# Patient Record
Sex: FEMALE | Race: WHITE | Employment: FULL TIME | URBAN - NONMETROPOLITAN AREA
[De-identification: names, ages, dates, MRNs, and addresses within clinical notes are randomized per-mention and may not be internally consistent; named-entity substitution may affect disease eponyms.]

---

## 2017-01-03 ENCOUNTER — OFFICE VISIT (OUTPATIENT)
Dept: FAMILY MEDICINE CLINIC | Age: 57
End: 2017-01-03

## 2017-01-03 VITALS
TEMPERATURE: 99.1 F | OXYGEN SATURATION: 98 % | WEIGHT: 201.8 LBS | HEIGHT: 64 IN | DIASTOLIC BLOOD PRESSURE: 80 MMHG | HEART RATE: 88 BPM | SYSTOLIC BLOOD PRESSURE: 110 MMHG | BODY MASS INDEX: 34.45 KG/M2

## 2017-01-03 DIAGNOSIS — J01.41 ACUTE RECURRENT PANSINUSITIS: Primary | ICD-10-CM

## 2017-01-03 PROCEDURE — 99213 OFFICE O/P EST LOW 20 MIN: CPT | Performed by: PHYSICIAN ASSISTANT

## 2017-01-03 RX ORDER — LACTOBACILLUS RHAMNOSUS GG 10B CELL
1 CAPSULE ORAL DAILY
Qty: 30 CAPSULE | Refills: 6 | Status: SHIPPED | OUTPATIENT
Start: 2017-01-03 | End: 2017-05-24 | Stop reason: ALTCHOICE

## 2017-01-03 RX ORDER — SULFAMETHOXAZOLE AND TRIMETHOPRIM 800; 160 MG/1; MG/1
1 TABLET ORAL 2 TIMES DAILY
Qty: 28 TABLET | Refills: 0 | Status: SHIPPED | OUTPATIENT
Start: 2017-01-03 | End: 2017-01-17

## 2017-01-03 RX ORDER — PREDNISONE 20 MG/1
20 TABLET ORAL 2 TIMES DAILY
Qty: 10 TABLET | Refills: 0 | Status: SHIPPED | OUTPATIENT
Start: 2017-01-03 | End: 2017-01-08

## 2017-01-03 ASSESSMENT — ENCOUNTER SYMPTOMS
SHORTNESS OF BREATH: 0
SINUS PRESSURE: 1
SORE THROAT: 0
HOARSE VOICE: 1
SINUS COMPLAINT: 1
COUGH: 0

## 2017-01-04 ENCOUNTER — TELEPHONE (OUTPATIENT)
Dept: FAMILY MEDICINE CLINIC | Age: 57
End: 2017-01-04

## 2017-01-09 ENCOUNTER — TELEPHONE (OUTPATIENT)
Dept: FAMILY MEDICINE CLINIC | Age: 57
End: 2017-01-09

## 2017-01-09 DIAGNOSIS — R05.9 COUGH: Primary | ICD-10-CM

## 2017-01-09 RX ORDER — DEXTROMETHORPHAN HYDROBROMIDE AND PROMETHAZINE HYDROCHLORIDE 15; 6.25 MG/5ML; MG/5ML
5 SYRUP ORAL 4 TIMES DAILY PRN
Qty: 150 ML | Refills: 0 | Status: SHIPPED | OUTPATIENT
Start: 2017-01-09 | End: 2017-01-16

## 2017-01-16 ENCOUNTER — TELEPHONE (OUTPATIENT)
Dept: FAMILY MEDICINE CLINIC | Age: 57
End: 2017-01-16

## 2017-03-31 ENCOUNTER — OFFICE VISIT (OUTPATIENT)
Dept: FAMILY MEDICINE CLINIC | Age: 57
End: 2017-03-31
Payer: COMMERCIAL

## 2017-03-31 VITALS
WEIGHT: 187 LBS | DIASTOLIC BLOOD PRESSURE: 78 MMHG | SYSTOLIC BLOOD PRESSURE: 108 MMHG | TEMPERATURE: 98 F | BODY MASS INDEX: 31.92 KG/M2 | HEIGHT: 64 IN | OXYGEN SATURATION: 98 % | HEART RATE: 84 BPM

## 2017-03-31 DIAGNOSIS — R53.83 FATIGUE, UNSPECIFIED TYPE: ICD-10-CM

## 2017-03-31 DIAGNOSIS — B07.0 PLANTAR WART: ICD-10-CM

## 2017-03-31 DIAGNOSIS — J01.41 ACUTE RECURRENT PANSINUSITIS: Primary | ICD-10-CM

## 2017-03-31 DIAGNOSIS — R40.0 DAYTIME SOMNOLENCE: ICD-10-CM

## 2017-03-31 PROCEDURE — 99214 OFFICE O/P EST MOD 30 MIN: CPT | Performed by: PHYSICIAN ASSISTANT

## 2017-03-31 PROCEDURE — 17110 DESTRUCTION B9 LES UP TO 14: CPT | Performed by: PHYSICIAN ASSISTANT

## 2017-03-31 RX ORDER — CHLORAL HYDRATE 500 MG
1000 CAPSULE ORAL DAILY
COMMUNITY
End: 2017-05-24 | Stop reason: ALTCHOICE

## 2017-03-31 RX ORDER — PREDNISONE 20 MG/1
20 TABLET ORAL 2 TIMES DAILY
Qty: 10 TABLET | Refills: 0 | Status: SHIPPED | OUTPATIENT
Start: 2017-03-31 | End: 2017-04-05

## 2017-03-31 RX ORDER — ASCORBIC ACID 500 MG
500 TABLET ORAL 2 TIMES DAILY
COMMUNITY
End: 2019-01-31

## 2017-03-31 RX ORDER — LANOLIN ALCOHOL/MO/W.PET/CERES
50 CREAM (GRAM) TOPICAL DAILY
COMMUNITY
End: 2019-01-31

## 2017-03-31 RX ORDER — MULTIVITAMIN WITH IRON
250 TABLET ORAL DAILY
COMMUNITY
End: 2017-05-24 | Stop reason: ALTCHOICE

## 2017-03-31 RX ORDER — SULFAMETHOXAZOLE AND TRIMETHOPRIM 800; 160 MG/1; MG/1
1 TABLET ORAL 2 TIMES DAILY
Qty: 28 TABLET | Refills: 0 | Status: SHIPPED | OUTPATIENT
Start: 2017-03-31 | End: 2017-04-14

## 2017-03-31 RX ORDER — FLUCONAZOLE 150 MG/1
TABLET ORAL
Qty: 2 TABLET | Refills: 1 | Status: SHIPPED | OUTPATIENT
Start: 2017-03-31 | End: 2017-05-24 | Stop reason: ALTCHOICE

## 2017-03-31 ASSESSMENT — ENCOUNTER SYMPTOMS
SWOLLEN GLANDS: 1
SORE THROAT: 0
SINUS COMPLAINT: 1
SINUS PRESSURE: 1
COUGH: 1

## 2017-04-02 ASSESSMENT — ENCOUNTER SYMPTOMS: GASTROINTESTINAL NEGATIVE: 1

## 2017-04-03 DIAGNOSIS — R40.0 DAYTIME SOMNOLENCE: Primary | ICD-10-CM

## 2017-04-27 ENCOUNTER — OFFICE VISIT (OUTPATIENT)
Dept: PRIMARY CARE CLINIC | Age: 57
End: 2017-04-27
Payer: COMMERCIAL

## 2017-04-27 VITALS
HEIGHT: 64 IN | TEMPERATURE: 98.2 F | BODY MASS INDEX: 31.1 KG/M2 | WEIGHT: 182.2 LBS | DIASTOLIC BLOOD PRESSURE: 74 MMHG | OXYGEN SATURATION: 98 % | SYSTOLIC BLOOD PRESSURE: 126 MMHG

## 2017-04-27 DIAGNOSIS — J01.41 ACUTE RECURRENT PANSINUSITIS: Primary | ICD-10-CM

## 2017-04-27 DIAGNOSIS — R50.81 FEVER IN OTHER DISEASES: ICD-10-CM

## 2017-04-27 LAB
INFLUENZA A ANTIBODY: NORMAL
INFLUENZA B ANTIBODY: NORMAL

## 2017-04-27 PROCEDURE — 87804 INFLUENZA ASSAY W/OPTIC: CPT | Performed by: PHYSICIAN ASSISTANT

## 2017-04-27 PROCEDURE — 99213 OFFICE O/P EST LOW 20 MIN: CPT | Performed by: PHYSICIAN ASSISTANT

## 2017-04-27 RX ORDER — FLUTICASONE PROPIONATE 50 MCG
2 SPRAY, SUSPENSION (ML) NASAL DAILY
Qty: 1 BOTTLE | Refills: 3 | Status: SHIPPED | OUTPATIENT
Start: 2017-04-27 | End: 2017-05-24 | Stop reason: ALTCHOICE

## 2017-04-27 RX ORDER — AMOXICILLIN AND CLAVULANATE POTASSIUM 875; 125 MG/1; MG/1
1 TABLET, FILM COATED ORAL 2 TIMES DAILY
Qty: 20 TABLET | Refills: 0 | Status: SHIPPED | OUTPATIENT
Start: 2017-04-27 | End: 2017-05-07

## 2017-04-27 ASSESSMENT — ENCOUNTER SYMPTOMS
SORE THROAT: 1
WHEEZING: 0
COUGH: 1
SHORTNESS OF BREATH: 0

## 2017-05-01 ENCOUNTER — TELEPHONE (OUTPATIENT)
Dept: FAMILY MEDICINE CLINIC | Age: 57
End: 2017-05-01

## 2017-05-03 ENCOUNTER — TELEPHONE (OUTPATIENT)
Dept: FAMILY MEDICINE CLINIC | Age: 57
End: 2017-05-03

## 2017-05-03 RX ORDER — FLUCONAZOLE 150 MG/1
TABLET ORAL
Qty: 2 TABLET | Refills: 1 | Status: SHIPPED | OUTPATIENT
Start: 2017-05-03 | End: 2017-05-24 | Stop reason: ALTCHOICE

## 2017-05-24 ENCOUNTER — OFFICE VISIT (OUTPATIENT)
Dept: FAMILY MEDICINE CLINIC | Age: 57
End: 2017-05-24
Payer: COMMERCIAL

## 2017-05-24 VITALS
TEMPERATURE: 98.4 F | WEIGHT: 181 LBS | HEART RATE: 91 BPM | OXYGEN SATURATION: 97 % | DIASTOLIC BLOOD PRESSURE: 62 MMHG | RESPIRATION RATE: 12 BRPM | HEIGHT: 64 IN | SYSTOLIC BLOOD PRESSURE: 106 MMHG | BODY MASS INDEX: 30.9 KG/M2

## 2017-05-24 DIAGNOSIS — M21.41 FLAT FEET, BILATERAL: ICD-10-CM

## 2017-05-24 DIAGNOSIS — B07.0 PLANTAR WART: Primary | ICD-10-CM

## 2017-05-24 DIAGNOSIS — M21.42 FLAT FEET, BILATERAL: ICD-10-CM

## 2017-05-24 PROCEDURE — 99213 OFFICE O/P EST LOW 20 MIN: CPT | Performed by: NURSE PRACTITIONER

## 2017-05-24 RX ORDER — FORMALDEHYDE 100 MG/ML
1 SOLUTION TOPICAL DAILY PRN
Qty: 120 ML | Refills: 0 | Status: SHIPPED | OUTPATIENT
Start: 2017-05-24 | End: 2019-06-03 | Stop reason: ALTCHOICE

## 2017-05-24 ASSESSMENT — ENCOUNTER SYMPTOMS
CONSTIPATION: 0
VOMITING: 0
TROUBLE SWALLOWING: 0
ALLERGIC/IMMUNOLOGIC NEGATIVE: 1
RESPIRATORY NEGATIVE: 1
SHORTNESS OF BREATH: 0
NAUSEA: 0
DIARRHEA: 0
COUGH: 0
SINUS PRESSURE: 0
ABDOMINAL PAIN: 0
CHEST TIGHTNESS: 0
GASTROINTESTINAL NEGATIVE: 1
EYES NEGATIVE: 1

## 2017-05-26 ENCOUNTER — TELEPHONE (OUTPATIENT)
Dept: PODIATRY | Age: 57
End: 2017-05-26

## 2017-05-26 ENCOUNTER — OFFICE VISIT (OUTPATIENT)
Dept: PODIATRY | Age: 57
End: 2017-05-26
Payer: COMMERCIAL

## 2017-05-26 VITALS
DIASTOLIC BLOOD PRESSURE: 74 MMHG | BODY MASS INDEX: 30.83 KG/M2 | WEIGHT: 180.6 LBS | HEIGHT: 64 IN | SYSTOLIC BLOOD PRESSURE: 115 MMHG | HEART RATE: 78 BPM

## 2017-05-26 DIAGNOSIS — M79.672 LEFT FOOT PAIN: ICD-10-CM

## 2017-05-26 DIAGNOSIS — M21.6X1 PRONATION DEFORMITY OF BOTH FEET: Primary | ICD-10-CM

## 2017-05-26 DIAGNOSIS — M21.6X2 PRONATION DEFORMITY OF BOTH FEET: Primary | ICD-10-CM

## 2017-05-26 DIAGNOSIS — L84 CORNS AND CALLOSITIES: ICD-10-CM

## 2017-05-26 PROCEDURE — L3040 FT ARCH SUPRT PREMOLD LONGIT: HCPCS | Performed by: PODIATRIST

## 2017-05-26 PROCEDURE — 99213 OFFICE O/P EST LOW 20 MIN: CPT | Performed by: PODIATRIST

## 2017-05-26 RX ORDER — UREA 40 G/100G
1 LOTION TOPICAL DAILY
Qty: 1 BOTTLE | Refills: 1 | Status: SHIPPED | OUTPATIENT
Start: 2017-05-26 | End: 2019-01-31

## 2017-07-14 ENCOUNTER — TELEPHONE (OUTPATIENT)
Dept: FAMILY MEDICINE CLINIC | Age: 57
End: 2017-07-14

## 2017-07-30 ENCOUNTER — OFFICE VISIT (OUTPATIENT)
Dept: PRIMARY CARE CLINIC | Age: 57
End: 2017-07-30
Payer: COMMERCIAL

## 2017-07-30 VITALS
BODY MASS INDEX: 31.58 KG/M2 | SYSTOLIC BLOOD PRESSURE: 114 MMHG | HEART RATE: 110 BPM | OXYGEN SATURATION: 96 % | DIASTOLIC BLOOD PRESSURE: 70 MMHG | WEIGHT: 185 LBS | RESPIRATION RATE: 14 BRPM | HEIGHT: 64 IN | TEMPERATURE: 98.1 F

## 2017-07-30 DIAGNOSIS — R35.0 FREQUENCY OF URINATION: Primary | ICD-10-CM

## 2017-07-30 DIAGNOSIS — H60.392 OTHER INFECTIVE OTITIS EXTERNA OF LEFT EAR, UNSPECIFIED CHRONICITY: ICD-10-CM

## 2017-07-30 LAB
-: ABNORMAL
AMORPHOUS: ABNORMAL
BACTERIA: ABNORMAL
BILIRUBIN URINE: NEGATIVE
CASTS UA: ABNORMAL /LPF (ref 0–2)
COLOR: ABNORMAL
COMMENT UA: ABNORMAL
CRYSTALS, UA: ABNORMAL /HPF
EPITHELIAL CELLS UA: ABNORMAL /HPF (ref 0–5)
GLUCOSE URINE: NEGATIVE
KETONES, URINE: NEGATIVE
LEUKOCYTE ESTERASE, URINE: NEGATIVE
MUCUS: ABNORMAL
NITRITE, URINE: NEGATIVE
OTHER OBSERVATIONS UA: ABNORMAL
PH UA: 5.5 (ref 5–6)
PROTEIN UA: NEGATIVE
RBC UA: ABNORMAL /HPF (ref 0–4)
RENAL EPITHELIAL, UA: ABNORMAL /HPF
SPECIFIC GRAVITY UA: 1.02 (ref 1.01–1.02)
TRICHOMONAS: ABNORMAL
TURBIDITY: ABNORMAL
URINE HGB: ABNORMAL
UROBILINOGEN, URINE: NORMAL
WBC UA: ABNORMAL /HPF (ref 0–4)
YEAST: ABNORMAL

## 2017-07-30 PROCEDURE — 81003 URINALYSIS AUTO W/O SCOPE: CPT | Performed by: FAMILY MEDICINE

## 2017-07-30 PROCEDURE — 99213 OFFICE O/P EST LOW 20 MIN: CPT | Performed by: FAMILY MEDICINE

## 2017-07-30 PROCEDURE — 81001 URINALYSIS AUTO W/SCOPE: CPT | Performed by: FAMILY MEDICINE

## 2017-07-30 ASSESSMENT — ENCOUNTER SYMPTOMS
SINUS PRESSURE: 0
EYES NEGATIVE: 1
GASTROINTESTINAL NEGATIVE: 1
RHINORRHEA: 0
RESPIRATORY NEGATIVE: 1
ALLERGIC/IMMUNOLOGIC NEGATIVE: 1

## 2017-08-08 DIAGNOSIS — K21.9 GASTROESOPHAGEAL REFLUX DISEASE WITHOUT ESOPHAGITIS: ICD-10-CM

## 2017-08-09 RX ORDER — PANTOPRAZOLE SODIUM 40 MG/1
TABLET, DELAYED RELEASE ORAL
Qty: 90 TABLET | Refills: 0 | Status: SHIPPED | OUTPATIENT
Start: 2017-08-09 | End: 2017-09-25 | Stop reason: SDUPTHER

## 2017-09-23 ENCOUNTER — HOSPITAL ENCOUNTER (OUTPATIENT)
Dept: LAB | Age: 57
Setting detail: SPECIMEN
Discharge: HOME | End: 2017-09-23
Payer: COMMERCIAL

## 2017-09-23 DIAGNOSIS — I63.9 CEREBROVASCULAR ACCIDENT (CVA), UNSPECIFIED MECHANISM (HCC): ICD-10-CM

## 2017-09-23 DIAGNOSIS — Z11.59 NEED FOR HEPATITIS C SCREENING TEST: ICD-10-CM

## 2017-09-23 DIAGNOSIS — Z11.4 SCREENING FOR HIV (HUMAN IMMUNODEFICIENCY VIRUS): ICD-10-CM

## 2017-09-23 LAB
ALBUMIN SERPL-MCNC: 4.2 G/DL (ref 3.5–5.2)
ALBUMIN/GLOBULIN RATIO: 1.8 (ref 1–2.5)
ALP BLD-CCNC: 53 U/L (ref 35–104)
ALT SERPL-CCNC: 13 U/L (ref 5–33)
ANION GAP SERPL CALCULATED.3IONS-SCNC: 11 MMOL/L (ref 9–17)
AST SERPL-CCNC: 13 U/L
BILIRUB SERPL-MCNC: 0.39 MG/DL (ref 0.3–1.2)
BUN BLDV-MCNC: 18 MG/DL (ref 6–20)
BUN/CREAT BLD: 31 (ref 9–20)
CALCIUM SERPL-MCNC: 9.2 MG/DL (ref 8.6–10.4)
CHLORIDE BLD-SCNC: 106 MMOL/L (ref 98–107)
CHOLESTEROL/HDL RATIO: 3
CHOLESTEROL: 167 MG/DL
CO2: 25 MMOL/L (ref 20–31)
CREAT SERPL-MCNC: 0.59 MG/DL (ref 0.5–0.9)
GFR AFRICAN AMERICAN: >60 ML/MIN
GFR NON-AFRICAN AMERICAN: >60 ML/MIN
GFR SERPL CREATININE-BSD FRML MDRD: ABNORMAL ML/MIN/{1.73_M2}
GFR SERPL CREATININE-BSD FRML MDRD: ABNORMAL ML/MIN/{1.73_M2}
GLUCOSE BLD-MCNC: 90 MG/DL (ref 70–99)
HDLC SERPL-MCNC: 56 MG/DL
HEPATITIS C ANTIBODY: NONREACTIVE
HIV AG/AB: NONREACTIVE
LDL CHOLESTEROL: 95 MG/DL (ref 0–130)
POTASSIUM SERPL-SCNC: 4.1 MMOL/L (ref 3.7–5.3)
SODIUM BLD-SCNC: 142 MMOL/L (ref 135–144)
TOTAL PROTEIN: 6.5 G/DL (ref 6.4–8.3)
TRIGL SERPL-MCNC: 81 MG/DL
VLDLC SERPL CALC-MCNC: NORMAL MG/DL (ref 1–30)

## 2017-09-23 PROCEDURE — 87389 HIV-1 AG W/HIV-1&-2 AB AG IA: CPT

## 2017-09-23 PROCEDURE — 80061 LIPID PANEL: CPT

## 2017-09-23 PROCEDURE — 36415 COLL VENOUS BLD VENIPUNCTURE: CPT

## 2017-09-23 PROCEDURE — 86803 HEPATITIS C AB TEST: CPT

## 2017-09-23 PROCEDURE — 80053 COMPREHEN METABOLIC PANEL: CPT

## 2017-09-25 ENCOUNTER — OFFICE VISIT (OUTPATIENT)
Dept: FAMILY MEDICINE CLINIC | Age: 57
End: 2017-09-25
Payer: COMMERCIAL

## 2017-09-25 ENCOUNTER — TELEPHONE (OUTPATIENT)
Dept: FAMILY MEDICINE CLINIC | Age: 57
End: 2017-09-25

## 2017-09-25 VITALS
DIASTOLIC BLOOD PRESSURE: 68 MMHG | WEIGHT: 181 LBS | BODY MASS INDEX: 32.07 KG/M2 | SYSTOLIC BLOOD PRESSURE: 110 MMHG | HEART RATE: 88 BPM | HEIGHT: 63 IN

## 2017-09-25 DIAGNOSIS — Z72.0 TOBACCO ABUSE: ICD-10-CM

## 2017-09-25 DIAGNOSIS — F41.9 ANXIETY: ICD-10-CM

## 2017-09-25 DIAGNOSIS — I63.9 CEREBROVASCULAR ACCIDENT (CVA), UNSPECIFIED MECHANISM (HCC): ICD-10-CM

## 2017-09-25 DIAGNOSIS — K21.9 GASTROESOPHAGEAL REFLUX DISEASE WITHOUT ESOPHAGITIS: Primary | ICD-10-CM

## 2017-09-25 PROCEDURE — 99214 OFFICE O/P EST MOD 30 MIN: CPT | Performed by: PHYSICIAN ASSISTANT

## 2017-09-25 RX ORDER — CLOPIDOGREL BISULFATE 75 MG/1
75 TABLET ORAL DAILY
Qty: 90 TABLET | Refills: 1 | Status: SHIPPED | OUTPATIENT
Start: 2017-09-25 | End: 2018-06-08 | Stop reason: SDUPTHER

## 2017-09-25 RX ORDER — BUPROPION HYDROCHLORIDE 100 MG/1
100 TABLET, EXTENDED RELEASE ORAL 2 TIMES DAILY
Qty: 180 TABLET | Refills: 1 | Status: SHIPPED | OUTPATIENT
Start: 2017-09-25 | End: 2019-01-11

## 2017-09-25 RX ORDER — VARENICLINE TARTRATE 1 MG/1
1 TABLET, FILM COATED ORAL 2 TIMES DAILY
Qty: 180 TABLET | Refills: 0 | Status: SHIPPED | OUTPATIENT
Start: 2017-09-25 | End: 2018-07-31

## 2017-09-25 RX ORDER — PANTOPRAZOLE SODIUM 40 MG/1
TABLET, DELAYED RELEASE ORAL
Qty: 90 TABLET | Refills: 1 | Status: SHIPPED | OUTPATIENT
Start: 2017-09-25 | End: 2018-06-08 | Stop reason: SDUPTHER

## 2017-09-25 RX ORDER — VARENICLINE TARTRATE 25 MG
KIT ORAL
Qty: 1 EACH | Refills: 0 | Status: SHIPPED | OUTPATIENT
Start: 2017-09-25 | End: 2018-07-31

## 2017-09-30 ASSESSMENT — ENCOUNTER SYMPTOMS
ABDOMINAL PAIN: 0
COUGH: 0
HEARTBURN: 1
CHOKING: 0

## 2017-12-01 DIAGNOSIS — Z72.0 TOBACCO ABUSE: ICD-10-CM

## 2017-12-04 RX ORDER — VARENICLINE TARTRATE 1 MG/1
TABLET, FILM COATED ORAL
Qty: 168 TABLET | Refills: 0 | OUTPATIENT
Start: 2017-12-04

## 2018-01-31 ENCOUNTER — TELEPHONE (OUTPATIENT)
Dept: MAMMOGRAPHY | Age: 58
End: 2018-01-31

## 2018-01-31 DIAGNOSIS — Z12.31 SCREENING MAMMOGRAM, ENCOUNTER FOR: Primary | ICD-10-CM

## 2018-02-03 ENCOUNTER — HOSPITAL ENCOUNTER (OUTPATIENT)
Dept: MAMMOGRAPHY | Age: 58
Discharge: HOME | End: 2018-02-05
Payer: COMMERCIAL

## 2018-02-03 DIAGNOSIS — Z12.31 SCREENING MAMMOGRAM, ENCOUNTER FOR: ICD-10-CM

## 2018-02-03 PROCEDURE — 77067 SCR MAMMO BI INCL CAD: CPT

## 2018-04-24 ENCOUNTER — OFFICE VISIT (OUTPATIENT)
Dept: PRIMARY CARE CLINIC | Age: 58
End: 2018-04-24
Payer: COMMERCIAL

## 2018-04-24 VITALS
DIASTOLIC BLOOD PRESSURE: 64 MMHG | HEART RATE: 74 BPM | BODY MASS INDEX: 32.44 KG/M2 | OXYGEN SATURATION: 98 % | WEIGHT: 190 LBS | TEMPERATURE: 98.2 F | HEIGHT: 64 IN | SYSTOLIC BLOOD PRESSURE: 120 MMHG

## 2018-04-24 DIAGNOSIS — H66.002 ACUTE SUPPURATIVE OTITIS MEDIA OF LEFT EAR WITHOUT SPONTANEOUS RUPTURE OF TYMPANIC MEMBRANE, RECURRENCE NOT SPECIFIED: Primary | ICD-10-CM

## 2018-04-24 DIAGNOSIS — H60.331 ACUTE SWIMMER'S EAR OF RIGHT SIDE: ICD-10-CM

## 2018-04-24 PROCEDURE — 99214 OFFICE O/P EST MOD 30 MIN: CPT | Performed by: FAMILY MEDICINE

## 2018-04-24 RX ORDER — AMOXICILLIN AND CLAVULANATE POTASSIUM 500; 125 MG/1; MG/1
1 TABLET, FILM COATED ORAL 2 TIMES DAILY
Qty: 20 TABLET | Refills: 0 | Status: SHIPPED | OUTPATIENT
Start: 2018-04-24 | End: 2018-05-04

## 2018-04-24 ASSESSMENT — ENCOUNTER SYMPTOMS
RHINORRHEA: 1
SINUS PRESSURE: 0
SINUS PAIN: 0
GASTROINTESTINAL NEGATIVE: 1
EYES NEGATIVE: 1
SORE THROAT: 0
RESPIRATORY NEGATIVE: 1

## 2018-05-03 ENCOUNTER — TELEPHONE (OUTPATIENT)
Dept: FAMILY MEDICINE CLINIC | Age: 58
End: 2018-05-03

## 2018-05-03 RX ORDER — FLUCONAZOLE 150 MG/1
TABLET ORAL
Qty: 2 TABLET | Refills: 0 | Status: SHIPPED | OUTPATIENT
Start: 2018-05-03 | End: 2018-07-31

## 2018-06-06 DIAGNOSIS — K21.9 GASTROESOPHAGEAL REFLUX DISEASE WITHOUT ESOPHAGITIS: ICD-10-CM

## 2018-06-06 DIAGNOSIS — I63.9 CEREBROVASCULAR ACCIDENT (CVA), UNSPECIFIED MECHANISM (HCC): ICD-10-CM

## 2018-06-06 RX ORDER — PANTOPRAZOLE SODIUM 40 MG/1
TABLET, DELAYED RELEASE ORAL
Qty: 90 TABLET | Refills: 1 | OUTPATIENT
Start: 2018-06-06

## 2018-06-06 RX ORDER — CLOPIDOGREL BISULFATE 75 MG/1
TABLET ORAL
Qty: 90 TABLET | Refills: 1 | OUTPATIENT
Start: 2018-06-06

## 2018-06-08 DIAGNOSIS — K21.9 GASTROESOPHAGEAL REFLUX DISEASE WITHOUT ESOPHAGITIS: ICD-10-CM

## 2018-06-08 DIAGNOSIS — I63.9 CEREBROVASCULAR ACCIDENT (CVA), UNSPECIFIED MECHANISM (HCC): ICD-10-CM

## 2018-06-08 RX ORDER — CLOPIDOGREL BISULFATE 75 MG/1
75 TABLET ORAL DAILY
Qty: 90 TABLET | Refills: 0 | Status: SHIPPED | OUTPATIENT
Start: 2018-06-08 | End: 2018-08-28 | Stop reason: SDUPTHER

## 2018-06-08 RX ORDER — PANTOPRAZOLE SODIUM 40 MG/1
TABLET, DELAYED RELEASE ORAL
Qty: 90 TABLET | Refills: 0 | Status: SHIPPED | OUTPATIENT
Start: 2018-06-08 | End: 2018-08-28 | Stop reason: SDUPTHER

## 2018-07-12 ENCOUNTER — OFFICE VISIT (OUTPATIENT)
Dept: FAMILY MEDICINE CLINIC | Age: 58
End: 2018-07-12
Payer: COMMERCIAL

## 2018-07-12 ENCOUNTER — NURSE ONLY (OUTPATIENT)
Dept: LAB | Age: 58
End: 2018-07-12
Payer: COMMERCIAL

## 2018-07-12 VITALS
HEIGHT: 64 IN | BODY MASS INDEX: 29.64 KG/M2 | SYSTOLIC BLOOD PRESSURE: 118 MMHG | DIASTOLIC BLOOD PRESSURE: 70 MMHG | WEIGHT: 173.6 LBS | HEART RATE: 72 BPM

## 2018-07-12 DIAGNOSIS — Z23 NEED FOR 23-POLYVALENT PNEUMOCOCCAL POLYSACCHARIDE VACCINE: ICD-10-CM

## 2018-07-12 DIAGNOSIS — Z20.5 EXPOSURE TO HEPATITIS A: ICD-10-CM

## 2018-07-12 DIAGNOSIS — I63.9 CEREBROVASCULAR ACCIDENT (CVA), UNSPECIFIED MECHANISM (HCC): ICD-10-CM

## 2018-07-12 DIAGNOSIS — K21.9 GASTROESOPHAGEAL REFLUX DISEASE WITHOUT ESOPHAGITIS: Primary | ICD-10-CM

## 2018-07-12 DIAGNOSIS — J30.1 CHRONIC ALLERGIC RHINITIS DUE TO POLLEN, UNSPECIFIED SEASONALITY: ICD-10-CM

## 2018-07-12 DIAGNOSIS — Z23 NEED FOR SHINGLES VACCINE: ICD-10-CM

## 2018-07-12 PROCEDURE — 99213 OFFICE O/P EST LOW 20 MIN: CPT | Performed by: PHYSICIAN ASSISTANT

## 2018-07-12 PROCEDURE — 90471 IMMUNIZATION ADMIN: CPT | Performed by: PHYSICIAN ASSISTANT

## 2018-07-12 PROCEDURE — 90632 HEPA VACCINE ADULT IM: CPT | Performed by: PHYSICIAN ASSISTANT

## 2018-07-12 RX ORDER — CETIRIZINE HYDROCHLORIDE 10 MG/1
10 TABLET ORAL DAILY
Qty: 30 TABLET | Refills: 6 | Status: SHIPPED | OUTPATIENT
Start: 2018-07-12 | End: 2019-01-11 | Stop reason: SDUPTHER

## 2018-07-12 RX ORDER — PANTOPRAZOLE SODIUM 40 MG/1
TABLET, DELAYED RELEASE ORAL
Qty: 90 TABLET | Refills: 3 | Status: CANCELLED | OUTPATIENT
Start: 2018-07-12

## 2018-07-12 RX ORDER — CLOPIDOGREL BISULFATE 75 MG/1
75 TABLET ORAL DAILY
Qty: 90 TABLET | Refills: 3 | Status: CANCELLED | OUTPATIENT
Start: 2018-07-12

## 2018-07-12 ASSESSMENT — PATIENT HEALTH QUESTIONNAIRE - PHQ9
SUM OF ALL RESPONSES TO PHQ9 QUESTIONS 1 & 2: 0
SUM OF ALL RESPONSES TO PHQ QUESTIONS 1-9: 0
2. FEELING DOWN, DEPRESSED OR HOPELESS: 0
1. LITTLE INTEREST OR PLEASURE IN DOING THINGS: 0

## 2018-07-12 ASSESSMENT — ENCOUNTER SYMPTOMS
HEARTBURN: 0
ABDOMINAL PAIN: 0
RESPIRATORY NEGATIVE: 1
BELCHING: 0

## 2018-07-16 ENCOUNTER — TELEPHONE (OUTPATIENT)
Dept: FAMILY MEDICINE CLINIC | Age: 58
End: 2018-07-16

## 2018-07-31 ENCOUNTER — HOSPITAL ENCOUNTER (OUTPATIENT)
Age: 58
Setting detail: SPECIMEN
Discharge: HOME | End: 2018-07-31
Payer: COMMERCIAL

## 2018-07-31 ENCOUNTER — OFFICE VISIT (OUTPATIENT)
Dept: PRIMARY CARE CLINIC | Age: 58
End: 2018-07-31
Payer: COMMERCIAL

## 2018-07-31 VITALS
BODY MASS INDEX: 30.1 KG/M2 | HEART RATE: 74 BPM | SYSTOLIC BLOOD PRESSURE: 120 MMHG | WEIGHT: 172.6 LBS | TEMPERATURE: 98 F | DIASTOLIC BLOOD PRESSURE: 72 MMHG | OXYGEN SATURATION: 99 %

## 2018-07-31 DIAGNOSIS — R31.9 HEMATURIA, UNSPECIFIED TYPE: ICD-10-CM

## 2018-07-31 DIAGNOSIS — J40 BRONCHITIS: Primary | ICD-10-CM

## 2018-07-31 DIAGNOSIS — Z87.42 HISTORY OF VAGINITIS: ICD-10-CM

## 2018-07-31 LAB
-: NORMAL
AMORPHOUS: NORMAL
BACTERIA: NORMAL
BILIRUBIN URINE: NEGATIVE
CASTS UA: NORMAL /LPF (ref 0–2)
COLOR: ABNORMAL
COMMENT UA: ABNORMAL
CRYSTALS, UA: NORMAL /HPF
EPITHELIAL CELLS UA: NORMAL /HPF (ref 0–5)
GLUCOSE URINE: NEGATIVE
KETONES, URINE: NEGATIVE
LEUKOCYTE ESTERASE, URINE: NEGATIVE
MUCUS: NORMAL
NITRITE, URINE: NEGATIVE
OTHER OBSERVATIONS UA: NORMAL
PH UA: 6 (ref 5–6)
PROTEIN UA: NEGATIVE
RBC UA: NORMAL /HPF (ref 0–4)
RENAL EPITHELIAL, UA: NORMAL /HPF
SPECIFIC GRAVITY UA: 1 (ref 1.01–1.02)
TRICHOMONAS: NORMAL
TURBIDITY: ABNORMAL
URINE HGB: NEGATIVE
UROBILINOGEN, URINE: NORMAL
WBC UA: NORMAL /HPF (ref 0–4)
YEAST: NORMAL

## 2018-07-31 PROCEDURE — 81001 URINALYSIS AUTO W/SCOPE: CPT

## 2018-07-31 PROCEDURE — 99214 OFFICE O/P EST MOD 30 MIN: CPT | Performed by: FAMILY MEDICINE

## 2018-07-31 RX ORDER — FLUCONAZOLE 150 MG/1
TABLET ORAL
Qty: 2 TABLET | Refills: 0 | Status: SHIPPED | OUTPATIENT
Start: 2018-07-31 | End: 2018-08-03

## 2018-07-31 RX ORDER — AZITHROMYCIN 250 MG/1
TABLET, FILM COATED ORAL
Qty: 1 PACKET | Refills: 0 | Status: SHIPPED | OUTPATIENT
Start: 2018-07-31 | End: 2018-08-04

## 2018-07-31 RX ORDER — BENZONATATE 200 MG/1
200 CAPSULE ORAL 3 TIMES DAILY PRN
Qty: 30 CAPSULE | Refills: 0 | Status: SHIPPED | OUTPATIENT
Start: 2018-07-31 | End: 2019-01-31

## 2018-08-28 DIAGNOSIS — K21.9 GASTROESOPHAGEAL REFLUX DISEASE WITHOUT ESOPHAGITIS: ICD-10-CM

## 2018-08-28 DIAGNOSIS — I63.9 CEREBROVASCULAR ACCIDENT (CVA), UNSPECIFIED MECHANISM (HCC): ICD-10-CM

## 2018-08-28 RX ORDER — CLOPIDOGREL BISULFATE 75 MG/1
TABLET ORAL
Qty: 90 TABLET | Refills: 1 | Status: SHIPPED | OUTPATIENT
Start: 2018-08-28 | End: 2019-01-11 | Stop reason: SDUPTHER

## 2018-08-28 RX ORDER — PANTOPRAZOLE SODIUM 40 MG/1
TABLET, DELAYED RELEASE ORAL
Qty: 90 TABLET | Refills: 1 | Status: SHIPPED | OUTPATIENT
Start: 2018-08-28 | End: 2019-01-11 | Stop reason: SDUPTHER

## 2018-09-26 ENCOUNTER — TELEPHONE (OUTPATIENT)
Dept: PRIMARY CARE CLINIC | Age: 58
End: 2018-09-26

## 2018-11-07 ENCOUNTER — TELEPHONE (OUTPATIENT)
Dept: PRIMARY CARE CLINIC | Age: 58
End: 2018-11-07

## 2018-12-05 ENCOUNTER — TELEPHONE (OUTPATIENT)
Dept: FAMILY MEDICINE CLINIC | Age: 58
End: 2018-12-05

## 2018-12-05 ENCOUNTER — HOSPITAL ENCOUNTER (OUTPATIENT)
Age: 58
Setting detail: SPECIMEN
Discharge: HOME | End: 2018-12-05
Payer: COMMERCIAL

## 2018-12-05 ENCOUNTER — OFFICE VISIT (OUTPATIENT)
Dept: FAMILY MEDICINE CLINIC | Age: 58
End: 2018-12-05
Payer: COMMERCIAL

## 2018-12-05 VITALS
TEMPERATURE: 97.5 F | OXYGEN SATURATION: 99 % | WEIGHT: 156 LBS | SYSTOLIC BLOOD PRESSURE: 110 MMHG | BODY MASS INDEX: 26.63 KG/M2 | HEIGHT: 64 IN | HEART RATE: 69 BPM | DIASTOLIC BLOOD PRESSURE: 70 MMHG

## 2018-12-05 DIAGNOSIS — R10.9 ACUTE LEFT FLANK PAIN: Primary | ICD-10-CM

## 2018-12-05 DIAGNOSIS — R10.9 ACUTE LEFT FLANK PAIN: ICD-10-CM

## 2018-12-05 LAB
-: NORMAL
AMORPHOUS: NORMAL
BACTERIA: NORMAL
BILIRUBIN URINE: NEGATIVE
CASTS UA: NORMAL /LPF (ref 0–2)
COLOR: ABNORMAL
COMMENT UA: ABNORMAL
CRYSTALS, UA: NORMAL /HPF
EPITHELIAL CELLS UA: NORMAL /HPF (ref 0–5)
GLUCOSE URINE: NEGATIVE
KETONES, URINE: NEGATIVE
LEUKOCYTE ESTERASE, URINE: NEGATIVE
MUCUS: NORMAL
NITRITE, URINE: NEGATIVE
OTHER OBSERVATIONS UA: NORMAL
PH UA: 7 (ref 5–6)
PROTEIN UA: NEGATIVE
RBC UA: NORMAL /HPF (ref 0–4)
RENAL EPITHELIAL, UA: NORMAL /HPF
SPECIFIC GRAVITY UA: 1 (ref 1.01–1.02)
TRICHOMONAS: NORMAL
TURBIDITY: ABNORMAL
URINE HGB: NEGATIVE
UROBILINOGEN, URINE: NORMAL
WBC UA: NORMAL /HPF (ref 0–4)
YEAST: NORMAL

## 2018-12-05 PROCEDURE — 99213 OFFICE O/P EST LOW 20 MIN: CPT | Performed by: NURSE PRACTITIONER

## 2018-12-05 PROCEDURE — 81001 URINALYSIS AUTO W/SCOPE: CPT

## 2018-12-05 ASSESSMENT — ENCOUNTER SYMPTOMS
RESPIRATORY NEGATIVE: 1
CONSTIPATION: 0
SINUS PRESSURE: 0
ABDOMINAL PAIN: 1
COLOR CHANGE: 0
VOMITING: 0
NAUSEA: 0
COUGH: 0
SHORTNESS OF BREATH: 0
CHEST TIGHTNESS: 0
ALLERGIC/IMMUNOLOGIC NEGATIVE: 1
DIARRHEA: 0
EYES NEGATIVE: 1
TROUBLE SWALLOWING: 0

## 2018-12-05 NOTE — PROGRESS NOTES
[facility]    Subjective:     Patient ID: Karen Turner is a 66 y.o. y.o. female. HPI Patient in for office for Left lower back pain that radiates to her ABD times 2 weeks. She denies injury. She has tried Tylenol and IBU without effect. Symptoms are getting worse. She has been eating a low carb diet and wonder if this is causing it.      Past Medical History:   Diagnosis Date    Anxiety     Cerebrovascular disease     Mini stroke in 2006    CVA (cerebral infarction)     Facial weakness     left    Family history of colon cancer     Gastroesophageal reflux disease without esophagitis 5/31/2016    History of TIAs     Hypoglycemia     Left hemiparesis (HCC)     Memory problem     Migraine     Sleep difficulties     Speech abnormality     Tobacco abuse     Unspecified sleep apnea        Past Surgical History:   Procedure Laterality Date    APPENDECTOMY      CARDIAC CATHETERIZATION  7-2-15    nml    CHOLECYSTECTOMY      COLONOSCOPY  4/29/2015    2 polyps, sigmoid diverticulosis    FOOT SURGERY Right     HYSTERECTOMY      OVARIAN CYST SURGERY      TONSILLECTOMY         Family History   Problem Relation Age of Onset    Diabetes Mother     Heart Disease Father         afib    High Blood Pressure Father     Diabetes Maternal Grandmother     Cancer Maternal Grandmother         breast    Cancer Maternal Grandfather         brain    Heart Disease Paternal Grandfather     Diabetes Sister     Thyroid Disease Sister     Mental Retardation Sister     Cancer Brother         colon, prostate, lung    Diabetes Brother     Diabetes Brother     Other Brother         hyperglycemia    Heart Disease Brother         atrial fibrillation    Other Other         Jack Syndrome/Jack Syndrome    Other Grandchild         2nd Grandson with Hunters Syndrome       Allergies   Allergen Reactions    Tape [Adhesive Tape] Rash       Current Outpatient Prescriptions   Medication Sig (left). Negative for difficulty urinating, dysuria and urgency. She states that her  say her urine has a strong odor     Musculoskeletal: Negative for myalgias. Skin: Negative. Negative for color change. Allergic/Immunologic: Negative. Negative for environmental allergies and food allergies. Neurological: Negative for dizziness, light-headedness and headaches. Hematological: Negative. Psychiatric/Behavioral: Negative. Objective:      /70   Pulse 69   Temp 97.5 °F (36.4 °C)   Ht 5' 4\" (1.626 m)   Wt 156 lb (70.8 kg)   SpO2 99%   BMI 26.78 kg/m²     Physical Exam   Constitutional: She is oriented to person, place, and time. She appears well-developed and well-nourished. No distress. HENT:   Head: Normocephalic and atraumatic. Right Ear: Hearing, tympanic membrane and external ear normal.   Left Ear: Hearing, tympanic membrane and external ear normal.   Nose: Nose normal.   Mouth/Throat: Uvula is midline and mucous membranes are normal.   Eyes: Pupils are equal, round, and reactive to light. Conjunctivae and EOM are normal.   Neck: Normal range of motion. Cardiovascular: Normal rate, regular rhythm and normal heart sounds. Pulmonary/Chest: Effort normal and breath sounds normal.   Abdominal: Soft. Bowel sounds are normal.   Musculoskeletal: Normal range of motion. Arms:  Area of pain and discomfort. Tenderness with gentle palpation. Neurological: She is alert and oriented to person, place, and time. Skin: Skin is warm and dry. She is not diaphoretic. Psychiatric: She has a normal mood and affect. Her behavior is normal. Judgment and thought content normal.   Nursing note and vitals reviewed. Assessment & Plan:      1. Acute left flank pain  Will call with results and treat if needed. Patient does not want to wait for results. - Urinalysis Reflex to Culture; Future    Answered all of the patient's questions. Agrees with plan of care.  Follow up PRN.       Bhavana Patton, APRN - CNP  12/5/2018 9:53 AM

## 2018-12-06 ENCOUNTER — TELEPHONE (OUTPATIENT)
Dept: FAMILY MEDICINE CLINIC | Age: 58
End: 2018-12-06

## 2018-12-06 ENCOUNTER — HOSPITAL ENCOUNTER (OUTPATIENT)
Dept: CT IMAGING | Age: 58
Discharge: HOME | End: 2018-12-08
Payer: COMMERCIAL

## 2018-12-06 ENCOUNTER — HOSPITAL ENCOUNTER (OUTPATIENT)
Dept: LAB | Age: 58
Discharge: HOME | End: 2018-12-06
Payer: COMMERCIAL

## 2018-12-06 DIAGNOSIS — R10.9 ACUTE LEFT FLANK PAIN: ICD-10-CM

## 2018-12-06 LAB
ABSOLUTE EOS #: 0.1 K/UL (ref 0–0.4)
ABSOLUTE IMMATURE GRANULOCYTE: NORMAL K/UL (ref 0–0.3)
ABSOLUTE LYMPH #: 2.3 K/UL (ref 1–4.8)
ABSOLUTE MONO #: 0.4 K/UL (ref 0.1–1.2)
ALBUMIN SERPL-MCNC: 4.2 G/DL (ref 3.5–5.2)
ALBUMIN/GLOBULIN RATIO: 1.8 (ref 1–2.5)
ALP BLD-CCNC: 49 U/L (ref 35–104)
ALT SERPL-CCNC: 13 U/L (ref 5–33)
AMYLASE: 57 U/L (ref 28–100)
ANION GAP SERPL CALCULATED.3IONS-SCNC: 10 MMOL/L (ref 9–17)
AST SERPL-CCNC: 12 U/L
BASOPHILS # BLD: 1 % (ref 0–1)
BASOPHILS ABSOLUTE: 0.1 K/UL (ref 0–0.2)
BILIRUB SERPL-MCNC: 0.19 MG/DL (ref 0.3–1.2)
BUN BLDV-MCNC: 20 MG/DL (ref 6–20)
BUN/CREAT BLD: 29 (ref 9–20)
CALCIUM SERPL-MCNC: 9.3 MG/DL (ref 8.6–10.4)
CHLORIDE BLD-SCNC: 102 MMOL/L (ref 98–107)
CO2: 29 MMOL/L (ref 20–31)
CREAT SERPL-MCNC: 0.69 MG/DL (ref 0.5–0.9)
DIFFERENTIAL TYPE: NORMAL
EOSINOPHILS RELATIVE PERCENT: 2 % (ref 1–7)
GFR AFRICAN AMERICAN: >60 ML/MIN
GFR NON-AFRICAN AMERICAN: >60 ML/MIN
GFR SERPL CREATININE-BSD FRML MDRD: ABNORMAL ML/MIN/{1.73_M2}
GFR SERPL CREATININE-BSD FRML MDRD: ABNORMAL ML/MIN/{1.73_M2}
GLUCOSE BLD-MCNC: 111 MG/DL (ref 70–99)
HCT VFR BLD CALC: 37.7 % (ref 36–46)
HEMOGLOBIN: 12.5 G/DL (ref 12–16)
IMMATURE GRANULOCYTES: NORMAL %
LIPASE: 31 U/L (ref 13–60)
LYMPHOCYTES # BLD: 31 % (ref 16–46)
MCH RBC QN AUTO: 30.4 PG (ref 26–34)
MCHC RBC AUTO-ENTMCNC: 33.3 G/DL (ref 31–37)
MCV RBC AUTO: 91.4 FL (ref 80–100)
MONOCYTES # BLD: 5 % (ref 4–11)
NRBC AUTOMATED: NORMAL PER 100 WBC
PDW BLD-RTO: 14.4 % (ref 11–14.5)
PLATELET # BLD: 261 K/UL (ref 140–450)
PLATELET ESTIMATE: NORMAL
PMV BLD AUTO: 7.5 FL (ref 6–12)
POTASSIUM SERPL-SCNC: 4.1 MMOL/L (ref 3.7–5.3)
RBC # BLD: 4.12 M/UL (ref 4–5.2)
RBC # BLD: NORMAL 10*6/UL
SEG NEUTROPHILS: 61 % (ref 43–77)
SEGMENTED NEUTROPHILS ABSOLUTE COUNT: 4.7 K/UL (ref 1.8–7.7)
SODIUM BLD-SCNC: 141 MMOL/L (ref 135–144)
TOTAL PROTEIN: 6.6 G/DL (ref 6.4–8.3)
WBC # BLD: 7.6 K/UL (ref 3.5–11)
WBC # BLD: NORMAL 10*3/UL

## 2018-12-06 PROCEDURE — 82150 ASSAY OF AMYLASE: CPT

## 2018-12-06 PROCEDURE — 74176 CT ABD & PELVIS W/O CONTRAST: CPT

## 2018-12-06 PROCEDURE — 36415 COLL VENOUS BLD VENIPUNCTURE: CPT

## 2018-12-06 PROCEDURE — 85025 COMPLETE CBC W/AUTO DIFF WBC: CPT

## 2018-12-06 PROCEDURE — 80053 COMPREHEN METABOLIC PANEL: CPT

## 2018-12-06 PROCEDURE — 83690 ASSAY OF LIPASE: CPT

## 2018-12-28 ENCOUNTER — HOSPITAL ENCOUNTER (OUTPATIENT)
Dept: LAB | Age: 58
Discharge: HOME | End: 2018-12-28
Payer: COMMERCIAL

## 2018-12-28 DIAGNOSIS — I63.9 CEREBROVASCULAR ACCIDENT (CVA), UNSPECIFIED MECHANISM (HCC): ICD-10-CM

## 2018-12-28 LAB
ALBUMIN SERPL-MCNC: 4.3 G/DL (ref 3.5–5.2)
ALBUMIN/GLOBULIN RATIO: 1.7 (ref 1–2.5)
ALP BLD-CCNC: 53 U/L (ref 35–104)
ALT SERPL-CCNC: 12 U/L (ref 5–33)
ANION GAP SERPL CALCULATED.3IONS-SCNC: 10 MMOL/L (ref 9–17)
AST SERPL-CCNC: 11 U/L
BILIRUB SERPL-MCNC: 0.28 MG/DL (ref 0.3–1.2)
BUN BLDV-MCNC: 13 MG/DL (ref 6–20)
BUN/CREAT BLD: 18 (ref 9–20)
CALCIUM SERPL-MCNC: 9.2 MG/DL (ref 8.6–10.4)
CHLORIDE BLD-SCNC: 105 MMOL/L (ref 98–107)
CHOLESTEROL/HDL RATIO: 3.2
CHOLESTEROL: 171 MG/DL
CO2: 28 MMOL/L (ref 20–31)
CREAT SERPL-MCNC: 0.71 MG/DL (ref 0.5–0.9)
GFR AFRICAN AMERICAN: >60 ML/MIN
GFR NON-AFRICAN AMERICAN: >60 ML/MIN
GFR SERPL CREATININE-BSD FRML MDRD: ABNORMAL ML/MIN/{1.73_M2}
GFR SERPL CREATININE-BSD FRML MDRD: ABNORMAL ML/MIN/{1.73_M2}
GLUCOSE BLD-MCNC: 92 MG/DL (ref 70–99)
HDLC SERPL-MCNC: 54 MG/DL
LDL CHOLESTEROL: 101 MG/DL (ref 0–130)
POTASSIUM SERPL-SCNC: 4.3 MMOL/L (ref 3.7–5.3)
SODIUM BLD-SCNC: 143 MMOL/L (ref 135–144)
TOTAL PROTEIN: 6.9 G/DL (ref 6.4–8.3)
TRIGL SERPL-MCNC: 82 MG/DL
VLDLC SERPL CALC-MCNC: NORMAL MG/DL (ref 1–30)

## 2018-12-28 PROCEDURE — 80053 COMPREHEN METABOLIC PANEL: CPT

## 2018-12-28 PROCEDURE — 80061 LIPID PANEL: CPT

## 2018-12-28 PROCEDURE — 36415 COLL VENOUS BLD VENIPUNCTURE: CPT

## 2019-01-11 ENCOUNTER — OFFICE VISIT (OUTPATIENT)
Dept: FAMILY MEDICINE CLINIC | Age: 59
End: 2019-01-11
Payer: COMMERCIAL

## 2019-01-11 VITALS
BODY MASS INDEX: 27.11 KG/M2 | WEIGHT: 153 LBS | SYSTOLIC BLOOD PRESSURE: 112 MMHG | HEIGHT: 63 IN | DIASTOLIC BLOOD PRESSURE: 60 MMHG | HEART RATE: 76 BPM

## 2019-01-11 DIAGNOSIS — K21.9 GASTROESOPHAGEAL REFLUX DISEASE WITHOUT ESOPHAGITIS: Primary | ICD-10-CM

## 2019-01-11 DIAGNOSIS — Z12.31 SCREENING MAMMOGRAM, ENCOUNTER FOR: ICD-10-CM

## 2019-01-11 DIAGNOSIS — I63.9 CEREBROVASCULAR ACCIDENT (CVA), UNSPECIFIED MECHANISM (HCC): ICD-10-CM

## 2019-01-11 DIAGNOSIS — J30.2 SEASONAL ALLERGIES: ICD-10-CM

## 2019-01-11 DIAGNOSIS — Z23 NEED FOR SHINGLES VACCINE: ICD-10-CM

## 2019-01-11 PROCEDURE — 99214 OFFICE O/P EST MOD 30 MIN: CPT | Performed by: PHYSICIAN ASSISTANT

## 2019-01-11 RX ORDER — PANTOPRAZOLE SODIUM 40 MG/1
40 TABLET, DELAYED RELEASE ORAL DAILY
Qty: 90 TABLET | Refills: 1 | Status: SHIPPED | OUTPATIENT
Start: 2019-01-11 | End: 2019-10-08 | Stop reason: SDUPTHER

## 2019-01-11 RX ORDER — CETIRIZINE HYDROCHLORIDE 10 MG/1
10 TABLET ORAL DAILY
Qty: 14 TABLET | Refills: 0 | Status: SHIPPED | OUTPATIENT
Start: 2019-01-11 | End: 2019-06-03 | Stop reason: ALTCHOICE

## 2019-01-11 RX ORDER — CETIRIZINE HYDROCHLORIDE 10 MG/1
10 TABLET ORAL DAILY
Qty: 90 TABLET | Refills: 1 | Status: SHIPPED | OUTPATIENT
Start: 2019-01-11 | End: 2019-07-01 | Stop reason: SDUPTHER

## 2019-01-11 RX ORDER — CLOPIDOGREL BISULFATE 75 MG/1
75 TABLET ORAL DAILY
Qty: 90 TABLET | Refills: 1 | Status: SHIPPED | OUTPATIENT
Start: 2019-01-11 | End: 2019-10-08 | Stop reason: SDUPTHER

## 2019-01-11 ASSESSMENT — PATIENT HEALTH QUESTIONNAIRE - PHQ9
SUM OF ALL RESPONSES TO PHQ QUESTIONS 1-9: 0
2. FEELING DOWN, DEPRESSED OR HOPELESS: 0
SUM OF ALL RESPONSES TO PHQ9 QUESTIONS 1 & 2: 0
SUM OF ALL RESPONSES TO PHQ QUESTIONS 1-9: 0
1. LITTLE INTEREST OR PLEASURE IN DOING THINGS: 0

## 2019-01-13 ASSESSMENT — ENCOUNTER SYMPTOMS
RESPIRATORY NEGATIVE: 1
HEARTBURN: 1
ABDOMINAL PAIN: 0

## 2019-01-31 ENCOUNTER — OFFICE VISIT (OUTPATIENT)
Dept: PRIMARY CARE CLINIC | Age: 59
End: 2019-01-31

## 2019-01-31 ENCOUNTER — OFFICE VISIT (OUTPATIENT)
Dept: OPHTHALMOLOGY | Age: 59
End: 2019-01-31
Payer: COMMERCIAL

## 2019-01-31 VITALS
BODY MASS INDEX: 26.6 KG/M2 | SYSTOLIC BLOOD PRESSURE: 116 MMHG | HEART RATE: 80 BPM | OXYGEN SATURATION: 98 % | DIASTOLIC BLOOD PRESSURE: 70 MMHG | WEIGHT: 155.8 LBS | HEIGHT: 64 IN | TEMPERATURE: 97.8 F

## 2019-01-31 DIAGNOSIS — H00.011 HORDEOLUM EXTERNUM OF RIGHT UPPER EYELID: Primary | ICD-10-CM

## 2019-01-31 DIAGNOSIS — H02.843 SWELLING OF GLAND OF RIGHT EYELID: Primary | ICD-10-CM

## 2019-01-31 DIAGNOSIS — L03.213 PRESEPTAL CELLULITIS OF RIGHT UPPER EYELID: ICD-10-CM

## 2019-01-31 PROCEDURE — 99213 OFFICE O/P EST LOW 20 MIN: CPT | Performed by: OPHTHALMOLOGY

## 2019-01-31 PROCEDURE — 67700 BLEPHAROTOMY DRG ABSC EYELID: CPT | Performed by: OPHTHALMOLOGY

## 2019-01-31 PROCEDURE — 99999 PR OFFICE/OUTPT VISIT,PROCEDURE ONLY: CPT | Performed by: NURSE PRACTITIONER

## 2019-01-31 RX ORDER — SULFAMETHOXAZOLE AND TRIMETHOPRIM 800; 160 MG/1; MG/1
1 TABLET ORAL 2 TIMES DAILY
Qty: 20 TABLET | Refills: 1 | Status: SHIPPED | OUTPATIENT
Start: 2019-01-31 | End: 2019-02-10

## 2019-01-31 ASSESSMENT — SLIT LAMP EXAM - LIDS
COMMENTS: MILD BLEPHARITIS. MILD MGD
COMMENTS: MILD BLEPHARITIS. MILD MGD

## 2019-01-31 ASSESSMENT — ENCOUNTER SYMPTOMS
EYES NEGATIVE: 0
ALLERGIC/IMMUNOLOGIC NEGATIVE: 0
RESPIRATORY NEGATIVE: 0
GASTROINTESTINAL NEGATIVE: 0

## 2019-01-31 ASSESSMENT — VISUAL ACUITY
METHOD: SNELLEN - LINEAR
OD_PH_SC: 20/40
OS_SC: 20/30
OS_PH_SC: 20/25
OD_SC: 20/50

## 2019-01-31 ASSESSMENT — CONF VISUAL FIELD
OD_NORMAL: 1
OS_NORMAL: 1

## 2019-01-31 ASSESSMENT — TONOMETRY
OS_IOP_MMHG: 9
IOP_METHOD: NON-CONTACT AIR PUFF

## 2019-02-04 ENCOUNTER — OFFICE VISIT (OUTPATIENT)
Dept: OPHTHALMOLOGY | Age: 59
End: 2019-02-04

## 2019-02-04 DIAGNOSIS — L03.213 PRESEPTAL CELLULITIS OF RIGHT UPPER EYELID: Primary | ICD-10-CM

## 2019-02-04 PROCEDURE — 99024 POSTOP FOLLOW-UP VISIT: CPT | Performed by: OPHTHALMOLOGY

## 2019-02-04 ASSESSMENT — TONOMETRY
OS_IOP_MMHG: 7
OD_IOP_MMHG: 14
IOP_METHOD: NON-CONTACT AIR PUFF

## 2019-02-04 ASSESSMENT — SLIT LAMP EXAM - LIDS
COMMENTS: MILD BLEPHARITIS. MILD MGD
COMMENTS: MILD BLEPHARITIS. MILD MGD

## 2019-02-04 ASSESSMENT — VISUAL ACUITY
OD_SC: 20/30
METHOD: SNELLEN - LINEAR
OS_SC: 20/25
OD_SC+: 2

## 2019-02-14 ENCOUNTER — HOSPITAL ENCOUNTER (OUTPATIENT)
Dept: MAMMOGRAPHY | Age: 59
Discharge: HOME | End: 2019-02-16
Payer: COMMERCIAL

## 2019-02-14 DIAGNOSIS — Z12.31 SCREENING MAMMOGRAM, ENCOUNTER FOR: ICD-10-CM

## 2019-02-14 PROCEDURE — 77063 BREAST TOMOSYNTHESIS BI: CPT

## 2019-02-15 ENCOUNTER — TELEPHONE (OUTPATIENT)
Dept: FAMILY MEDICINE CLINIC | Age: 59
End: 2019-02-15

## 2019-02-15 DIAGNOSIS — I63.9 CEREBROVASCULAR ACCIDENT (CVA), UNSPECIFIED MECHANISM (HCC): Primary | ICD-10-CM

## 2019-05-31 ENCOUNTER — TELEPHONE (OUTPATIENT)
Dept: FAMILY MEDICINE CLINIC | Age: 59
End: 2019-05-31

## 2019-05-31 DIAGNOSIS — I63.9 CEREBROVASCULAR ACCIDENT (CVA), UNSPECIFIED MECHANISM (HCC): ICD-10-CM

## 2019-05-31 DIAGNOSIS — R73.01 IFG (IMPAIRED FASTING GLUCOSE): Primary | ICD-10-CM

## 2019-05-31 NOTE — TELEPHONE ENCOUNTER
Patient calling stating that she has an appointment in July, and she wants to know if she needs to complete labs before appointment. Patient states that if so she would like the orders to complete at work. Please call patient and advise.

## 2019-06-03 ENCOUNTER — OFFICE VISIT (OUTPATIENT)
Dept: PRIMARY CARE CLINIC | Age: 59
End: 2019-06-03
Payer: COMMERCIAL

## 2019-06-03 VITALS
TEMPERATURE: 98.2 F | OXYGEN SATURATION: 98 % | HEIGHT: 64 IN | BODY MASS INDEX: 26.46 KG/M2 | RESPIRATION RATE: 16 BRPM | HEART RATE: 74 BPM | WEIGHT: 155 LBS | DIASTOLIC BLOOD PRESSURE: 60 MMHG | SYSTOLIC BLOOD PRESSURE: 118 MMHG

## 2019-06-03 DIAGNOSIS — B37.9 ANTIBIOTIC-INDUCED YEAST INFECTION: ICD-10-CM

## 2019-06-03 DIAGNOSIS — H66.002 ACUTE SUPPURATIVE OTITIS MEDIA OF LEFT EAR WITHOUT SPONTANEOUS RUPTURE OF TYMPANIC MEMBRANE, RECURRENCE NOT SPECIFIED: Primary | ICD-10-CM

## 2019-06-03 DIAGNOSIS — T36.95XA ANTIBIOTIC-INDUCED YEAST INFECTION: ICD-10-CM

## 2019-06-03 PROCEDURE — 99213 OFFICE O/P EST LOW 20 MIN: CPT | Performed by: NURSE PRACTITIONER

## 2019-06-03 RX ORDER — FLUCONAZOLE 150 MG/1
TABLET ORAL
Qty: 2 TABLET | Refills: 0 | Status: SHIPPED | OUTPATIENT
Start: 2019-06-03 | End: 2020-01-21 | Stop reason: ALTCHOICE

## 2019-06-03 RX ORDER — AMOXICILLIN 500 MG/1
500 CAPSULE ORAL 2 TIMES DAILY
Qty: 20 CAPSULE | Refills: 0 | Status: SHIPPED | OUTPATIENT
Start: 2019-06-03 | End: 2019-06-13

## 2019-06-03 ASSESSMENT — ENCOUNTER SYMPTOMS
COUGH: 1
VOMITING: 0
SORE THROAT: 0
WHEEZING: 0
SHORTNESS OF BREATH: 0
ABDOMINAL PAIN: 0
DIARRHEA: 0
RHINORRHEA: 0

## 2019-06-03 NOTE — PATIENT INSTRUCTIONS
Patient Education        Ear Infection (Otitis Media): Care Instructions  Your Care Instructions    An ear infection may start with a cold and affect the middle ear (otitis media). It can hurt a lot. Most ear infections clear up on their own in a couple of days. Most often you will not need antibiotics. This is because many ear infections are caused by a virus. Antibiotics don't work against a virus. Regular doses of pain medicines are the best way to reduce your fever and help you feel better. Follow-up care is a key part of your treatment and safety. Be sure to make and go to all appointments, and call your doctor if you are having problems. It's also a good idea to know your test results and keep a list of the medicines you take. How can you care for yourself at home? · Take pain medicines exactly as directed. ? If the doctor gave you a prescription medicine for pain, take it as prescribed. ? If you are not taking a prescription pain medicine, take an over-the-counter medicine, such as acetaminophen (Tylenol), ibuprofen (Advil, Motrin), or naproxen (Aleve). Read and follow all instructions on the label. ? Do not take two or more pain medicines at the same time unless the doctor told you to. Many pain medicines have acetaminophen, which is Tylenol. Too much acetaminophen (Tylenol) can be harmful. · Plan to take a full dose of pain reliever before bedtime. Getting enough sleep will help you get better. · Try a warm, moist washcloth on the ear. It may help relieve pain. · If your doctor prescribed antibiotics, take them as directed. Do not stop taking them just because you feel better. You need to take the full course of antibiotics. When should you call for help?   Call your doctor now or seek immediate medical care if:    · You have new or increasing ear pain.     · You have new or increasing pus or blood draining from your ear.     · You have a fever with a stiff neck or a severe headache.    Watch closely for changes in your health, and be sure to contact your doctor if:    · You have new or worse symptoms.     · You are not getting better after taking an antibiotic for 2 days. Where can you learn more? Go to https://[email or web address] org and sign in to your [employer] account. Enter Y215 in the [employer] box to learn more about \"Ear Infection (Otitis Media): Care Instructions. \"     If you do not have an account, please click on the \"Sign Up Now\" link. Current as of: October 21, 2018  Content Version: 12.0  © 4679-6055 Healthwise, Incorporated. Care instructions adapted under license by [facility] ([facility]). If you have questions about a medical condition or this instruction, always ask your healthcare professional. [address] any warranty or liability for your use of this information.

## 2019-06-03 NOTE — PROGRESS NOTES
sinus exhibits no frontal sinus tenderness. Left sinus exhibits maxillary sinus tenderness. Left sinus exhibits no frontal sinus tenderness. Mouth/Throat: Uvula is midline, oropharynx is clear and moist and mucous membranes are normal.   Eyes: Pupils are equal, round, and reactive to light. Neck: Normal range of motion. Neck supple. Cardiovascular: Normal rate, regular rhythm and normal heart sounds. Pulmonary/Chest: Effort normal and breath sounds normal.   Lymphadenopathy:        Head (right side): Tonsillar adenopathy present. Head (left side): Tonsillar adenopathy present. She has cervical adenopathy. Neurological: She is alert and oriented to person, place, and time. Skin: Skin is warm and dry. Psychiatric: She has a normal mood and affect. Her behavior is normal. Thought content normal.   Nursing note and vitals reviewed. Assessment and Plan:     Diagnosis Orders   1. Acute suppurative otitis media of left ear without spontaneous rupture of tympanic membrane, recurrence not specified  amoxicillin (AMOXIL) 500 MG capsule   2. Antibiotic-induced yeast infection  fluconazole (DIFLUCAN) 150 MG tablet     Take full course of antibiotic. Continue Zyrtec daily. Take Tylenol for fever or pain. Keep ear dry and clean. Follow up with PCP if symptoms persist or worsen.       Electronically signed by STEF Taylor CNP on 6/3/19 at 4:18 PM

## 2019-06-10 ENCOUNTER — TELEPHONE (OUTPATIENT)
Dept: FAMILY MEDICINE CLINIC | Age: 59
End: 2019-06-10

## 2019-06-14 ENCOUNTER — TELEPHONE (OUTPATIENT)
Dept: FAMILY MEDICINE CLINIC | Age: 59
End: 2019-06-14

## 2019-06-14 DIAGNOSIS — H92.02 EAR PAIN, LEFT: ICD-10-CM

## 2019-06-14 DIAGNOSIS — H66.007 RECURRENT ACUTE SUPPURATIVE OTITIS MEDIA WITHOUT SPONTANEOUS RUPTURE OF TYMPANIC MEMBRANE, UNSPECIFIED LATERALITY: Primary | ICD-10-CM

## 2019-06-14 NOTE — TELEPHONE ENCOUNTER
Pt calling stating she was seen in UC for otitis media, pt states she took her last antibiotic yesterday but still has a lot of L ear pain, questions if she needs to see an ENT or what do you recommend, please advise at above number, might need to leave a message as pt is working.

## 2019-07-01 DIAGNOSIS — J30.2 SEASONAL ALLERGIES: ICD-10-CM

## 2019-07-01 RX ORDER — CETIRIZINE HYDROCHLORIDE 10 MG/1
TABLET ORAL
Qty: 90 TABLET | Refills: 0 | Status: SHIPPED | OUTPATIENT
Start: 2019-07-01 | End: 2020-05-26 | Stop reason: SDUPTHER

## 2019-07-11 ENCOUNTER — OFFICE VISIT (OUTPATIENT)
Dept: OTOLARYNGOLOGY | Age: 59
End: 2019-07-11
Payer: COMMERCIAL

## 2019-07-11 VITALS
SYSTOLIC BLOOD PRESSURE: 100 MMHG | BODY MASS INDEX: 26.46 KG/M2 | DIASTOLIC BLOOD PRESSURE: 60 MMHG | RESPIRATION RATE: 14 BRPM | HEIGHT: 64 IN | WEIGHT: 155 LBS | HEART RATE: 72 BPM

## 2019-07-11 DIAGNOSIS — H92.02 LEFT EAR PAIN: Primary | ICD-10-CM

## 2019-07-11 PROCEDURE — 99203 OFFICE O/P NEW LOW 30 MIN: CPT | Performed by: OTOLARYNGOLOGY

## 2019-07-16 ENCOUNTER — TELEPHONE (OUTPATIENT)
Dept: FAMILY MEDICINE CLINIC | Age: 59
End: 2019-07-16

## 2019-07-23 ENCOUNTER — OFFICE VISIT (OUTPATIENT)
Dept: OBGYN | Age: 59
End: 2019-07-23
Payer: COMMERCIAL

## 2019-07-23 ENCOUNTER — HOSPITAL ENCOUNTER (OUTPATIENT)
Age: 59
Setting detail: SPECIMEN
Discharge: HOME | End: 2019-07-23
Payer: COMMERCIAL

## 2019-07-23 VITALS
WEIGHT: 155 LBS | SYSTOLIC BLOOD PRESSURE: 124 MMHG | RESPIRATION RATE: 20 BRPM | DIASTOLIC BLOOD PRESSURE: 78 MMHG | BODY MASS INDEX: 26.46 KG/M2 | HEIGHT: 64 IN | HEART RATE: 72 BPM

## 2019-07-23 DIAGNOSIS — Z12.4 PAP SMEAR FOR CERVICAL CANCER SCREENING: ICD-10-CM

## 2019-07-23 DIAGNOSIS — Z90.710 HISTORY OF HYSTERECTOMY: ICD-10-CM

## 2019-07-23 DIAGNOSIS — N95.1 MENOPAUSAL AND FEMALE CLIMACTERIC STATES: ICD-10-CM

## 2019-07-23 DIAGNOSIS — Z12.4 PAP SMEAR FOR CERVICAL CANCER SCREENING: Primary | ICD-10-CM

## 2019-07-23 PROCEDURE — 99386 PREV VISIT NEW AGE 40-64: CPT | Performed by: OBSTETRICS & GYNECOLOGY

## 2019-07-23 PROCEDURE — G0145 SCR C/V CYTO,THINLAYER,RESCR: HCPCS

## 2019-07-23 NOTE — PROGRESS NOTES
Subjective:      Patient ID: Lorenza Lopez  is a 64 y.o. y.o. Female. H/o hysterectomy 1997     For pap smear      HPI  Chief Complaint   Patient presents with    Gynecologic Exam     pap      Family History   Problem Relation Age of Onset    Diabetes Mother     Heart Disease Father         afib    High Blood Pressure Father     Diabetes Maternal Grandmother     Cancer Maternal Grandmother         breast    Cancer Maternal Grandfather         brain    Heart Disease Paternal Grandfather     Diabetes Sister     Thyroid Disease Sister     Mental Retardation Sister     Cancer Brother         colon, prostate, lung    Diabetes Brother     Diabetes Brother     Other Brother         hyperglycemia    Heart Disease Brother         atrial fibrillation    Other Other         Jack Syndrome/Jack Syndrome    Other Grandchild         2nd Grandson with Hunters Syndrome     Past Medical History:   Diagnosis Date    Anxiety     Cerebrovascular disease     Mini stroke in 2006    CVA (cerebral infarction)     Facial weakness     left    Family history of colon cancer     Gastroesophageal reflux disease without esophagitis 5/31/2016    History of TIAs     Hypoglycemia     Left hemiparesis ([facility] [address]     Memory problem     Migraine     Sleep difficulties     Speech abnormality     Tobacco abuse     Unspecified sleep apnea      Past Surgical History:   Procedure Laterality Date    APPENDECTOMY      CARDIAC CATHETERIZATION  7-2-15    nml    CHOLECYSTECTOMY      COLONOSCOPY  4/29/2015    2 polyps, sigmoid diverticulosis    FOOT SURGERY Right     HYSTERECTOMY      OVARIAN CYST SURGERY      TONSILLECTOMY        reports that she has been smoking. She has a 10.00 pack-year smoking history. She has never used smokeless tobacco. She reports that she does not drink alcohol or use drugs.   Allergies   Allergen Reactions    Tape Claudette Muniz Tape] Rash       Current Outpatient Medications:   

## 2019-07-25 LAB — CYTOLOGY REPORT: NORMAL

## 2019-08-13 ENCOUNTER — HOSPITAL ENCOUNTER (OUTPATIENT)
Age: 59
Discharge: HOME | End: 2019-08-15
Payer: COMMERCIAL

## 2019-08-13 ENCOUNTER — OFFICE VISIT (OUTPATIENT)
Dept: PRIMARY CARE CLINIC | Age: 59
End: 2019-08-13
Payer: COMMERCIAL

## 2019-08-13 ENCOUNTER — HOSPITAL ENCOUNTER (OUTPATIENT)
Dept: GENERAL RADIOLOGY | Age: 59
Discharge: HOME | End: 2019-08-15
Payer: COMMERCIAL

## 2019-08-13 VITALS
HEART RATE: 74 BPM | BODY MASS INDEX: 27.14 KG/M2 | TEMPERATURE: 98.7 F | OXYGEN SATURATION: 96 % | DIASTOLIC BLOOD PRESSURE: 68 MMHG | HEIGHT: 64 IN | WEIGHT: 159 LBS | SYSTOLIC BLOOD PRESSURE: 118 MMHG

## 2019-08-13 DIAGNOSIS — S60.222A TRAUMATIC HEMATOMA OF LEFT HAND, INITIAL ENCOUNTER: ICD-10-CM

## 2019-08-13 DIAGNOSIS — M79.642 LEFT HAND PAIN: ICD-10-CM

## 2019-08-13 DIAGNOSIS — J01.40 ACUTE NON-RECURRENT PANSINUSITIS: Primary | ICD-10-CM

## 2019-08-13 PROCEDURE — 99214 OFFICE O/P EST MOD 30 MIN: CPT | Performed by: FAMILY MEDICINE

## 2019-08-13 PROCEDURE — 73130 X-RAY EXAM OF HAND: CPT

## 2019-08-13 RX ORDER — AMOXICILLIN 875 MG/1
875 TABLET, COATED ORAL 2 TIMES DAILY
Qty: 20 TABLET | Refills: 0 | Status: SHIPPED | OUTPATIENT
Start: 2019-08-13 | End: 2020-01-21 | Stop reason: ALTCHOICE

## 2019-08-13 RX ORDER — PREDNISONE 20 MG/1
TABLET ORAL
Qty: 10 TABLET | Refills: 0 | Status: SHIPPED | OUTPATIENT
Start: 2019-08-13 | End: 2020-01-21 | Stop reason: ALTCHOICE

## 2019-08-13 RX ORDER — BENZONATATE 100 MG/1
100 CAPSULE ORAL 3 TIMES DAILY PRN
Qty: 30 CAPSULE | Refills: 1 | Status: SHIPPED | OUTPATIENT
Start: 2019-08-13 | End: 2020-06-17 | Stop reason: ALTCHOICE

## 2019-08-13 ASSESSMENT — ENCOUNTER SYMPTOMS
SINUS PRESSURE: 1
SHORTNESS OF BREATH: 0
HOARSE VOICE: 1
SINUS COMPLAINT: 1
SORE THROAT: 1
COUGH: 1

## 2019-08-13 NOTE — PROGRESS NOTES
Gastrointestinal: Negative for abdominal pain, constipation, diarrhea, nausea and vomiting. Musculoskeletal: Positive for arthralgias and joint swelling. Negative for myalgias and neck pain. Skin: Positive for color change. Negative for rash and wound. Allergic/Immunologic: Negative for environmental allergies. Neurological: Positive for headaches. Negative for dizziness, weakness and light-headedness. Hematological: Negative for adenopathy. Psychiatric/Behavioral: Negative. Prior to Visit Medications    Medication Sig Taking? Authorizing Provider   cetirizine (ZYRTEC) 10 MG tablet TAKE 1 TABLET DAILY Yes Дмитрий Pruitt MD   fluconazole (DIFLUCAN) 150 MG tablet Take 1 tab now and then repeat in 72 hours.  Yes STEF Mccoy - CNP   clopidogrel (PLAVIX) 75 MG tablet Take 1 tablet by mouth daily Yes RUSSELL Myers   pantoprazole (PROTONIX) 40 MG tablet Take 1 tablet by mouth daily Yes RUSSELL Myers   Cyanocobalamin (VITAMIN B 12 PO) Take by mouth Yes Historical Provider, MD   Cholecalciferol (VITAMIN D3) 5000 UNITS TABS Take 1 each by mouth daily Yes Historical Provider, MD   zoster recombinant adjuvanted vaccine [facility]) 50 MCG/0.5ML SUSR injection Inject 0.5 mLs into the muscle See Admin Instructions 1 dose now and repeat in 2-6 months  RUSSELL Myers   Compression Stockings MISC by Does not apply route Bilateral knee high, 20-30 mmHg  Alize [place]        Social History     Tobacco Use    Smoking status: Current Every Day Smoker     Packs/day: 0.50     Years: 20.00     Pack years: 10.00    Smokeless tobacco: Never Used   Substance Use Topics    Alcohol use: No     Alcohol/week: 0.0 standard drinks        Vitals:    08/13/19 1824   BP: 118/68   Site: Left Upper Arm   Pulse: 74   Temp: 98.7 °F (37.1 °C)   TempSrc: Tympanic   SpO2: 96%   Weight: 159 lb (72.1 kg)   Height: 5' 4\" (1.626 m)     Estimated body mass index is 27.29 kg/m² as calculated from the following:

## 2019-08-19 ASSESSMENT — ENCOUNTER SYMPTOMS
EYE DISCHARGE: 0
DIARRHEA: 0
COLOR CHANGE: 1
ABDOMINAL PAIN: 0
SINUS PAIN: 1
CONSTIPATION: 0
WHEEZING: 0
TROUBLE SWALLOWING: 0
EYE REDNESS: 0
NAUSEA: 0
RHINORRHEA: 1
VOMITING: 0

## 2019-08-23 ENCOUNTER — OFFICE VISIT (OUTPATIENT)
Dept: OPHTHALMOLOGY | Age: 59
End: 2019-08-23
Payer: COMMERCIAL

## 2019-08-23 ENCOUNTER — TELEPHONE (OUTPATIENT)
Dept: FAMILY MEDICINE CLINIC | Age: 59
End: 2019-08-23

## 2019-08-23 DIAGNOSIS — H01.02A SQUAMOUS BLEPHARITIS OF UPPER AND LOWER EYELIDS OF BOTH EYES: Primary | ICD-10-CM

## 2019-08-23 DIAGNOSIS — H01.02B SQUAMOUS BLEPHARITIS OF UPPER AND LOWER EYELIDS OF BOTH EYES: Primary | ICD-10-CM

## 2019-08-23 PROCEDURE — 99213 OFFICE O/P EST LOW 20 MIN: CPT | Performed by: OPHTHALMOLOGY

## 2019-08-23 ASSESSMENT — SLIT LAMP EXAM - LIDS: COMMENTS: MILD BLEPHARITIS. MILD MGD

## 2019-08-23 ASSESSMENT — TONOMETRY
OS_IOP_MMHG: 10
OD_IOP_MMHG: 13
IOP_METHOD: NON-CONTACT AIR PUFF

## 2019-08-23 ASSESSMENT — VISUAL ACUITY
CORRECTION_TYPE: GLASSES
OS_CC: 20/20
METHOD: SNELLEN - LINEAR
OD_PH_CC: 20/20

## 2019-09-16 ENCOUNTER — TELEPHONE (OUTPATIENT)
Dept: FAMILY MEDICINE CLINIC | Age: 59
End: 2019-09-16

## 2019-09-18 LAB
ALBUMIN SERPL-MCNC: 4.4 G/DL
ALP BLD-CCNC: 60 U/L
ALT SERPL-CCNC: 13 U/L
ANION GAP SERPL CALCULATED.3IONS-SCNC: 15 MMOL/L
AST SERPL-CCNC: 13 U/L
AVERAGE GLUCOSE: 111
BILIRUB SERPL-MCNC: 0.34 MG/DL (ref 0.1–1.4)
BUN BLDV-MCNC: 19 MG/DL
CALCIUM SERPL-MCNC: 9.3 MG/DL
CHLORIDE BLD-SCNC: 106 MMOL/L
CO2: 24 MMOL/L
CREAT SERPL-MCNC: 0.62 MG/DL
GFR CALCULATED: >60
GLUCOSE BLD-MCNC: 90 MG/DL
HBA1C MFR BLD: 5.5 %
POTASSIUM SERPL-SCNC: 4.7 MMOL/L
SODIUM BLD-SCNC: 145 MMOL/L
TOTAL PROTEIN: 6.5

## 2019-09-20 ENCOUNTER — TELEPHONE (OUTPATIENT)
Dept: FAMILY MEDICINE CLINIC | Age: 59
End: 2019-09-20

## 2019-09-20 DIAGNOSIS — R73.01 IFG (IMPAIRED FASTING GLUCOSE): ICD-10-CM

## 2019-09-20 DIAGNOSIS — I63.9 CEREBROVASCULAR ACCIDENT (CVA), UNSPECIFIED MECHANISM (HCC): ICD-10-CM

## 2019-09-20 PROCEDURE — 83036 HEMOGLOBIN GLYCOSYLATED A1C: CPT | Performed by: PHYSICIAN ASSISTANT

## 2019-09-20 PROCEDURE — 80053 COMPREHEN METABOLIC PANEL: CPT | Performed by: PHYSICIAN ASSISTANT

## 2019-10-05 ENCOUNTER — OFFICE VISIT (OUTPATIENT)
Dept: PRIMARY CARE CLINIC | Age: 59
End: 2019-10-05
Payer: COMMERCIAL

## 2019-10-05 VITALS
WEIGHT: 162.6 LBS | DIASTOLIC BLOOD PRESSURE: 62 MMHG | BODY MASS INDEX: 27.76 KG/M2 | HEART RATE: 68 BPM | HEIGHT: 64 IN | TEMPERATURE: 97.5 F | SYSTOLIC BLOOD PRESSURE: 112 MMHG | RESPIRATION RATE: 18 BRPM

## 2019-10-05 DIAGNOSIS — H66.002 ACUTE SUPPURATIVE OTITIS MEDIA OF LEFT EAR WITHOUT SPONTANEOUS RUPTURE OF TYMPANIC MEMBRANE, RECURRENCE NOT SPECIFIED: Primary | ICD-10-CM

## 2019-10-05 DIAGNOSIS — J01.00 ACUTE MAXILLARY SINUSITIS, RECURRENCE NOT SPECIFIED: ICD-10-CM

## 2019-10-05 PROCEDURE — 99213 OFFICE O/P EST LOW 20 MIN: CPT | Performed by: PHYSICIAN ASSISTANT

## 2019-10-05 RX ORDER — PREDNISONE 20 MG/1
20 TABLET ORAL 2 TIMES DAILY
Qty: 10 TABLET | Refills: 0 | Status: SHIPPED | OUTPATIENT
Start: 2019-10-05 | End: 2019-10-10

## 2019-10-05 RX ORDER — AZITHROMYCIN 250 MG/1
250 TABLET, FILM COATED ORAL SEE ADMIN INSTRUCTIONS
Qty: 6 TABLET | Refills: 0 | Status: SHIPPED | OUTPATIENT
Start: 2019-10-05 | End: 2019-10-10

## 2019-10-05 RX ORDER — FLUCONAZOLE 150 MG/1
TABLET ORAL
Qty: 2 TABLET | Refills: 1 | Status: SHIPPED | OUTPATIENT
Start: 2019-10-05 | End: 2020-01-21 | Stop reason: ALTCHOICE

## 2019-10-05 ASSESSMENT — ENCOUNTER SYMPTOMS
WHEEZING: 0
SORE THROAT: 1
RHINORRHEA: 1
COUGH: 1
SINUS PAIN: 1

## 2019-10-08 DIAGNOSIS — I63.9 CEREBROVASCULAR ACCIDENT (CVA), UNSPECIFIED MECHANISM (HCC): ICD-10-CM

## 2019-10-08 DIAGNOSIS — K21.9 GASTROESOPHAGEAL REFLUX DISEASE WITHOUT ESOPHAGITIS: ICD-10-CM

## 2019-10-08 RX ORDER — PANTOPRAZOLE SODIUM 40 MG/1
40 TABLET, DELAYED RELEASE ORAL DAILY
Qty: 90 TABLET | Refills: 0 | Status: SHIPPED | OUTPATIENT
Start: 2019-10-08 | End: 2020-01-14 | Stop reason: SDUPTHER

## 2019-10-08 RX ORDER — CLOPIDOGREL BISULFATE 75 MG/1
75 TABLET ORAL DAILY
Qty: 90 TABLET | Refills: 0 | Status: SHIPPED | OUTPATIENT
Start: 2019-10-08 | End: 2020-01-14 | Stop reason: SDUPTHER

## 2019-10-27 ENCOUNTER — APPOINTMENT (OUTPATIENT)
Dept: CT IMAGING | Age: 59
End: 2019-10-27
Payer: COMMERCIAL

## 2019-10-27 ENCOUNTER — HOSPITAL ENCOUNTER (EMERGENCY)
Age: 59
Discharge: HOME | End: 2019-10-27
Attending: SPECIALIST
Payer: COMMERCIAL

## 2019-10-27 VITALS
RESPIRATION RATE: 16 BRPM | TEMPERATURE: 97.5 F | HEIGHT: 64 IN | HEART RATE: 72 BPM | WEIGHT: 160 LBS | DIASTOLIC BLOOD PRESSURE: 60 MMHG | OXYGEN SATURATION: 97 % | BODY MASS INDEX: 27.31 KG/M2 | SYSTOLIC BLOOD PRESSURE: 126 MMHG

## 2019-10-27 DIAGNOSIS — G43.909 MIGRAINE SYNDROME: Primary | ICD-10-CM

## 2019-10-27 DIAGNOSIS — R20.2 TINGLING SENSATION: ICD-10-CM

## 2019-10-27 PROCEDURE — 96375 TX/PRO/DX INJ NEW DRUG ADDON: CPT

## 2019-10-27 PROCEDURE — 6360000002 HC RX W HCPCS: Performed by: SPECIALIST

## 2019-10-27 PROCEDURE — 2580000003 HC RX 258: Performed by: SPECIALIST

## 2019-10-27 PROCEDURE — 99284 EMERGENCY DEPT VISIT MOD MDM: CPT

## 2019-10-27 PROCEDURE — 70450 CT HEAD/BRAIN W/O DYE: CPT

## 2019-10-27 PROCEDURE — 96374 THER/PROPH/DIAG INJ IV PUSH: CPT

## 2019-10-27 PROCEDURE — 6370000000 HC RX 637 (ALT 250 FOR IP): Performed by: SPECIALIST

## 2019-10-27 RX ORDER — KETOROLAC TROMETHAMINE 30 MG/ML
30 INJECTION, SOLUTION INTRAMUSCULAR; INTRAVENOUS ONCE
Status: COMPLETED | OUTPATIENT
Start: 2019-10-27 | End: 2019-10-27

## 2019-10-27 RX ORDER — ONDANSETRON 2 MG/ML
4 INJECTION INTRAMUSCULAR; INTRAVENOUS ONCE
Status: COMPLETED | OUTPATIENT
Start: 2019-10-27 | End: 2019-10-27

## 2019-10-27 RX ORDER — 0.9 % SODIUM CHLORIDE 0.9 %
1000 INTRAVENOUS SOLUTION INTRAVENOUS ONCE
Status: COMPLETED | OUTPATIENT
Start: 2019-10-27 | End: 2019-10-27

## 2019-10-27 RX ORDER — ACETAMINOPHEN 500 MG
1000 TABLET ORAL ONCE
Status: COMPLETED | OUTPATIENT
Start: 2019-10-27 | End: 2019-10-27

## 2019-10-27 RX ADMIN — SODIUM CHLORIDE 1000 ML: 9 INJECTION, SOLUTION INTRAVENOUS at 22:41

## 2019-10-27 RX ADMIN — ACETAMINOPHEN 1000 MG: 500 TABLET, FILM COATED ORAL at 23:43

## 2019-10-27 RX ADMIN — KETOROLAC TROMETHAMINE 30 MG: 30 INJECTION, SOLUTION INTRAMUSCULAR at 23:16

## 2019-10-27 RX ADMIN — ONDANSETRON 4 MG: 2 INJECTION INTRAMUSCULAR; INTRAVENOUS at 22:41

## 2019-10-27 ASSESSMENT — PAIN DESCRIPTION - FREQUENCY
FREQUENCY: CONTINUOUS
FREQUENCY: CONTINUOUS

## 2019-10-27 ASSESSMENT — ENCOUNTER SYMPTOMS
PHOTOPHOBIA: 1
NAUSEA: 1
VOMITING: 0
ABDOMINAL PAIN: 0

## 2019-10-27 ASSESSMENT — PAIN SCALES - GENERAL
PAINLEVEL_OUTOF10: 5
PAINLEVEL_OUTOF10: 10

## 2019-10-27 ASSESSMENT — PAIN DESCRIPTION - PAIN TYPE
TYPE: ACUTE PAIN
TYPE: ACUTE PAIN

## 2019-10-27 ASSESSMENT — PAIN - FUNCTIONAL ASSESSMENT: PAIN_FUNCTIONAL_ASSESSMENT: 0-10

## 2019-10-27 ASSESSMENT — PAIN DESCRIPTION - DESCRIPTORS
DESCRIPTORS: ACHING
DESCRIPTORS: ACHING

## 2019-10-27 ASSESSMENT — PAIN DESCRIPTION - PROGRESSION: CLINICAL_PROGRESSION: GRADUALLY IMPROVING

## 2019-10-27 ASSESSMENT — PAIN DESCRIPTION - LOCATION
LOCATION: HEAD
LOCATION: HEAD

## 2019-10-27 ASSESSMENT — PAIN DESCRIPTION - ONSET: ONSET: ON-GOING

## 2019-10-28 ENCOUNTER — TELEPHONE (OUTPATIENT)
Dept: FAMILY MEDICINE CLINIC | Age: 59
End: 2019-10-28

## 2020-01-14 RX ORDER — CLOPIDOGREL BISULFATE 75 MG/1
75 TABLET ORAL DAILY
Qty: 90 TABLET | Refills: 0 | Status: SHIPPED | OUTPATIENT
Start: 2020-01-14 | End: 2020-04-20 | Stop reason: SDUPTHER

## 2020-01-14 RX ORDER — PANTOPRAZOLE SODIUM 40 MG/1
40 TABLET, DELAYED RELEASE ORAL DAILY
Qty: 90 TABLET | Refills: 0 | Status: SHIPPED | OUTPATIENT
Start: 2020-01-14 | End: 2020-04-20 | Stop reason: SDUPTHER

## 2020-01-21 ENCOUNTER — OFFICE VISIT (OUTPATIENT)
Dept: FAMILY MEDICINE CLINIC | Age: 60
End: 2020-01-21
Payer: COMMERCIAL

## 2020-01-21 VITALS
HEART RATE: 64 BPM | DIASTOLIC BLOOD PRESSURE: 78 MMHG | BODY MASS INDEX: 29.02 KG/M2 | WEIGHT: 170 LBS | HEIGHT: 64 IN | SYSTOLIC BLOOD PRESSURE: 122 MMHG

## 2020-01-21 PROCEDURE — 99214 OFFICE O/P EST MOD 30 MIN: CPT | Performed by: PHYSICIAN ASSISTANT

## 2020-01-21 ASSESSMENT — ENCOUNTER SYMPTOMS
PHOTOPHOBIA: 1
VOMITING: 0
NAUSEA: 1
BLURRED VISION: 0

## 2020-01-21 ASSESSMENT — PATIENT HEALTH QUESTIONNAIRE - PHQ9
SUM OF ALL RESPONSES TO PHQ QUESTIONS 1-9: 0
1. LITTLE INTEREST OR PLEASURE IN DOING THINGS: 0
SUM OF ALL RESPONSES TO PHQ9 QUESTIONS 1 & 2: 0
SUM OF ALL RESPONSES TO PHQ QUESTIONS 1-9: 0
2. FEELING DOWN, DEPRESSED OR HOPELESS: 0

## 2020-01-21 NOTE — PROGRESS NOTES
Subjective:      Patient ID: Allen Rose is a 64 y.o. female. Headache    This is a chronic problem. The current episode started more than 1 month ago. The problem has been gradually worsening. The pain quality is similar to prior headaches. Associated symptoms include nausea, numbness, phonophobia, photophobia and tingling. Pertinent negatives include no blurred vision, vomiting or weakness. The symptoms are aggravated by emotional stress (Patient had to move her ill parents away from their home. Patient admits to work stressors. ). She has tried Excedrin and oral narcotics for the symptoms. Her past medical history is significant for migraines in the family. Hand Pain    The incident occurred more than 1 week ago. The injury mechanism was repetitive motion. The pain is present in the left hand and right hand. The quality of the pain is described as aching. Associated symptoms include muscle weakness, numbness and tingling. Review of Systems   Eyes: Positive for photophobia. Negative for blurred vision. Gastrointestinal: Positive for nausea. Negative for vomiting. Neurological: Positive for tingling, numbness and headaches. Negative for weakness.      Past Medical History:   Diagnosis Date    Anxiety     Cerebrovascular disease     Mini stroke in 2006    CVA (cerebral infarction)     Facial weakness     left    Family history of colon cancer     Gastroesophageal reflux disease without esophagitis 5/31/2016    History of TIAs     Hypoglycemia     [facility]is [facility] [address]     Memory problem     Migraine     Sleep difficulties     Speech abnormality     Tobacco abuse     Unspecified sleep apnea      Past Surgical History:   Procedure Laterality Date    APPENDECTOMY      CARDIAC CATHETERIZATION  7-2-15    nml    CHOLECYSTECTOMY      COLONOSCOPY  4/29/2015    2 polyps, sigmoid diverticulosis    FOOT SURGERY Right     HYSTERECTOMY      OVARIAN CYST SURGERY      TONSILLECTOMY Social History     Tobacco Use    Smoking status: Current Every Day Smoker     Packs/day: 0.50     Years: 20.00     Pack years: 10.00    Smokeless tobacco: Never Used   Substance Use Topics    Alcohol use: No     Alcohol/week: 0.0 standard drinks    Drug use: No       Objective:   Physical Exam  HENT:      Head: Normocephalic. Right Ear: Tympanic membrane normal.      Left Ear: Tympanic membrane normal.   Cardiovascular:      Rate and Rhythm: Normal rate and regular rhythm. Pulmonary:      Effort: Pulmonary effort is normal.      Breath sounds: Normal breath sounds. Musculoskeletal:      Comments: + Tinel bilaterally   Skin:     General: Skin is warm. Neurological:      General: No focal deficit present. Mental Status: She is alert. Psychiatric:         Mood and Affect: Mood normal.         Behavior: Behavior normal.         Assessment:      1. Chronic nonintractable headache, unspecified headache type    2. Bilateral hand pain    3. Bilateral carpal tunnel syndrome    4. Screening mammogram, encounter for          Plan:      Update laboratory studies. Stress reduction discussed. Healthy diet and routine exercise. Patient has completed EMG in the past and declined surgery. HEP recommended. Wrist cock-up splints at bedtime. Schedule mammogram.  Follow-up as planned for management of chronic medical conditions/sooner PRN.         RUSSELL Hooks

## 2020-01-22 LAB
ALBUMIN SERPL-MCNC: 4.4 G/DL
ALP BLD-CCNC: 56 U/L
ALT SERPL-CCNC: 16 U/L
ANION GAP SERPL CALCULATED.3IONS-SCNC: NORMAL MMOL/L
AST SERPL-CCNC: 14 U/L
BASOPHILS ABSOLUTE: 0.06 /ΜL
BASOPHILS RELATIVE PERCENT: 1 %
BILIRUB SERPL-MCNC: 0.3 MG/DL (ref 0.1–1.4)
BUN BLDV-MCNC: 14 MG/DL
CALCIUM SERPL-MCNC: 9.5 MG/DL
CHLORIDE BLD-SCNC: 104 MMOL/L
CO2: 26 MMOL/L
CREAT SERPL-MCNC: 0.65 MG/DL
EOSINOPHILS ABSOLUTE: 0.11 /ΜL
EOSINOPHILS RELATIVE PERCENT: 2 %
FOLATE: NORMAL
GFR CALCULATED: >60
GLUCOSE BLD-MCNC: 82 MG/DL
HCT VFR BLD CALC: 43 % (ref 36–46)
HEMOGLOBIN: 13.8 G/DL (ref 12–16)
LYMPHOCYTES ABSOLUTE: 2.06 /ΜL
LYMPHOCYTES RELATIVE PERCENT: 28 %
MCH RBC QN AUTO: 30.2 PG
MCHC RBC AUTO-ENTMCNC: 32.1 G/DL
MCV RBC AUTO: 94.1 FL
MONOCYTES ABSOLUTE: 0.42 /ΜL
MONOCYTES RELATIVE PERCENT: 6 %
NEUTROPHILS ABSOLUTE: 4.6 /ΜL
NEUTROPHILS RELATIVE PERCENT: 63 %
PDW BLD-RTO: NORMAL %
PLATELET # BLD: 314 K/ΜL
PMV BLD AUTO: 9.6 FL
POTASSIUM SERPL-SCNC: 4.3 MMOL/L
RBC # BLD: 4.57 10^6/ΜL
RHEUMATOID FACTOR: <10
SODIUM BLD-SCNC: 144 MMOL/L
TOTAL PROTEIN: 6.5
TSH SERPL DL<=0.05 MIU/L-ACNC: 0.99 UIU/ML
VITAMIN B-12: 724
VITAMIN D 25-HYDROXY: 77.1
VITAMIN D2, 25 HYDROXY: NORMAL
VITAMIN D3,25 HYDROXY: NORMAL
WBC # BLD: 7.3 10^3/ML

## 2020-01-31 ENCOUNTER — TELEPHONE (OUTPATIENT)
Dept: FAMILY MEDICINE CLINIC | Age: 60
End: 2020-01-31

## 2020-03-09 ENCOUNTER — OFFICE VISIT (OUTPATIENT)
Dept: OTOLARYNGOLOGY | Age: 60
End: 2020-03-09
Payer: COMMERCIAL

## 2020-03-09 VITALS
SYSTOLIC BLOOD PRESSURE: 128 MMHG | DIASTOLIC BLOOD PRESSURE: 72 MMHG | HEART RATE: 72 BPM | BODY MASS INDEX: 27.66 KG/M2 | RESPIRATION RATE: 14 BRPM | HEIGHT: 64 IN | WEIGHT: 162 LBS

## 2020-03-09 PROCEDURE — 99213 OFFICE O/P EST LOW 20 MIN: CPT | Performed by: OTOLARYNGOLOGY

## 2020-03-09 RX ORDER — METHYLPREDNISOLONE 4 MG/1
TABLET ORAL
Qty: 1 KIT | Refills: 0 | Status: SHIPPED | OUTPATIENT
Start: 2020-03-09 | End: 2020-06-17

## 2020-03-09 NOTE — PROGRESS NOTES
Rosa Maria Guevara  3/9/20  /72   Pulse 72   Resp 14   Ht 5' 4\" (1.626 m)   Wt 162 lb (73.5 kg)   BMI 27.81 kg/m²   Allergies as of 03/09/2020 - Review Complete 03/09/2020   Allergen Reaction Noted    Tape [adhesive tape] Rash 07/22/2014     Chief Complaint   Patient presents with    Pain     left  ear pain       HPI:  seen last July for L ear pain, IDL nl then, now CO AS pain x 3 days, hearing ok    Past Med Hx:     Past Medical History:   Diagnosis Date    Anxiety     Cerebrovascular disease     Mini stroke in 2006    CVA (cerebral infarction)     Facial weakness     left    Family history of colon cancer     Gastroesophageal reflux disease without esophagitis 5/31/2016    History of TIAs     Hypoglycemia     Left hemiparesis (HCC)     Memory problem     Migraine     Sleep difficulties     Speech abnormality     Tobacco abuse     Unspecified sleep apnea         Past Surgical History:   Procedure Laterality Date    APPENDECTOMY      CARDIAC CATHETERIZATION  7-2-15    nml    CHOLECYSTECTOMY      COLONOSCOPY  4/29/2015    2 polyps, sigmoid diverticulosis    FOOT SURGERY Right     HYSTERECTOMY      OVARIAN CYST SURGERY      TONSILLECTOMY         Family History:     Family History   Problem Relation Age of Onset    Diabetes Mother     Heart Disease Father         afib    High Blood Pressure Father     Diabetes Maternal Grandmother     Cancer Maternal Grandmother         breast    Cancer Maternal Grandfather         brain    Heart Disease Paternal Grandfather     Diabetes Sister     Thyroid Disease Sister     Mental Retardation Sister     Cancer Brother         colon, prostate, lung    Diabetes Brother     Diabetes Brother     Other Brother         hyperglycemia    Heart Disease Brother         atrial fibrillation    Other Other         Jack Syndrome/Jack Syndrome    Other Grandchild         2nd Grandson with Hunters Syndrome       Social Hx:     Social History

## 2020-03-10 ENCOUNTER — TELEPHONE (OUTPATIENT)
Dept: FAMILY MEDICINE CLINIC | Age: 60
End: 2020-03-10

## 2020-03-10 NOTE — LETTER
Michael Wheeler A department of [place] [address]  Phone: [phone]  Fax: [address] [place], [place]        March 10, 2020    [address] [place] [address]      Dear Antionette Kim: This is to inform you that you are due to schedule your Mammogram    If you have any questions or concerns, please don't hesitate to call.     Sincerely,        RUSSELL Reynoso/Leisa

## 2020-04-20 RX ORDER — CLOPIDOGREL BISULFATE 75 MG/1
75 TABLET ORAL DAILY
Qty: 15 TABLET | Refills: 0 | Status: SHIPPED | OUTPATIENT
Start: 2020-04-20 | End: 2020-06-17

## 2020-04-20 RX ORDER — PANTOPRAZOLE SODIUM 40 MG/1
40 TABLET, DELAYED RELEASE ORAL DAILY
Qty: 90 TABLET | Refills: 0 | Status: SHIPPED | OUTPATIENT
Start: 2020-04-20 | End: 2020-06-29

## 2020-04-20 RX ORDER — CLOPIDOGREL BISULFATE 75 MG/1
75 TABLET ORAL DAILY
Qty: 90 TABLET | Refills: 0 | Status: SHIPPED | OUTPATIENT
Start: 2020-04-20 | End: 2020-07-20

## 2020-05-26 RX ORDER — CETIRIZINE HYDROCHLORIDE 10 MG/1
10 TABLET ORAL DAILY
Qty: 90 TABLET | Refills: 1 | Status: SHIPPED | OUTPATIENT
Start: 2020-05-26 | End: 2020-07-25 | Stop reason: SDUPTHER

## 2020-05-26 RX ORDER — CETIRIZINE HYDROCHLORIDE 10 MG/1
10 TABLET ORAL DAILY
Qty: 10 TABLET | Refills: 0 | Status: SHIPPED | OUTPATIENT
Start: 2020-05-26 | End: 2020-06-05

## 2020-06-15 ENCOUNTER — HOSPITAL ENCOUNTER (OUTPATIENT)
Dept: MAMMOGRAPHY | Age: 60
Discharge: HOME | End: 2020-06-17
Payer: COMMERCIAL

## 2020-06-15 ENCOUNTER — TELEPHONE (OUTPATIENT)
Dept: FAMILY MEDICINE CLINIC | Age: 60
End: 2020-06-15

## 2020-06-15 PROCEDURE — 77063 BREAST TOMOSYNTHESIS BI: CPT

## 2020-06-16 ENCOUNTER — TELEPHONE (OUTPATIENT)
Dept: FAMILY MEDICINE CLINIC | Age: 60
End: 2020-06-16

## 2020-06-16 NOTE — TELEPHONE ENCOUNTER
----- Message from [place], [place] sent at 6/15/2020  5:04 PM EDT -----  Normal mammogram.  Continue annual mammogram and monthly SBE.

## 2020-06-17 ENCOUNTER — OFFICE VISIT (OUTPATIENT)
Dept: FAMILY MEDICINE CLINIC | Age: 60
End: 2020-06-17
Payer: COMMERCIAL

## 2020-06-17 VITALS
WEIGHT: 178 LBS | BODY MASS INDEX: 30.39 KG/M2 | SYSTOLIC BLOOD PRESSURE: 100 MMHG | DIASTOLIC BLOOD PRESSURE: 60 MMHG | HEIGHT: 64 IN | HEART RATE: 72 BPM

## 2020-06-17 PROCEDURE — 99213 OFFICE O/P EST LOW 20 MIN: CPT | Performed by: PHYSICIAN ASSISTANT

## 2020-06-17 RX ORDER — FLUTICASONE PROPIONATE 50 MCG
2 SPRAY, SUSPENSION (ML) NASAL DAILY
Qty: 1 BOTTLE | Refills: 5 | Status: SHIPPED | OUTPATIENT
Start: 2020-06-17 | End: 2020-07-25 | Stop reason: SDUPTHER

## 2020-06-17 RX ORDER — CELECOXIB 200 MG/1
200 CAPSULE ORAL DAILY
Qty: 30 CAPSULE | Refills: 5 | Status: SHIPPED | OUTPATIENT
Start: 2020-06-17 | End: 2020-10-28 | Stop reason: SDUPTHER

## 2020-06-17 ASSESSMENT — ENCOUNTER SYMPTOMS: RESPIRATORY NEGATIVE: 1

## 2020-06-17 ASSESSMENT — PATIENT HEALTH QUESTIONNAIRE - PHQ9
SUM OF ALL RESPONSES TO PHQ QUESTIONS 1-9: 0
2. FEELING DOWN, DEPRESSED OR HOPELESS: 0
1. LITTLE INTEREST OR PLEASURE IN DOING THINGS: 0
SUM OF ALL RESPONSES TO PHQ9 QUESTIONS 1 & 2: 0
SUM OF ALL RESPONSES TO PHQ QUESTIONS 1-9: 0

## 2020-06-29 RX ORDER — PANTOPRAZOLE SODIUM 40 MG/1
TABLET, DELAYED RELEASE ORAL
Qty: 90 TABLET | Refills: 1 | Status: SHIPPED | OUTPATIENT
Start: 2020-06-29 | End: 2021-01-06 | Stop reason: SDUPTHER

## 2020-07-20 RX ORDER — CLOPIDOGREL BISULFATE 75 MG/1
TABLET ORAL
Qty: 90 TABLET | Refills: 0 | Status: SHIPPED | OUTPATIENT
Start: 2020-07-20 | End: 2020-10-16

## 2020-07-20 NOTE — TELEPHONE ENCOUNTER
Last Appt:  6/17/2020  Next Appt:   Visit date not found  Med verified in Epic  Will need appointment

## 2020-07-25 ENCOUNTER — OFFICE VISIT (OUTPATIENT)
Dept: PRIMARY CARE CLINIC | Age: 60
End: 2020-07-25
Payer: COMMERCIAL

## 2020-07-25 VITALS
OXYGEN SATURATION: 98 % | DIASTOLIC BLOOD PRESSURE: 82 MMHG | BODY MASS INDEX: 30.9 KG/M2 | TEMPERATURE: 98 F | WEIGHT: 180 LBS | SYSTOLIC BLOOD PRESSURE: 120 MMHG | HEART RATE: 74 BPM

## 2020-07-25 PROCEDURE — 99213 OFFICE O/P EST LOW 20 MIN: CPT | Performed by: PHYSICIAN ASSISTANT

## 2020-07-25 RX ORDER — CETIRIZINE HYDROCHLORIDE 10 MG/1
10 TABLET ORAL DAILY
Qty: 90 TABLET | Refills: 1 | Status: SHIPPED | OUTPATIENT
Start: 2020-07-25 | End: 2021-05-26

## 2020-07-25 RX ORDER — FLUTICASONE PROPIONATE 50 MCG
2 SPRAY, SUSPENSION (ML) NASAL DAILY
Qty: 3 BOTTLE | Refills: 1 | Status: SHIPPED | OUTPATIENT
Start: 2020-07-25 | End: 2021-05-26

## 2020-07-25 RX ORDER — TRIAMCINOLONE ACETONIDE 1 MG/G
CREAM TOPICAL 3 TIMES DAILY
Qty: 80 G | Refills: 0 | Status: SHIPPED | OUTPATIENT
Start: 2020-07-25 | End: 2021-05-26

## 2020-07-25 ASSESSMENT — PATIENT HEALTH QUESTIONNAIRE - PHQ9
SUM OF ALL RESPONSES TO PHQ QUESTIONS 1-9: 0
1. LITTLE INTEREST OR PLEASURE IN DOING THINGS: 0
2. FEELING DOWN, DEPRESSED OR HOPELESS: 0
SUM OF ALL RESPONSES TO PHQ9 QUESTIONS 1 & 2: 0
SUM OF ALL RESPONSES TO PHQ QUESTIONS 1-9: 0

## 2020-07-25 ASSESSMENT — ENCOUNTER SYMPTOMS: RESPIRATORY NEGATIVE: 1

## 2020-07-25 NOTE — PROGRESS NOTES
Subjective:      Patient ID: Nathaniel Damon is a 64 y.o. female. Rash   This is a new problem. The current episode started in the past 7 days. The affected locations include the right hand and left hand. The rash is characterized by burning. She was exposed to nothing. Treatments tried: antifungal cream. The treatment provided no relief. Review of Systems   Constitutional: Negative. Respiratory: Negative. Cardiovascular: Negative. Skin: Positive for rash. Objective:   Physical Exam  Cardiovascular:      Rate and Rhythm: Normal rate. Pulmonary:      Effort: Pulmonary effort is normal.      Breath sounds: Normal breath sounds. Skin:     General: Skin is warm. Comments: Eczematous lesions bilateral hands, dyshidrotic eczema appearing lesions on palms bilaterally   Neurological:      General: No focal deficit present. Mental Status: She is alert and oriented to person, place, and time. Psychiatric:         Mood and Affect: Mood normal.         Assessment:      1. Dyshidrotic eczema    2. Seasonal allergies          Plan:      Topical Kenalog cream.  Good moisturization to skin. Gentle cleansers. Supportive care. Follow-up as planned/sooner PRN.         RUSSELL Jefferson

## 2020-08-10 LAB — URIC ACID: 2.7 MG/DL (ref 2.6–6)

## 2020-08-31 ENCOUNTER — TELEPHONE (OUTPATIENT)
Dept: FAMILY MEDICINE CLINIC | Age: 60
End: 2020-08-31

## 2020-10-16 RX ORDER — CLOPIDOGREL BISULFATE 75 MG/1
TABLET ORAL
Qty: 90 TABLET | Refills: 0 | Status: SHIPPED | OUTPATIENT
Start: 2020-10-16 | End: 2021-01-06 | Stop reason: SDUPTHER

## 2020-10-16 RX ORDER — CLOPIDOGREL BISULFATE 75 MG/1
75 TABLET ORAL DAILY
Qty: 10 TABLET | Refills: 0 | Status: SHIPPED | OUTPATIENT
Start: 2020-10-16 | End: 2020-10-28 | Stop reason: SDUPTHER

## 2020-10-16 RX ORDER — CLOPIDOGREL BISULFATE 75 MG/1
TABLET ORAL
Status: CANCELLED | OUTPATIENT
Start: 2020-10-16 | End: 2020-10-26

## 2020-10-16 NOTE — TELEPHONE ENCOUNTER
Last Appt:  6/17/2020  Next Appt:   Visit date not found  Med verified in [facility] [address]  Will need to call for appt

## 2020-10-16 NOTE — TELEPHONE ENCOUNTER
Last Appt:  6/17/2020  Next Appt:   10/28/2020  Med verified in [facility]  nedds 10 day supp;y

## 2020-10-16 NOTE — TELEPHONE ENCOUNTER
Pt calling stating she booked an appt but thinks she needs labs, if so please print and put in UC for pt to .

## 2020-10-26 ENCOUNTER — HOSPITAL ENCOUNTER (OUTPATIENT)
Dept: LAB | Age: 60
Discharge: HOME | End: 2020-10-26
Payer: COMMERCIAL

## 2020-10-26 LAB
ALBUMIN SERPL-MCNC: 4.4 G/DL (ref 3.5–5.2)
ALBUMIN/GLOBULIN RATIO: 1.8 (ref 1–2.5)
ALP BLD-CCNC: 61 U/L (ref 35–104)
ALT SERPL-CCNC: 13 U/L (ref 5–33)
ANION GAP SERPL CALCULATED.3IONS-SCNC: 9 MMOL/L (ref 9–17)
AST SERPL-CCNC: 13 U/L
BILIRUB SERPL-MCNC: 0.29 MG/DL (ref 0.3–1.2)
BUN BLDV-MCNC: 16 MG/DL (ref 6–20)
BUN/CREAT BLD: 24 (ref 9–20)
CALCIUM SERPL-MCNC: 9.4 MG/DL (ref 8.6–10.4)
CHLORIDE BLD-SCNC: 106 MMOL/L (ref 98–107)
CHOLESTEROL/HDL RATIO: 3.2
CHOLESTEROL: 177 MG/DL
CO2: 28 MMOL/L (ref 20–31)
CREAT SERPL-MCNC: 0.68 MG/DL (ref 0.5–0.9)
ESTIMATED AVERAGE GLUCOSE: 111 MG/DL
GFR AFRICAN AMERICAN: >60 ML/MIN
GFR NON-AFRICAN AMERICAN: >60 ML/MIN
GFR SERPL CREATININE-BSD FRML MDRD: ABNORMAL ML/MIN/{1.73_M2}
GFR SERPL CREATININE-BSD FRML MDRD: ABNORMAL ML/MIN/{1.73_M2}
GLUCOSE BLD-MCNC: 99 MG/DL (ref 70–99)
HBA1C MFR BLD: 5.5 % (ref 4.8–5.9)
HDLC SERPL-MCNC: 55 MG/DL
LDL CHOLESTEROL: 104 MG/DL (ref 0–130)
POTASSIUM SERPL-SCNC: 4.4 MMOL/L (ref 3.7–5.3)
SODIUM BLD-SCNC: 143 MMOL/L (ref 135–144)
TOTAL PROTEIN: 6.9 G/DL (ref 6.4–8.3)
TRIGL SERPL-MCNC: 89 MG/DL
VLDLC SERPL CALC-MCNC: NORMAL MG/DL (ref 1–30)

## 2020-10-26 PROCEDURE — 36415 COLL VENOUS BLD VENIPUNCTURE: CPT

## 2020-10-26 PROCEDURE — 80053 COMPREHEN METABOLIC PANEL: CPT

## 2020-10-26 PROCEDURE — 80061 LIPID PANEL: CPT

## 2020-10-26 PROCEDURE — 83036 HEMOGLOBIN GLYCOSYLATED A1C: CPT

## 2020-10-28 ENCOUNTER — OFFICE VISIT (OUTPATIENT)
Dept: FAMILY MEDICINE CLINIC | Age: 60
End: 2020-10-28
Payer: COMMERCIAL

## 2020-10-28 VITALS
HEIGHT: 64 IN | BODY MASS INDEX: 30.73 KG/M2 | DIASTOLIC BLOOD PRESSURE: 80 MMHG | SYSTOLIC BLOOD PRESSURE: 120 MMHG | WEIGHT: 180 LBS

## 2020-10-28 PROCEDURE — 99214 OFFICE O/P EST MOD 30 MIN: CPT | Performed by: PHYSICIAN ASSISTANT

## 2020-10-28 RX ORDER — SOLIFENACIN SUCCINATE 5 MG/1
5 TABLET, FILM COATED ORAL DAILY
Qty: 30 TABLET | Refills: 5 | Status: SHIPPED | OUTPATIENT
Start: 2020-10-28 | End: 2021-01-06 | Stop reason: SDUPTHER

## 2020-10-28 RX ORDER — ZOSTER VACCINE RECOMBINANT, ADJUVANTED 50 MCG/0.5
0.5 KIT INTRAMUSCULAR SEE ADMIN INSTRUCTIONS
Qty: 0.5 ML | Refills: 1 | Status: SHIPPED | OUTPATIENT
Start: 2020-10-28 | End: 2021-04-26

## 2020-10-28 RX ORDER — CELECOXIB 200 MG/1
200 CAPSULE ORAL DAILY
Qty: 90 CAPSULE | Refills: 3 | Status: SHIPPED | OUTPATIENT
Start: 2020-10-28 | End: 2021-12-02 | Stop reason: SDUPTHER

## 2020-10-28 ASSESSMENT — PATIENT HEALTH QUESTIONNAIRE - PHQ9
SUM OF ALL RESPONSES TO PHQ QUESTIONS 1-9: 0
SUM OF ALL RESPONSES TO PHQ9 QUESTIONS 1 & 2: 0
SUM OF ALL RESPONSES TO PHQ QUESTIONS 1-9: 0
1. LITTLE INTEREST OR PLEASURE IN DOING THINGS: 0
2. FEELING DOWN, DEPRESSED OR HOPELESS: 0
SUM OF ALL RESPONSES TO PHQ QUESTIONS 1-9: 0

## 2020-11-01 NOTE — PROGRESS NOTES
CHOLECYSTECTOMY      COLONOSCOPY  4/29/2015    2 polyps, sigmoid diverticulosis    FOOT SURGERY Right     HYSTERECTOMY      OVARIAN CYST SURGERY      TONSILLECTOMY       Social History     Tobacco Use    Smoking status: Current Every Day Smoker     Packs/day: 0.50     Years: 20.00     Pack years: 10.00    Smokeless tobacco: Never Used   Substance Use Topics    Alcohol use: No     Alcohol/week: 0.0 standard drinks    Drug use: No       Objective:   Physical Exam  HENT:      Head: Normocephalic. Right Ear: Tympanic membrane normal.      Left Ear: Tympanic membrane normal.      Mouth/Throat:      Mouth: Mucous membranes are moist.   Eyes:      Pupils: Pupils are equal, round, and reactive to light. Neck:      Musculoskeletal: Neck supple. Cardiovascular:      Rate and Rhythm: Normal rate and regular rhythm. Pulmonary:      Effort: Pulmonary effort is normal.      Breath sounds: Normal breath sounds. Abdominal:      General: Abdomen is flat. Neurological:      General: No focal deficit present. Mental Status: She is alert and oriented to person, place, and time. Cranial Nerves: No cranial nerve deficit.    Psychiatric:         Mood and Affect: Mood normal.         Behavior: Behavior normal.         Hospital Outpatient Visit on 10/26/2020   Component Date Value Ref Range Status    Hemoglobin A1C 10/26/2020 5.5  4.8 - 5.9 % Final    Estimated Avg Glucose 10/26/2020 111  mg/dL Final    Cholesterol 10/26/2020 177  <200 mg/dL Final    Comment:    Cholesterol Guidelines:      <200  Desirable   200-240  Borderline      >240  Undesirable         HDL 10/26/2020 55  >40 mg/dL Final    Comment:    HDL Guidelines:    <40     Undesirable   40-59    Borderline    >59     Desirable         LDL Cholesterol 10/26/2020 104  0 - 130 mg/dL Final    Comment:    LDL Guidelines:     <100    Desirable   100-129   Near to/above Desirable   130-159   Borderline      >159   Undesirable     Direct (measured) LDL and calculated LDL are not interchangeable tests.  Chol/HDL Ratio 10/26/2020 3.2  <5 Final            Triglycerides 10/26/2020 89  <150 mg/dL Final    Comment:    Triglyceride Guidelines:     <150   Desirable   150-199  Borderline   200-499  High     >499   Very high   Based on AHA Guidelines for fasting triglyceride, October 2012.  VLDL 10/26/2020 NOT REPORTED  1 - 30 mg/dL Final    Glucose 10/26/2020 99  70 - 99 mg/dL Final    BUN 10/26/2020 16  6 - 20 mg/dL Final    CREATININE 10/26/2020 0.68  0.50 - 0.90 mg/dL Final    Bun/Cre Ratio 10/26/2020 24* 9 - 20 Final    Calcium 10/26/2020 9.4  8.6 - 10.4 mg/dL Final    Sodium 10/26/2020 143  135 - 144 mmol/L Final    Potassium 10/26/2020 4.4  3.7 - 5.3 mmol/L Final    Chloride 10/26/2020 106  98 - 107 mmol/L Final    CO2 10/26/2020 28  20 - 31 mmol/L Final    Anion Gap 10/26/2020 9  9 - 17 mmol/L Final    Alkaline Phosphatase 10/26/2020 61  35 - 104 U/L Final    ALT 10/26/2020 13  5 - 33 U/L Final    AST 10/26/2020 13  <32 U/L Final    Total Bilirubin 10/26/2020 0.29* 0.3 - 1.2 mg/dL Final    Total Protein 10/26/2020 6.9  6.4 - 8.3 g/dL Final    Alb 10/26/2020 4.4  3.5 - 5.2 g/dL Final    Albumin/Globulin Ratio 10/26/2020 1.8  1.0 - 2.5 Final    GFR Non- 10/26/2020 >60  >60 mL/min Final    GFR  10/26/2020 >60  >60 mL/min Final    GFR Comment 10/26/2020        Final    Comment: Average GFR for 52-63 years old:   80 mL/min/1.73sq m  Chronic Kidney Disease:   <60 mL/min/1.73sq m  Kidney failure:   <15 mL/min/1.73sq m              GFR is a calculated value that has proven clinically to  be a more effective measure of   kidney function when reported with serum creatinine.  GFR Staging 10/26/2020 NOT REPORTED   Final       Assessment:      1. Cerebrovascular accident (CVA), unspecified mechanism ([facility] [address]    2. IFG (impaired fasting glucose)    3. Overactive bladder    4.  Arthritis of both feet Plan:      Laboratory studies reviewed with patient. Chronic medical conditions stable on present therapy. Continue current medications. Healthy diet and routine exercise. Increase water consumption. Trial of Vesicare 5 mg daily. Shingrix prescription. Follow-up in six months/sooner PRN.         RUSSELL Womack

## 2021-01-06 DIAGNOSIS — K21.9 GASTROESOPHAGEAL REFLUX DISEASE WITHOUT ESOPHAGITIS: ICD-10-CM

## 2021-01-06 DIAGNOSIS — N32.81 OVERACTIVE BLADDER: ICD-10-CM

## 2021-01-06 DIAGNOSIS — I63.9 CEREBROVASCULAR ACCIDENT (CVA), UNSPECIFIED MECHANISM (HCC): ICD-10-CM

## 2021-01-06 RX ORDER — PANTOPRAZOLE SODIUM 40 MG/1
TABLET, DELAYED RELEASE ORAL
Qty: 90 TABLET | Refills: 1 | Status: SHIPPED | OUTPATIENT
Start: 2021-01-06 | End: 2021-05-10

## 2021-01-06 RX ORDER — SOLIFENACIN SUCCINATE 5 MG/1
5 TABLET, FILM COATED ORAL DAILY
Qty: 90 TABLET | Refills: 1 | Status: SHIPPED | OUTPATIENT
Start: 2021-01-06 | End: 2021-05-10

## 2021-01-06 RX ORDER — CLOPIDOGREL BISULFATE 75 MG/1
TABLET ORAL
Qty: 90 TABLET | Refills: 0 | Status: SHIPPED | OUTPATIENT
Start: 2021-01-06 | End: 2021-03-24

## 2021-01-22 ENCOUNTER — OFFICE VISIT (OUTPATIENT)
Dept: FAMILY MEDICINE CLINIC | Age: 61
End: 2021-01-22
Payer: COMMERCIAL

## 2021-01-22 VITALS
HEART RATE: 66 BPM | DIASTOLIC BLOOD PRESSURE: 70 MMHG | TEMPERATURE: 98.1 F | BODY MASS INDEX: 30.05 KG/M2 | HEIGHT: 64 IN | WEIGHT: 176 LBS | SYSTOLIC BLOOD PRESSURE: 132 MMHG | OXYGEN SATURATION: 99 %

## 2021-01-22 DIAGNOSIS — R00.2 HEART PALPITATIONS: ICD-10-CM

## 2021-01-22 DIAGNOSIS — M77.8 TENDONITIS OF WRIST, LEFT: Primary | ICD-10-CM

## 2021-01-22 PROCEDURE — 99214 OFFICE O/P EST MOD 30 MIN: CPT | Performed by: FAMILY MEDICINE

## 2021-01-22 PROCEDURE — 93000 ELECTROCARDIOGRAM COMPLETE: CPT | Performed by: FAMILY MEDICINE

## 2021-01-22 RX ORDER — PREDNISONE 20 MG/1
20 TABLET ORAL 2 TIMES DAILY
Qty: 10 TABLET | Refills: 0 | Status: SHIPPED | OUTPATIENT
Start: 2021-01-22 | End: 2021-01-27

## 2021-01-22 ASSESSMENT — PATIENT HEALTH QUESTIONNAIRE - PHQ9
SUM OF ALL RESPONSES TO PHQ9 QUESTIONS 1 & 2: 0
SUM OF ALL RESPONSES TO PHQ QUESTIONS 1-9: 0
1. LITTLE INTEREST OR PLEASURE IN DOING THINGS: 0
SUM OF ALL RESPONSES TO PHQ QUESTIONS 1-9: 0

## 2021-01-22 ASSESSMENT — ENCOUNTER SYMPTOMS
CHEST TIGHTNESS: 0
COUGH: 0
SHORTNESS OF BREATH: 0
WHEEZING: 0

## 2021-01-22 NOTE — PROGRESS NOTES
[address] [address]  [address] [address]  Dept: [phone]  Dept Fax: [phone]  Loc: [phone]    Herberth Gonsales a 64 y.o. female who presents today for her medical conditions/complaints as notedbelow. [facility]   Chief Complaint   Patient presents with    Hand Pain     left hand pain; no known injury, pain on closing hand, started before Xmas       HPI:     Here today hand pain and palpitations. Hand Pain   The incident occurred more than 1 week ago. The injury mechanism was repetitive motion. The pain is present in the left hand and left wrist (dorsal surface of wrist, extending up wrist, pain when grasping). The quality of the pain is described as burning and shooting. The pain radiates to the left arm. The pain is at a severity of 6/10. The pain is moderate. The pain has been intermittent (every time she tries to make fist) since the incident. Associated symptoms include numbness (in the index and middle finger) and tingling. Pertinent negatives include no chest pain. Associated symptoms comments: Ends of fingers. Exacerbated by: grasping. She has tried acetaminophen and ice (biofreeze) for the symptoms. The treatment provided mild relief. She has not been picking things up more often than normal recently. She is actually using her hands less than she used to at work. Pain shot up left arm when grabbing door knob. Currently has pain in lateral aspect of dorsal wrist and finger numbness. Remembers light swelling right before kj that went away. Occasional tingling in both legs, since halloween. Starts at her tailbone and goes all the way down. Feels like legs are \"sleeping\". Very brief episodes.  fluticasone (FLONASE) 50 MCG/ACT nasal spray 2 sprays by Each Nostril route daily 3 Bottle 1    cetirizine (ZYRTEC) 10 MG tablet Take 1 tablet by mouth daily 90 tablet 1    zoster recombinant adjuvanted vaccine (SHINGRIX) 50 MCG/0.5ML SUSR injection Inject 0.5 mLs into the muscle See Admin Instructions 1 dose now and repeat in 2-6 months 0.5 mL 1    Cyanocobalamin (VITAMIN B 12 PO) Take by mouth      Cholecalciferol (VITAMIN D3) 5000 UNITS TABS Take 1 each by mouth daily       No current facility-administered medications for this visit. Allergies   Allergen Reactions    Tape Kit Walkern Tape] Rash       Subjective:     Review of Systems   Constitutional: Negative for activity change, appetite change, chills, fatigue and fever. Eyes: Negative for visual disturbance. Respiratory: Negative for cough, chest tightness, shortness of breath and wheezing. Cardiovascular: Positive for palpitations. Negative for chest pain and leg swelling. Musculoskeletal: Positive for arthralgias (left wrist). Neurological: Positive for tingling and numbness (in the index and middle finger). Negative for dizziness, syncope, weakness and light-headedness. Objective:      Physical Exam  Vitals signs and nursing note reviewed. Constitutional:       General: She is not in acute distress. Appearance: She is well-developed. Eyes:      Conjunctiva/sclera: Conjunctivae normal.   Neck:      Musculoskeletal: Normal range of motion and neck supple. Thyroid: No thyromegaly. Cardiovascular:      Rate and Rhythm: Normal rate and regular rhythm. Heart sounds: Normal heart sounds. No murmur. Pulmonary:      Effort: Pulmonary effort is normal. No respiratory distress. Breath sounds: Normal breath sounds. No wheezing. Musculoskeletal:      Left wrist: She exhibits tenderness. She exhibits normal range of motion, no swelling and no effusion. Left hand: She exhibits tenderness (over the flexor tendons of the thumb and index finger). She exhibits normal range of motion, no deformity and no swelling. Normal sensation noted. Normal strength noted. Comments: Pain with supination   Lymphadenopathy:      Cervical: No cervical adenopathy. Skin:     General: Skin is warm and dry. Findings: No erythema or rash. Neurological:      Mental Status: She is alert and oriented to person, place, and time. /70   Pulse 66   Temp 98.1 °F (36.7 °C)   Ht 5' 4\" (1.626 m)   Wt 176 lb (79.8 kg)   SpO2 99%   BMI 30.21 kg/m²     Assessment:       Diagnosis Orders   1. Tendonitis of wrist, left     2. Heart palpitations  EKG 12 Lead    EKG 12 Lead             Plan:        Tendonitis: new; I will treat with prednisone. She was advised to call if there is no improvement. Palpitations: new; her EKG today was WNL so I reassured her that she does not have obvious afib at this point so if things dont improve she can follow up with her pcp. Return if symptoms worsen or fail to improve. Orders Placed This Encounter   Procedures    EKG 12 Lead     Standing Status:   Future     Number of Occurrences:   1     Standing Expiration Date:   1/22/2022     Order Specific Question:   Reason for Exam?     Answer:   Irregular heart rate     Orders Placed This Encounter   Medications    predniSONE (DELTASONE) 20 MG tablet     Sig: Take 1 tablet by mouth 2 times daily for 5 days     Dispense:  10 tablet     Refill:  0       Patientgiven educational materials - see patient instructions. Discussed use, benefit,and side effects of prescribed medications. All patient questions answered. Ptvoiced understanding. Reviewed health maintenance. Instructed to continue currentmedications, diet and exercise. Patient agreed with treatment plan. Follow up asdirected.      Electronically signed by Andrew Rivas MD on 1/22/2021 at 5:49 PM

## 2021-02-06 ENCOUNTER — OFFICE VISIT (OUTPATIENT)
Dept: PRIMARY CARE CLINIC | Age: 61
End: 2021-02-06
Payer: COMMERCIAL

## 2021-02-06 ENCOUNTER — HOSPITAL ENCOUNTER (OUTPATIENT)
Age: 61
Setting detail: SPECIMEN
Discharge: HOME | End: 2021-02-06
Payer: COMMERCIAL

## 2021-02-06 VITALS
BODY MASS INDEX: 30.56 KG/M2 | RESPIRATION RATE: 16 BRPM | SYSTOLIC BLOOD PRESSURE: 122 MMHG | WEIGHT: 179 LBS | TEMPERATURE: 97.1 F | HEART RATE: 73 BPM | DIASTOLIC BLOOD PRESSURE: 70 MMHG | HEIGHT: 64 IN | OXYGEN SATURATION: 98 %

## 2021-02-06 DIAGNOSIS — B34.9 VIRAL SYNDROME: ICD-10-CM

## 2021-02-06 DIAGNOSIS — B34.9 VIRAL SYNDROME: Primary | ICD-10-CM

## 2021-02-06 DIAGNOSIS — J02.9 PHARYNGITIS, UNSPECIFIED ETIOLOGY: ICD-10-CM

## 2021-02-06 PROCEDURE — 99213 OFFICE O/P EST LOW 20 MIN: CPT | Performed by: FAMILY MEDICINE

## 2021-02-06 PROCEDURE — U0003 INFECTIOUS AGENT DETECTION BY NUCLEIC ACID (DNA OR RNA); SEVERE ACUTE RESPIRATORY SYNDROME CORONAVIRUS 2 (SARS-COV-2) (CORONAVIRUS DISEASE [COVID-19]), AMPLIFIED PROBE TECHNIQUE, MAKING USE OF HIGH THROUGHPUT TECHNOLOGIES AS DESCRIBED BY CMS-2020-01-R: HCPCS

## 2021-02-06 PROCEDURE — U0005 INFEC AGEN DETEC AMPLI PROBE: HCPCS

## 2021-02-06 RX ORDER — AMOXICILLIN AND CLAVULANATE POTASSIUM 500; 125 MG/1; MG/1
1 TABLET, FILM COATED ORAL 2 TIMES DAILY
Qty: 20 TABLET | Refills: 0 | Status: SHIPPED | OUTPATIENT
Start: 2021-02-06 | End: 2021-02-16

## 2021-02-06 ASSESSMENT — ENCOUNTER SYMPTOMS
ABDOMINAL PAIN: 0
SHORTNESS OF BREATH: 0
SINUS PAIN: 1
EYE REDNESS: 0
NAUSEA: 1
COUGH: 1
EYE DISCHARGE: 0
DIARRHEA: 0
TROUBLE SWALLOWING: 0
WHEEZING: 0
CONSTIPATION: 0
VOMITING: 1
SORE THROAT: 1
SWOLLEN GLANDS: 1
RHINORRHEA: 1
SINUS PRESSURE: 1

## 2021-02-06 NOTE — PATIENT INSTRUCTIONS
Mild cases of COVID-19 can be treated at home. Serious cases need treatment in the hospital. Treatment may include medicines to reduce symptoms, plus breathing support such as oxygen therapy or a ventilator. Some people may be placed on their belly to help their oxygen levels. Treatments that may help people who have COVID-19 include:  Antiviral medicines. These medicines treat viral infections. Remdesivir is an example. Immune-based therapy. These medicines help the immune system fight COVID-19. One example is bamlanivimab. It's a monoclonal antibody. Blood thinners. These medicines help prevent blood clots. People with severe illness are at risk for blood clots. How can you protect yourself and others? The best way to protect yourself from getting sick is to:  · Avoid areas where there is an outbreak. · Avoid contact with people who may be infected. · Avoid crowds and try to stay at least 6 feet away from other people. · Wash your hands often, especially after you cough or sneeze. Use soap and water, and scrub for at least 20 seconds. If soap and water aren't available, use an alcohol-based hand . · Avoid touching your mouth, nose, and eyes. To help avoid spreading the virus to others:  · Stay home if you are sick or have been exposed to the virus. Don't go to school, work, or public areas. And don't use public transportation, ride-shares, or taxis unless you have no choice. · Wear a cloth face cover if you have to go to public areas. · Cover your mouth with a tissue when you cough or sneeze. Then throw the tissue in the trash and wash your hands right away. · If you're sick:  ? Leave your home only if you need to get medical care. But call the doctor's office first so they know you're coming. And wear a face cover. ? Wear the face cover whenever you're around other people. It can help stop the spread of the virus when you cough or sneeze. ? Limit contact with pets and people in your home. If possible, stay in a separate bedroom and use a separate bathroom. ? Clean and disinfect your home every day. Use household  and disinfectant wipes or sprays. Take special care to clean things that you grab with your hands. These include doorknobs, remote controls, phones, and handles on your refrigerator and microwave. And don't forget countertops, tabletops, bathrooms, and computer keyboards. When should you call for help? Call 911 anytime you think you may need emergency care. For example, call if you have life-threatening symptoms, such as:    · You have severe trouble breathing. (You can't talk at all.)     · You have constant chest pain or pressure.     · You are severely dizzy or lightheaded.     · You are confused or can't think clearly.     · Your face and lips have a blue color.     · You pass out (lose consciousness) or are very hard to wake up. Call your doctor now or seek immediate medical care if:    · You have moderate trouble breathing. (You can't speak a full sentence.)     · You are coughing up blood (more than about 1 teaspoon).     · You have signs of low blood pressure. These include feeling lightheaded; being too weak to stand; and having cold, pale, clammy skin. Watch closely for changes in your health, and be sure to contact your doctor if:    · Your symptoms get worse.     · You are not getting better as expected. Call before you go to the doctor's office. Follow their instructions. And wear a cloth face cover. Current as of: December 18, 2020               Content Version: 12.7  © 2006-2021 [employer]. Care instructions adapted under license by [facility] ([facility]). If you have questions about a medical condition or this instruction, always ask your healthcare professional. [address] any warranty or liability for your use of this information.

## 2021-02-06 NOTE — PROGRESS NOTES
2021     Kasia Agarwal (:  1960) is a 64 y.o. female, here for evaluation of the following medical concerns:    Pharyngitis  This is a new problem. The current episode started in the past 7 days (3 days duration). The problem occurs constantly. The problem has been gradually worsening. Associated symptoms include congestion, coughing (did cough yesterday with some blood in sputum, believes from irritation in throat), fatigue, headaches, nausea, a sore throat, swollen glands and vomiting (1 episode on Thursday). Pertinent negatives include no abdominal pain, arthralgias, chest pain, chills, fever, myalgias, neck pain, rash or weakness. Associated symptoms comments: 15 people at [facility] in her department who are out waiting on test results for COVID. No loss of taste or smell. . She has tried acetaminophen (Excederine, pain pill (husbands)) for the symptoms. The treatment provided mild relief. Did review patient's med list, allergies, social history,pmhx and pshx today as noted in the record. Review of Systems   Constitutional: Positive for fatigue. Negative for chills and fever. HENT: Positive for congestion, ear pain, postnasal drip, rhinorrhea, sinus pressure, sinus pain and sore throat. Negative for trouble swallowing. Eyes: Negative for discharge and redness. Respiratory: Positive for cough (did cough yesterday with some blood in sputum, believes from irritation in throat). Negative for shortness of breath and wheezing. Cardiovascular: Negative for chest pain. Gastrointestinal: Positive for nausea and vomiting (1 episode on Thursday). Negative for abdominal pain, constipation and diarrhea. Genitourinary: Negative for dysuria, flank pain, frequency and urgency. Musculoskeletal: Negative for arthralgias, myalgias and neck pain. Skin: Negative for rash and wound. Allergic/Immunologic: Negative for environmental allergies. Estimated body mass index is 30.73 kg/m² as calculated from the following:    Height as of this encounter: 5' 4\" (1.626 m). Weight as of this encounter: 179 lb (81.2 kg). Physical Exam  Vitals signs and nursing note reviewed. Constitutional:       General: She is not in acute distress. Appearance: Normal appearance. She is well-developed. She is not diaphoretic. HENT:      Head: Normocephalic and atraumatic. Right Ear: External ear normal.      Left Ear: External ear normal.      Ears:      Comments: TMs dull with fluid behind the TM     Nose: Congestion and rhinorrhea present. Mouth/Throat:      Pharynx: Posterior oropharyngeal erythema present. Comments: Post nasal drainage noted  Eyes:      General: No scleral icterus. Right eye: No discharge. Left eye: No discharge. Conjunctiva/sclera: Conjunctivae normal.      Pupils: Pupils are equal, round, and reactive to light. Neck:      Musculoskeletal: Normal range of motion and neck supple. Thyroid: No thyromegaly. Cardiovascular:      Rate and Rhythm: Normal rate and regular rhythm. Heart sounds: Normal heart sounds. Pulmonary:      Effort: Pulmonary effort is normal. No respiratory distress. Breath sounds: Normal breath sounds. No wheezing. Lymphadenopathy:      Cervical: Cervical adenopathy present. Skin:     General: Skin is warm and dry. Findings: No rash. Neurological:      Mental Status: She is alert and oriented to person, place, and time. Psychiatric:         Behavior: Behavior normal.         Thought Content: Thought content normal.         Judgment: Judgment normal.         ASSESSMENT/PLAN:  Encounter Diagnoses   Name Primary?     Viral syndrome Yes    Pharyngitis, unspecified etiology      Orders Placed This Encounter   Medications    amoxicillin-clavulanate (AUGMENTIN) 500-125 MG per tablet     Sig: Take 1 tablet by mouth 2 times daily for 10 days     Dispense:  20 tablet

## 2021-02-08 LAB
SARS-COV-2, RAPID: NORMAL
SARS-COV-2: NORMAL
SARS-COV-2: NOT DETECTED
SOURCE: NORMAL

## 2021-03-12 ENCOUNTER — TELEPHONE (OUTPATIENT)
Dept: FAMILY MEDICINE CLINIC | Age: 61
End: 2021-03-12

## 2021-03-12 NOTE — TELEPHONE ENCOUNTER
Pt calling stating her allergies are really bad with this up and down weather, was given Zyrtec but states it's not doing anything, pt states it's in her eyes and nose, she is very congested, questions if there is anything else you can recommend, pt uses loaded pharmacy, please advise at above number.

## 2021-03-24 DIAGNOSIS — I63.9 CEREBROVASCULAR ACCIDENT (CVA), UNSPECIFIED MECHANISM (HCC): ICD-10-CM

## 2021-03-24 RX ORDER — CLOPIDOGREL BISULFATE 75 MG/1
TABLET ORAL
Qty: 90 TABLET | Refills: 0 | Status: SHIPPED | OUTPATIENT
Start: 2021-03-24 | End: 2021-05-10

## 2021-03-29 ENCOUNTER — OFFICE VISIT (OUTPATIENT)
Dept: PRIMARY CARE CLINIC | Age: 61
End: 2021-03-29
Payer: COMMERCIAL

## 2021-03-29 VITALS
HEIGHT: 64 IN | WEIGHT: 178.2 LBS | BODY MASS INDEX: 30.42 KG/M2 | SYSTOLIC BLOOD PRESSURE: 126 MMHG | DIASTOLIC BLOOD PRESSURE: 70 MMHG | TEMPERATURE: 97.2 F | RESPIRATION RATE: 16 BRPM | OXYGEN SATURATION: 97 % | HEART RATE: 80 BPM

## 2021-03-29 DIAGNOSIS — R21 RASH AND NONSPECIFIC SKIN ERUPTION: Primary | ICD-10-CM

## 2021-03-29 PROCEDURE — 99213 OFFICE O/P EST LOW 20 MIN: CPT | Performed by: NURSE PRACTITIONER

## 2021-03-29 RX ORDER — PREDNISONE 20 MG/1
TABLET ORAL
Qty: 15 TABLET | Refills: 0 | Status: SHIPPED | OUTPATIENT
Start: 2021-03-29 | End: 2021-05-26

## 2021-03-29 ASSESSMENT — ENCOUNTER SYMPTOMS
SHORTNESS OF BREATH: 0
GASTROINTESTINAL NEGATIVE: 1
DIARRHEA: 0
VOMITING: 0
RESPIRATORY NEGATIVE: 1
SORE THROAT: 0
COUGH: 0
RHINORRHEA: 0

## 2021-03-29 NOTE — PROGRESS NOTES
[facility]             [address], [address]                        Telephone [phone]             Fax [phone]     Carole Corey  1960  DFY:[id number]   Date of visit:  3/29/2021    Subjective:    Carole Corey is a 64 y.o.  female who presents to [facility] today (3/29/2021) for evaluation of:    Chief Complaint   Patient presents with    Rash     on arms, neck, hands itches benadryl not working and nasal congestion       Rash  This is a new problem. The current episode started 1 to 4 weeks ago (X 4 weeks). The problem has been gradually worsening since onset. The affected locations include the left hand, left arm, right hand, right arm and neck. The rash is characterized by itchiness and redness. She was exposed to nothing. Associated symptoms include congestion (began 2-3 weeks ago). Pertinent negatives include no cough, diarrhea, fever, rhinorrhea, shortness of breath, sore throat or vomiting. (She had a chemical burn from work 4 weeks ago on right forearm which is now resolved; sneezing; bilateral eyes itching) Treatments tried: benadryl, benadryl cream, cortisone cream; zyrtec that she stopped 2 days ago and started Allegra; nasacort; flonase. The treatment provided no relief.        She has the following problem list:  Patient Active Problem List   Diagnosis    CVA [facility]cere[facility] vascular accident) ([facility] [address]    Hx-TIA (transient ischemic attack)    Migraine    Tobacco abuse    Family history of colon cancer    Facial weakness    Memory problem    Anxiety    Sleep difficulties    VA (obstructive sleep apnea)    Left hemiparesis (HCC)    Gastroesophageal reflux disease without esophagitis    Hordeolum externum of right upper eyelid    Preseptal cellulitis of right upper eyelid    Squamous blepharitis of upper and lower eyelids of both eyes        Current medications are: Current Outpatient Medications   Medication Sig Dispense Refill    predniSONE (DELTASONE) 20 MG tablet Take 40 mg for 5 days then take 20 mg for 5 days. 15 tablet 0    clopidogrel (PLAVIX) 75 MG tablet TAKE 1 TABLET DAILY 90 tablet 0    pantoprazole (PROTONIX) 40 MG tablet TAKE 1 TABLET DAILY 90 tablet 1    solifenacin (VESICARE) 5 MG tablet Take 1 tablet by mouth daily 90 tablet 1    celecoxib (CELEBREX) 200 MG capsule Take 1 capsule by mouth daily 90 capsule 3    zoster recombinant adjuvanted vaccine (SHINGRIX) 50 MCG/0.5ML SUSR injection Inject 0.5 mLs into the muscle See Admin Instructions 1 dose now and repeat in 2-6 months 0.5 mL 1    triamcinolone (KENALOG) 0.1 % cream Apply topically 3 times daily 80 g 0    fluticasone (FLONASE) 50 MCG/ACT nasal spray 2 sprays by Each Nostril route daily 3 Bottle 1    cetirizine (ZYRTEC) 10 MG tablet Take 1 tablet by mouth daily 90 tablet 1    zoster recombinant adjuvanted vaccine (SHINGRIX) 50 MCG/0.5ML SUSR injection Inject 0.5 mLs into the muscle See Admin Instructions 1 dose now and repeat in 2-6 months 0.5 mL 1    Cyanocobalamin (VITAMIN B 12 PO) Take by mouth      Cholecalciferol (VITAMIN D3) 5000 UNITS TABS Take 1 each by mouth daily       No current facility-administered medications for this visit. She is allergic to tape [adhesive tape]. .    She  reports that she has been smoking. She has a 10.00 pack-year smoking history. She has never used smokeless tobacco.      Objective:    Vitals:    03/29/21 0914   BP: 126/70   Site: Left Upper Arm   Position: Sitting   Cuff Size: Medium Adult   Pulse: 80   Resp: 16   Temp: 97.2 °F (36.2 °C)   TempSrc: Temporal   SpO2: 97%   Weight: 178 lb 3.2 oz (80.8 kg)   Height: 5' 4\" (1.626 m)     Body mass index is 30.59 kg/m². Review of Systems   Constitutional: Negative. Negative for fever. HENT: Positive for congestion (began 2-3 weeks ago). Negative for rhinorrhea and sore throat. Respiratory: Negative. Negative for cough and shortness of breath. Cardiovascular: Negative. Gastrointestinal: Negative. Negative for diarrhea and vomiting. Skin: Positive for rash. Physical Exam  Vitals signs and nursing note reviewed. Constitutional:       Appearance: She is well-developed. HENT:      Head: Normocephalic. Jaw: There is normal jaw occlusion. Right Ear: Tympanic membrane, ear canal and external ear normal.      Left Ear: Tympanic membrane, ear canal and external ear normal.      Nose: Congestion present. Right Turbinates: Pale. Left Turbinates: Pale. Right Sinus: Maxillary sinus tenderness present. No frontal sinus tenderness. Left Sinus: Maxillary sinus tenderness present. No frontal sinus tenderness. Mouth/Throat:      Lips: Pink. Mouth: Mucous membranes are moist.      Pharynx: Oropharynx is clear. Uvula midline. Eyes:      Pupils: Pupils are equal, round, and reactive to light. Neck:      Musculoskeletal: Normal range of motion and neck supple. Cardiovascular:      Rate and Rhythm: Normal rate and regular rhythm. Heart sounds: Normal heart sounds. Pulmonary:      Effort: Pulmonary effort is normal.      Breath sounds: Normal breath sounds and air entry. Lymphadenopathy:      Cervical: No cervical adenopathy. Skin:     General: Skin is warm and dry. Findings: Erythema (and dry) and rash present. Rash is macular (with excoriation from patient scratching). Neurological:      General: No focal deficit present. Mental Status: She is alert and oriented to person, place, and time. Psychiatric:         Behavior: Behavior normal.         Thought Content: Thought content normal.       Assessment and Plan:    No results found for this visit on 03/29/21. Diagnosis Orders   1. Rash and nonspecific skin eruption  predniSONE (DELTASONE) 20 MG tablet       Take prednisone as directed. Continue Allegra and Flonase for sinus symptoms. Apply Benadryl cream as directed per package instruction. Avoid scratching. Follow up with PCP if symptoms persist or worsen. The use, risks, benefits, and side effects of prescribed or recommended medications were discussed. All questions were answered and the patient/caregiver voiced understanding. No orders of the defined types were placed in this encounter.         Electronically signed by STEF Graham CNP on 3/29/21 at 9:18 AM EDT

## 2021-03-29 NOTE — PATIENT INSTRUCTIONS
Patient Education        Rash: Care Instructions  Your Care Instructions  A rash is any irritation or inflammation of the skin. Rashes have many possible causes, including allergy, infection, illness, heat, and emotional stress. Follow-up care is a key part of your treatment and safety. Be sure to make and go to all appointments, and call your doctor if you are having problems. It's also a good idea to know your test results and keep a list of the medicines you take. How can you care for yourself at home? · Wash the area with water only. Soap can make dryness and itching worse. Pat dry. · Put cold, wet cloths on the rash to reduce itching. · Keep cool, and stay out of the sun. · Leave the rash open to the air as much of the time as possible. · Sometimes petroleum jelly (Vaseline) can help relieve the discomfort caused by a rash. A moisturizing lotion, such as Cetaphil, also may help. Calamine lotion may help for rashes caused by contact with something (such as a plant or soap) that irritated the skin. Use it 3 or 4 times a day. · If your doctor prescribed a cream, use it as directed. If your doctor prescribed medicine, take it exactly as directed. · If your rash itches so badly that it interferes with your normal activities, take an over-the-counter antihistamine, such as diphenhydramine (Benadryl) or loratadine (Claritin). Read and follow all instructions on the label. When should you call for help? Call your doctor now or seek immediate medical care if:    · You have signs of infection, such as:  ? Increased pain, swelling, warmth, or redness. ? Red streaks leading from the area. ? Pus draining from the area. ? A fever.     · You have joint pain along with the rash. Watch closely for changes in your health, and be sure to contact your doctor if:    · Your rash is changing or getting worse. For example, call if you have pain along with the rash, the rash is spreading, or you have new blisters.

## 2021-04-20 ENCOUNTER — TELEPHONE (OUTPATIENT)
Dept: FAMILY MEDICINE CLINIC | Age: 61
End: 2021-04-20

## 2021-04-20 DIAGNOSIS — J30.2 SEASONAL ALLERGIES: Primary | ICD-10-CM

## 2021-04-20 NOTE — TELEPHONE ENCOUNTER
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

## 2021-05-10 DIAGNOSIS — N32.81 OVERACTIVE BLADDER: ICD-10-CM

## 2021-05-10 DIAGNOSIS — K21.9 GASTROESOPHAGEAL REFLUX DISEASE WITHOUT ESOPHAGITIS: ICD-10-CM

## 2021-05-10 DIAGNOSIS — I63.9 CEREBROVASCULAR ACCIDENT (CVA), UNSPECIFIED MECHANISM (HCC): ICD-10-CM

## 2021-05-10 RX ORDER — SOLIFENACIN SUCCINATE 5 MG/1
TABLET, FILM COATED ORAL
Qty: 90 TABLET | Refills: 0 | Status: SHIPPED | OUTPATIENT
Start: 2021-05-10 | End: 2021-09-20

## 2021-05-10 RX ORDER — PANTOPRAZOLE SODIUM 40 MG/1
TABLET, DELAYED RELEASE ORAL
Qty: 90 TABLET | Refills: 0 | Status: SHIPPED | OUTPATIENT
Start: 2021-05-10 | End: 2021-05-26

## 2021-05-10 RX ORDER — CLOPIDOGREL BISULFATE 75 MG/1
TABLET ORAL
Qty: 90 TABLET | Refills: 0 | Status: SHIPPED | OUTPATIENT
Start: 2021-05-10 | End: 2021-09-20

## 2021-05-26 ENCOUNTER — OFFICE VISIT (OUTPATIENT)
Dept: FAMILY MEDICINE CLINIC | Age: 61
End: 2021-05-26
Payer: COMMERCIAL

## 2021-05-26 ENCOUNTER — HOSPITAL ENCOUNTER (OUTPATIENT)
Dept: LAB | Age: 61
Discharge: HOME | End: 2021-05-26
Payer: COMMERCIAL

## 2021-05-26 VITALS
HEART RATE: 76 BPM | TEMPERATURE: 97.3 F | DIASTOLIC BLOOD PRESSURE: 74 MMHG | BODY MASS INDEX: 29.88 KG/M2 | HEIGHT: 64 IN | WEIGHT: 175 LBS | SYSTOLIC BLOOD PRESSURE: 128 MMHG

## 2021-05-26 DIAGNOSIS — J30.0 VMR (VASOMOTOR RHINITIS): ICD-10-CM

## 2021-05-26 DIAGNOSIS — K21.9 GASTROESOPHAGEAL REFLUX DISEASE, UNSPECIFIED WHETHER ESOPHAGITIS PRESENT: ICD-10-CM

## 2021-05-26 DIAGNOSIS — R09.81 NASAL CONGESTION: ICD-10-CM

## 2021-05-26 DIAGNOSIS — K21.9 LPRD (LARYNGOPHARYNGEAL REFLUX DISEASE): Primary | ICD-10-CM

## 2021-05-26 DIAGNOSIS — J31.0 RHINITIS MEDICAMENTOSA: ICD-10-CM

## 2021-05-26 DIAGNOSIS — T48.5X5A RHINITIS MEDICAMENTOSA: ICD-10-CM

## 2021-05-26 DIAGNOSIS — R21 RASH AND NONSPECIFIC SKIN ERUPTION: ICD-10-CM

## 2021-05-26 PROCEDURE — 99243 OFF/OP CNSLTJ NEW/EST LOW 30: CPT | Performed by: NURSE PRACTITIONER

## 2021-05-26 PROCEDURE — 86003 ALLG SPEC IGE CRUDE XTRC EA: CPT

## 2021-05-26 PROCEDURE — 82785 ASSAY OF IGE: CPT

## 2021-05-26 PROCEDURE — 36415 COLL VENOUS BLD VENIPUNCTURE: CPT

## 2021-05-26 RX ORDER — PREDNISONE 20 MG/1
20 TABLET ORAL 2 TIMES DAILY
Qty: 10 TABLET | Refills: 0 | Status: SHIPPED | OUTPATIENT
Start: 2021-05-26 | End: 2021-05-31

## 2021-05-26 RX ORDER — MOMETASONE FUROATE 50 UG/1
SPRAY, METERED NASAL
Qty: 1 EACH | Refills: 11 | Status: SHIPPED | OUTPATIENT
Start: 2021-05-26 | End: 2021-06-15 | Stop reason: SDUPTHER

## 2021-05-26 RX ORDER — FAMOTIDINE 40 MG/1
40 TABLET, FILM COATED ORAL EVERY EVENING
Qty: 30 TABLET | Refills: 3 | Status: SHIPPED | OUTPATIENT
Start: 2021-05-26 | End: 2021-06-15 | Stop reason: SDUPTHER

## 2021-05-26 RX ORDER — RABEPRAZOLE SODIUM 20 MG/1
20 TABLET, DELAYED RELEASE ORAL DAILY
Qty: 30 TABLET | Refills: 3 | Status: SHIPPED | OUTPATIENT
Start: 2021-05-26 | End: 2021-06-15 | Stop reason: SDUPTHER

## 2021-05-26 RX ORDER — FEXOFENADINE HCL 180 MG/1
180 TABLET ORAL DAILY
COMMUNITY
End: 2021-05-26

## 2021-05-26 RX ORDER — RABEPRAZOLE SODIUM 10 MG/1
10 CAPSULE, DELAYED RELEASE ORAL
COMMUNITY
End: 2021-05-26 | Stop reason: DRUGHIGH

## 2021-05-26 SDOH — ECONOMIC STABILITY: FOOD INSECURITY: WITHIN THE PAST 12 MONTHS, THE FOOD YOU BOUGHT JUST DIDN'T LAST AND YOU DIDN'T HAVE MONEY TO GET MORE.: NEVER TRUE

## 2021-05-26 SDOH — ECONOMIC STABILITY: FOOD INSECURITY: WITHIN THE PAST 12 MONTHS, YOU WORRIED THAT YOUR FOOD WOULD RUN OUT BEFORE YOU GOT MONEY TO BUY MORE.: NEVER TRUE

## 2021-05-26 ASSESSMENT — ENCOUNTER SYMPTOMS
WATER BRASH: 1
RHINORRHEA: 1
GLOBUS SENSATION: 1
HOARSE VOICE: 1
VOICE CHANGE: 1
HEARTBURN: 1
CHOKING: 1

## 2021-05-26 NOTE — PROGRESS NOTES
2021    Danish Gonzalez (:  1960) is a 64 y.o. female, here for evaluation of the following medical concerns: referred by Savita Campos for allergy evaluation. Rash  This is a new problem. The current episode started 1 to 4 weeks ago. The affected locations include the left arm and right arm. The rash is characterized by itchiness and redness. She was exposed to nothing. Associated symptoms include congestion and rhinorrhea. Treatments tried: benadryl, allegra and zyrtec. The treatment provided no relief. There is no history of allergies or asthma. Gastroesophageal Reflux  She complains of choking, dysphagia, globus sensation, heartburn, a hoarse voice and water brash. This is a chronic problem. The current episode started more than 1 year ago. The problem occurs frequently. The problem has been unchanged. The heartburn duration is several minutes. The heartburn is located in the substernum. The heartburn is of moderate intensity. The heartburn does not wake her from sleep. The heartburn does not limit her activity. The heartburn doesn't change with position. The symptoms are aggravated by certain foods, lying down and bending. Pertinent negatives include no weight loss. Risk factors include smoking/tobacco exposure. She has tried a PPI for the symptoms. The treatment provided mild relief. Past procedures do not include an abdominal ultrasound, an EGD, esophageal manometry, esophageal pH monitoring, H. pylori antibody titer or a UGI. Past invasive treatments do not include gastroplasty, gastroplication or reflux surgery. Review of Systems   Constitutional: Negative for weight loss. HENT: Positive for congestion, hoarse voice, postnasal drip, rhinorrhea and voice change. Respiratory: Positive for choking. Gastrointestinal: Positive for dysphagia and heartburn. Skin: Positive for rash. All other systems reviewed and are negative.       Prior to Visit Medications    Medication Sig Taking? Authorizing Provider   Probiotic Product (PROBIOTIC-10 PO) Take by mouth Yes Historical Provider, MD   RABEprazole (ACIPHEX) 20 MG tablet Take 1 tablet by mouth daily Yes STEF Montelongo CNP   famotidine (PEPCID) 40 MG tablet Take 1 tablet by mouth every evening Yes STEF Montelongo CNP   mometasone (NASONEX) 50 MCG/ACT nasal spray Two sprays to each nostril once daily.  Yes STEF Montelongo CNP   predniSONE (DELTASONE) 20 MG tablet Take 1 tablet by mouth 2 times daily for 5 days Take with food Yes STEF Montelongo CNP   clopidogrel (PLAVIX) 75 MG tablet TAKE 1 TABLET DAILY Yes RUSSELL Geronimo   solifenacin (VESICARE) 5 MG tablet TAKE 1 TABLET DAILY Yes Marlene Geronimo   celecoxib (CELEBREX) 200 MG capsule Take 1 capsule by mouth daily Yes RUSSELL Geronimo   Cyanocobalamin (VITAMIN B 12 PO) Take by mouth Yes Historical Provider, MD   Cholecalciferol (VITAMIN D3 PO) Take by mouth daily  Yes Historical Provider, MD   zoster recombinant adjuvanted vaccine (SHINGRIX) 50 MCG/0.5ML SUSR injection Inject 0.5 mLs into the muscle See Admin Instructions 1 dose now and repeat in 2-6 months  Marlene Geronimo        Allergies   Allergen Reactions    Tape Yaneth Hendricks Tape] Rash       Past Medical History:   Diagnosis Date    Anxiety     Cerebrovascular disease     Mini stroke in 2006    CVA (cerebral infarction)     Facial weakness     left    Family history of colon cancer     Gastroesophageal reflux disease without esophagitis 5/31/2016    History of TIAs     Hypoglycemia     Left hemiparesis ([facility] [address]     Memory problem     Migraine     Sleep difficulties     Speech abnormality     Tobacco abuse     Unspecified sleep apnea        Past Surgical History:   Procedure Laterality Date    APPENDECTOMY      CARDIAC CATHETERIZATION  7-2-15    nml    CHOLECYSTECTOMY      COLONOSCOPY  4/29/2015    2 polyps, sigmoid diverticulosis    FOOT SURGERY Right     HYSTERECTOMY Disease Paternal Grandfather     Diabetes Sister     Thyroid Disease Sister     Mental Retardation Sister     Cancer Brother         colon, prostate, lung    Diabetes Brother     Diabetes Brother     Other Brother         hyperglycemia    Heart Disease Brother         atrial fibrillation    Other Other         Jack Syndrome/Jack Syndrome    Other Grandchild         2nd Grandson with Hunters Syndrome       Vitals:    05/26/21 0847   BP: 128/74   Site: Right Upper Arm   Position: Sitting   Cuff Size: Large Adult   Pulse: 76   Temp: 97.3 °F (36.3 °C)   TempSrc: Tympanic   Weight: 175 lb (79.4 kg)   Height: 5' 4\" (1.626 m)     Estimated body mass index is 30.04 kg/m² as calculated from the following:    Height as of this encounter: 5' 4\" (1.626 m). Weight as of this encounter: 175 lb (79.4 kg). Physical Exam   Constitutional  Appears stated age. Head normocephalic and atraumatic. Psych  Alert and oriented. Pleasant and cooperative. Chest  Rises and falls symmetrically with no evidence of respiratory distress. Lungs  0/40 - no wheeze or other adventitious breath sounds    Nose  Pallor: 2-Healthy/Pink    Bogginess: 3-Turbinate occupies greater than 50% of the diameter of naris    Wetness: 2-scant secretions    Redness: 2-healthy pink    Mouth  Pharynx clear, uvula midline, dentition WDL. Ears  Ear canals WDL, TM's katarina grey with visible light reflex. Eyes  Pupils equal and reactive. EOM's WDL. Wears glasses. Heart  Heart rate regular. Rhythm without murmur, click, rub or gallop. Skin  Warm, dry and intact. Rash on forearms. Neck  Supple. ROM WDL for age. No lymphadenopathy or thyromegaly. Musculoskeletal  ROM WDL for stated age. Extremities without clubbing, cyanosis or edema. ASSESSMENT/PLAN:  1. LPRD (laryngopharyngeal reflux disease)  - crease dose of Aciphex and take 30-60 minutes prior to a meal  - RABEprazole (ACIPHEX) 20 MG tablet;  Take 1 tablet by mouth daily  Dispense: 30 tablet; Refill: 3  - famotidine (PEPCID) 40 MG tablet; Take 1 tablet by mouth every evening  Dispense: 30 tablet; Refill: 3    2. Gastroesophageal reflux disease, unspecified whether esophagitis present  - RABEprazole (ACIPHEX) 20 MG tablet; Take 1 tablet by mouth daily  Dispense: 30 tablet; Refill: 3  - famotidine (PEPCID) 40 MG tablet; Take 1 tablet by mouth every evening  Dispense: 30 tablet; Refill: 3    3. VMR (vasomotor rhinitis)  - mometasone (NASONEX) 50 MCG/ACT nasal spray; Two sprays to each nostril once daily. Dispense: 1 each; Refill: 11    4. Rhinitis medicamentosa  - stop OTC oxymetazoline  - mometasone (NASONEX) 50 MCG/ACT nasal spray; Two sprays to each nostril once daily. Dispense: 1 each; Refill: 11    5. Nasal congestion  - Allergen, Respiratory, Region 5 Panel; Future    6. Rash and nonspecific skin eruption  - predniSONE (DELTASONE) 20 MG tablet; Take 1 tablet by mouth 2 times daily for 5 days Take with food  Dispense: 10 tablet; Refill: 0      Return in about 6 weeks (around 7/7/2021). An  electronic signature was used to authenticate this note.     --STEF Fishman - CNP on 5/26/2021 at 10:10 AM

## 2021-05-26 NOTE — PROGRESS NOTES
Symptoms experienced by patient  Runny nose  No Circles under eyes Yes Post nasal drip Yes Difficulty breathing No   Stuffy nose Yes Grumpiness No Hoarseness Yes Short of breath No   Sniffling  No Fatigue Yes Face pain/pressure Yes Tight/heavy chest No   Sneezing Yes Nosebleeds Yes Sore throat No cough Yes   Blowing nose Yes Nasal/sinus surgery No Headache  Yes Cough up mucus No   Itchy/teary eyes Yes Nasal polyps No Upper teeth hurt No Cough up blood No   Itchy ears No Hearing loss No Night cough No Chest pain No   Itchy nose No Ear problems Yes Throat clearing No Asthma No   Itchy throat No Tubes in ears No Bad breath No Wheezing No   Itchy roof of mouth No Snoring No Bad taste in mouth No Pneumonia No   Nose rubbing No Mouth breathing Yes  Ankle swelling No    Overbite No  Difficulty breathing on exertion No    Drooling (toddler) No           Does patient experience? Heartburn and indigestion No  Vomiting easily No  Use of antacids (Rolaids, Tums, Maalox) Yes  Regurgitation of stomach contents No  Chronic cough worse after meals No  Chronic cough cough worse after laying down Yes  Chronic hoarseness or voice change Yes  Chest pain No  Stomach pain Yes  Neck pain No  Sore throat-frequent No  Feeling of throat closing or something stuck in throat Yes  Frequent burps or hiccups No  Adult onset asthma No  Asthma not relieved with usual treatment No  Anemia No  Asthma worse after meals, alcohol, lying down, bending to tie shoes, or after heartburn No  Chronic sinus disease No    Within the last month, how did the following problems affect the patient?   0=No Problem; 5=Severe Problem    Hoarseness or a problem with your voice 3  Clearing your throat 2  Excess throat mucus or postnasal drip 3  Difficulty swallowing food, liquids, pills 4  Coughing after you ate or after lying down 4  Breathing difficulties or choking episodes 0  Troublesome or annoying cough 3  Sensations of something sticking in your throat or a lump in your throat 0  Heartburn, chest pain, indigestion, or stomach acid coming up 0    Total: 19

## 2021-05-26 NOTE — PATIENT INSTRUCTIONS
GERD Management  Your reflux medication Rabeprazole is a Proton-Pump inhibitor. It works best if taken 30-60 minutes before a meal that includes fat and protein. Pepcid should be taken at bedtime. Antacids may be taken with the last bite of food at each meal and right before going to bed. Laryngopharyngeal Reflux (LPR) is the backflow of food, stomach acid or the gas produced by digestion all the way up the esophagus and into the larynx (the voice box) or the pharynx (the throat). LPR can occur during the day or night, even if a person who has LPR hasn't eaten during the last couple of hours. Not everyone who has reflux has LPR. Some people have reflux just into the esophagus (the swallowing tube that joins the throat to the stomach). If this happens a lot, a person may develop heartburn (a painful, burning sensation in the chest). Many people with LPR don't have heartburn. . Why is that? Some people with LPR have a lot of heartburn. Many people with LPR have very little heartburn. In fact, about half the people who have LPR never have heartburn at all. This is because the material that refluxes does not stay in the esophagus for very long so the acid does not have enough time to irritate the esophagus. It can also be due to refluxing of acidic gas rather than solid or liquid matter. However, if even small amounts of refluxed material come all of the way up into the throat, other problems can occur. This is because compared to the esophagus, the voice box and throat are much more sensitive to injury and irritation from stomach acid. How do I know if I have LPR? Chronic hoarseness, throat clearing, and cough, as well as the feeling of a lump in the throat or difficulty swallowing, may be signs that you have LPR. Some people do have heartburn too. Some people have hoarseness that comes and goes, and others have a problem with too much nose and throat mucus or phlegm.     If your provider thinks you could have LPR, you will probably have a throat exam.  You may require a more extensive exam to look at the voice box and lower throat. If these areas are swollen and/or red, you may have LPR. Your provider may order  further testing or recommend specific treatments. How is LPR treated? Treatment for LPR should be individualized, and your provider will suggest the best treatment for you. Generally, there are several treatments for LPR:    1) Changing lifestyle habits and diet to reduce reflux  2) Medications to reduce stomach acid   3) Surgery to prevent reflux    Most people with LPR need to modify how and when they eat, as well as take some medication to get well. Tips for reducing reflux and LPR  *If you use tobacco, quit. *Don't wear clothing that is tight around the waist.  *Don't eat within 3 hours of bedtime or lay down right after eating. *Avoid coffee, tea and soda containing caffeine. *Avoid mints if they increase your symptoms. *Avoid alcohol. *Avoid spicy foods and beverages. *Avoid citrus and tomato as they contain acid. What kind of problems can LPR cause and they serious? LPR can cause serious problems such as:  noisy breathing  choking episodes  breathing problems  Asthma  Bronchitis    If LPR is severe and goes untreated for a long time, it can cause cancer of the esophagus, throat, or voice box. Patient Education        Gastroesophageal Reflux Disease (GERD): Care Instructions  Overview     Gastroesophageal reflux disease (GERD) is the backward flow of stomach acid into the esophagus. The esophagus is the tube that leads from your throat to your stomach. A one-way valve prevents the stomach acid from backing up into this tube. But when you have GERD, this valve does not close tightly enough. This can also cause pain and swelling in your esophagus.  (This is called esophagitis.)  If you have mild GERD symptoms including heartburn, you may be able to control the problem with antacids or over-the-counter medicine. You can also make lifestyle changes to help reduce your symptoms. These include changing your diet and eating habits, such as not eating late at night and losing weight. Follow-up care is a key part of your treatment and safety. Be sure to make and go to all appointments, and call your doctor if you are having problems. It's also a good idea to know your test results and keep a list of the medicines you take. How can you care for yourself at home? · Take your medicines exactly as prescribed. Call your doctor if you think you are having a problem with your medicine. · Your doctor may recommend over-the-counter medicine. For mild or occasional indigestion, antacids, such as Tums, Gaviscon, Mylanta, or Maalox, may help. Your doctor also may recommend over-the-counter acid reducers, such as famotidine (Pepcid AC), cimetidine (Tagamet HB), or omeprazole (Prilosec). Read and follow all instructions on the label. If you use these medicines often, talk with your doctor. · Change your eating habits. ? It's best to eat several small meals instead of two or three large meals. ? After you eat, wait 2 to 3 hours before you lie down. ? Chocolate, mint, and alcohol can make GERD worse. ? Spicy foods, foods that have a lot of acid (like tomatoes and oranges), and coffee can make GERD symptoms worse in some people. If your symptoms are worse after you eat a certain food, you may want to stop eating that food to see if your symptoms get better. · Do not smoke or chew tobacco. Smoking can make GERD worse. If you need help quitting, talk to your doctor about stop-smoking programs and medicines. These can increase your chances of quitting for good. · If you have GERD symptoms at night, raise the head of your bed 6 to 8 inches by putting the frame on blocks or placing a foam wedge under the head of your mattress.  (Adding extra pillows does not work.)  · Do not wear tight clothing around your middle. · Lose weight if you need to. Losing just 5 to 10 pounds can help. When should you call for help? Call your doctor now or seek immediate medical care if:    · You have new or different belly pain.     · Your stools are black and tarlike or have streaks of blood. Watch closely for changes in your health, and be sure to contact your doctor if:    · Your symptoms have not improved after 2 days.     · Food seems to catch in your throat or chest.   Where can you learn more? Go to https://[employer]. org and sign in to your [employer] account. Enter H666 in the Dianxin box to learn more about \"Gastroesophageal Reflux Disease (GERD): Care Instructions. \"     If you do not have an account, please click on the \"Sign Up Now\" link. Current as of: April 15, 2020               Content Version: 12.8  © 7296-9222 [employer]. Care instructions adapted under license by [facility] ([facility]). If you have questions about a medical condition or this instruction, always ask your healthcare professional. [place] [address] any warranty or liability for your use of this information. Instructions for use of [employer] Sinus Irrigation kit:    1) Fill bottle with 8 oz distilled water. 2) Empty one salt packet into bottle of water    3) Add two-three drops of any no tears/tear free baby shampoo/wash    4) Gently mix ingredients together in bottle    5) If needed may place bottle in bowl of warm water to help warm the mixture for tolerability. 6) Standing in front of sink, bend forward to comfort level and tilt your head down and to the side that is going to be irrigated first.    7) Keep your mouth open to breathe, squeeze bottle gently until the solution starts draining from either nasal passage. 8)  Repeat the same on the opposite side.     *Maybe repeat, alternating sides as needed to empty the bottle    Recipe - 8 ounces of distilled water, 1/4 teaspoon non-iodized salt, pinch of baking soda, 2-3 drops of tear-free baby shampoo or baby wash    Wait 10-15 minutes post irrigation to use nasal spray

## 2021-05-28 LAB
2000687N OAK TREE IGE: <0.1 KU/L (ref 0–0.34)
ALLERGEN BERMUDA GRASS IGE: <0.1 KU/L (ref 0–0.34)
ALLERGEN BIRCH IGE: <0.1 KU/L (ref 0–0.34)
ALLERGEN DOG DANDER IGE: <0.1 KU/L (ref 0–0.34)
ALLERGEN GERMAN COCKROACH IGE: <0.1 KU/L (ref 0–0.34)
ALLERGEN HORMODENDRUM IGE: <0.1 KUL/L (ref 0–0.34)
ALLERGEN MOUSE EPITHELIA IGE: <0.1 KU/L (ref 0–0.34)
ALLERGEN PECAN TREE IGE: <0.1 KU/L (ref 0–0.34)
ALLERGEN PIGWEED ROUGH IGE: <0.1 KU/L (ref 0–0.34)
ALLERGEN SHEEP SORREL (W18) IGE: <0.1 KU/L (ref 0–0.34)
ALLERGEN TREE SYCAMORE: <0.1 KU/L (ref 0–0.34)
ALLERGEN WALNUT TREE IGE: <0.1 KU/L (ref 0–0.34)
ALLERGEN WHITE MULBERRY TREE, IGE: <0.1 KU/L (ref 0–0.34)
ALLERGEN, TREE, WHITE ASH IGE: <0.1 KU/L (ref 0–0.34)
ALTERNARIA ALTERNATA: <0.1 KU/L (ref 0–0.34)
ASPERGILLUS FUMIGATUS: <0.1 KU/L (ref 0–0.34)
CAT DANDER ANTIBODY: <0.1 KU/L (ref 0–0.34)
COTTONWOOD TREE: <0.1 KU/L (ref 0–0.34)
D. FARINAE: <0.1 KU/L (ref 0–0.34)
D. PTERONYSSINUS: <0.1 KU/L (ref 0–0.34)
ELM TREE: <0.1 KU/L (ref 0–0.34)
IGE: 17 IU/ML
MAPLE/BOXELDER TREE: <0.1 KU/L (ref 0–0.34)
MOUNTAIN CEDAR TREE: <0.1 KU/L (ref 0–0.34)
MUCOR RACEMOSUS: <0.1 KU/L (ref 0–0.34)
P. NOTATUM: <0.1 KU/L (ref 0–0.34)
RUSSIAN THISTLE: <0.1 KU/L (ref 0–0.34)
SHORT RAGWD(A ARTEMIS.) IGE: <0.1 KU/L (ref 0–0.34)
TIMOTHY GRASS: <0.1 KU/L (ref 0–0.34)

## 2021-06-15 DIAGNOSIS — K21.9 GASTROESOPHAGEAL REFLUX DISEASE, UNSPECIFIED WHETHER ESOPHAGITIS PRESENT: ICD-10-CM

## 2021-06-15 DIAGNOSIS — J31.0 RHINITIS MEDICAMENTOSA: ICD-10-CM

## 2021-06-15 DIAGNOSIS — K21.9 LPRD (LARYNGOPHARYNGEAL REFLUX DISEASE): ICD-10-CM

## 2021-06-15 DIAGNOSIS — J30.0 VMR (VASOMOTOR RHINITIS): ICD-10-CM

## 2021-06-15 DIAGNOSIS — T48.5X5A RHINITIS MEDICAMENTOSA: ICD-10-CM

## 2021-06-15 RX ORDER — MOMETASONE FUROATE 50 UG/1
SPRAY, METERED NASAL
Qty: 3 EACH | Refills: 0 | Status: SHIPPED | OUTPATIENT
Start: 2021-06-15 | End: 2022-04-20

## 2021-06-15 RX ORDER — RABEPRAZOLE SODIUM 20 MG/1
20 TABLET, DELAYED RELEASE ORAL DAILY
Qty: 90 TABLET | Refills: 0 | Status: SHIPPED | OUTPATIENT
Start: 2021-06-15 | End: 2021-09-16

## 2021-06-15 RX ORDER — FAMOTIDINE 40 MG/1
40 TABLET, FILM COATED ORAL EVERY EVENING
Qty: 90 TABLET | Refills: 0 | Status: SHIPPED | OUTPATIENT
Start: 2021-06-15 | End: 2021-09-16

## 2021-06-25 ENCOUNTER — OFFICE VISIT (OUTPATIENT)
Dept: FAMILY MEDICINE CLINIC | Age: 61
End: 2021-06-25
Payer: COMMERCIAL

## 2021-06-25 ENCOUNTER — HOSPITAL ENCOUNTER (OUTPATIENT)
Dept: GENERAL RADIOLOGY | Age: 61
Discharge: HOME | End: 2021-06-27
Payer: COMMERCIAL

## 2021-06-25 VITALS
BODY MASS INDEX: 28.68 KG/M2 | HEART RATE: 72 BPM | DIASTOLIC BLOOD PRESSURE: 72 MMHG | WEIGHT: 168 LBS | SYSTOLIC BLOOD PRESSURE: 126 MMHG | HEIGHT: 64 IN

## 2021-06-25 DIAGNOSIS — M54.50 ACUTE BILATERAL LOW BACK PAIN WITHOUT SCIATICA: ICD-10-CM

## 2021-06-25 DIAGNOSIS — M54.6 ACUTE LEFT-SIDED THORACIC BACK PAIN: ICD-10-CM

## 2021-06-25 DIAGNOSIS — M54.50 ACUTE BILATERAL LOW BACK PAIN WITHOUT SCIATICA: Primary | ICD-10-CM

## 2021-06-25 PROCEDURE — 72100 X-RAY EXAM L-S SPINE 2/3 VWS: CPT

## 2021-06-25 PROCEDURE — 72072 X-RAY EXAM THORAC SPINE 3VWS: CPT

## 2021-06-25 PROCEDURE — 99214 OFFICE O/P EST MOD 30 MIN: CPT | Performed by: FAMILY MEDICINE

## 2021-06-25 RX ORDER — IBUPROFEN 600 MG/1
600 TABLET ORAL EVERY 8 HOURS PRN
Qty: 90 TABLET | Refills: 3 | Status: SHIPPED | OUTPATIENT
Start: 2021-06-25 | End: 2022-04-20

## 2021-06-25 RX ORDER — CYCLOBENZAPRINE HCL 5 MG
5 TABLET ORAL 3 TIMES DAILY PRN
Qty: 60 TABLET | Refills: 0 | Status: SHIPPED | OUTPATIENT
Start: 2021-06-25 | End: 2021-07-15

## 2021-06-25 ASSESSMENT — ENCOUNTER SYMPTOMS
GASTROINTESTINAL NEGATIVE: 1
ALLERGIC/IMMUNOLOGIC NEGATIVE: 1
RESPIRATORY NEGATIVE: 1
EYES NEGATIVE: 1
SHORTNESS OF BREATH: 0
CHEST TIGHTNESS: 0

## 2021-06-25 ASSESSMENT — PATIENT HEALTH QUESTIONNAIRE - PHQ9
1. LITTLE INTEREST OR PLEASURE IN DOING THINGS: 0
2. FEELING DOWN, DEPRESSED OR HOPELESS: 0
SUM OF ALL RESPONSES TO PHQ9 QUESTIONS 1 & 2: 0
SUM OF ALL RESPONSES TO PHQ QUESTIONS 1-9: 0

## 2021-06-25 NOTE — PROGRESS NOTES
Review of Systems   Constitutional: Negative. HENT: Negative. Eyes: Negative. Respiratory: Negative. Negative for chest tightness and shortness of breath. Cardiovascular: Negative. Negative for chest pain. Gastrointestinal: Negative. Endocrine: Negative. Genitourinary: Negative. Musculoskeletal: Positive for arthralgias (back, left 12th rib on exam. ). Skin: Negative. Allergic/Immunologic: Negative. Neurological: Negative. Hematological: Negative. Psychiatric/Behavioral: Negative. Objective:   Physical Exam  Constitutional:       Appearance: Normal appearance. Cardiovascular:      Rate and Rhythm: Normal rate. Pulses: Normal pulses. Pulmonary:      Effort: Pulmonary effort is normal. No respiratory distress. Breath sounds: No wheezing or rales. Abdominal:      General: There is no distension. Musculoskeletal:      Cervical back: Normal range of motion. Thoracic back: Spasms (around left 12th rib, pain with palpation, sharp) present. Lumbar back: Tenderness (L5 area bilateral) present. Back:    Skin:     General: Skin is warm. Neurological:      General: No focal deficit present. Mental Status: She is alert. Psychiatric:         Mood and Affect: Mood normal.     /72 (Site: Right Upper Arm, Position: Sitting, Cuff Size: Large Adult)   Pulse 72   Ht 5' 4\" (1.626 m)   Wt 168 lb (76.2 kg)   BMI 28.84 kg/m²       Assessment:      Recurrent, sharp spasms after fall onto buttocks. Pain at L5 area and spasms and sharp pain with palpation of left 12th rib. Currently getting sharp spasms over the lower left chest wall, very acute/sharp/short lived pain . Plan:      Start ibuprofen every 8 hrs with meals. Start flexeril 5mg tid prn, cautioned drowsiness.     xrays of lumbar and thoracic spine to rule out compression fractures             Gagan Dang MD

## 2021-07-01 ENCOUNTER — OFFICE VISIT (OUTPATIENT)
Dept: FAMILY MEDICINE CLINIC | Age: 61
End: 2021-07-01
Payer: COMMERCIAL

## 2021-07-01 VITALS
HEIGHT: 64 IN | DIASTOLIC BLOOD PRESSURE: 70 MMHG | SYSTOLIC BLOOD PRESSURE: 112 MMHG | HEART RATE: 76 BPM | BODY MASS INDEX: 28.75 KG/M2 | WEIGHT: 168.4 LBS
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
## 2021-07-01 DIAGNOSIS — K21.9 GASTROESOPHAGEAL REFLUX DISEASE, UNSPECIFIED WHETHER ESOPHAGITIS PRESENT: ICD-10-CM

## 2021-07-01 DIAGNOSIS — N32.81 OVERACTIVE BLADDER: ICD-10-CM

## 2021-07-01 DIAGNOSIS — J30.0 VMR (VASOMOTOR RHINITIS): ICD-10-CM

## 2021-07-01 DIAGNOSIS — I63.9 CEREBROVASCULAR ACCIDENT (CVA), UNSPECIFIED MECHANISM (HCC): ICD-10-CM

## 2021-07-01 DIAGNOSIS — T48.5X5A RHINITIS MEDICAMENTOSA: ICD-10-CM

## 2021-07-01 DIAGNOSIS — J31.0 RHINITIS MEDICAMENTOSA: ICD-10-CM

## 2021-07-01 PROCEDURE — 99213 OFFICE O/P EST LOW 20 MIN: CPT | Performed by: PHYSICIAN ASSISTANT

## 2021-07-01 RX ORDER — PREDNISONE 20 MG/1
20 TABLET ORAL 2 TIMES DAILY
Qty: 10 TABLET | Refills: 0 | Status: SHIPPED | OUTPATIENT
Start: 2021-07-01 | End: 2021-07-06

## 2021-07-01 ASSESSMENT — ENCOUNTER SYMPTOMS
RESPIRATORY NEGATIVE: 1
ABDOMINAL PAIN: 0
HEARTBURN: 0
SINUS COMPLAINT: 1

## 2021-07-01 NOTE — LETTER
[facility] department of [facility]  Phone: [phone]  Fax: [phone]        Marlene Dye      July 1, 2021    Patient:   Devin Duncan  Date of Birth   1960  Date of visit   7/1/2021        To Whom it May Concern:      Annamaria Bauman was seen in my clinic on 7/1/2021. Please excuse from work 7/1/2021 thru 07/12/2021. If you have any questions or concerns, please don't hesitate to call.       Sincerely,      RUSSELL Dye/Leisa

## 2021-07-01 NOTE — PROGRESS NOTES
Subjective:      Patient ID: Teressa Gaytan is a 63 y.o. female. Fall  The accident occurred more than 1 week ago. The pain is present in the back. The symptoms are aggravated by movement. Pertinent negatives include no abdominal pain. She has tried NSAID for the symptoms. Gastroesophageal Reflux  She reports no abdominal pain or no heartburn. This is a chronic problem. The current episode started more than 1 month ago. She has tried a PPI for the symptoms. Urinary Frequency   This is a chronic problem. The current episode started more than 1 year ago. Associated symptoms include frequency. Pertinent negatives include no discharge. Sinus Problem  This is a chronic problem. The current episode started more than 1 year ago. There has been no fever. Pertinent negatives include no sneezing. Treatments tried: Nasonex. Review of Systems   Constitutional: Negative. HENT: Negative for sneezing. Respiratory: Negative. Cardiovascular: Negative. Gastrointestinal: Negative for abdominal pain and heartburn. Genitourinary: Positive for frequency. Objective:   Physical Exam  HENT:      Head: Normocephalic. Right Ear: Tympanic membrane normal.      Left Ear: Tympanic membrane normal.   Cardiovascular:      Rate and Rhythm: Normal rate. Pulmonary:      Effort: Pulmonary effort is normal.   Abdominal:      General: Abdomen is flat. Musculoskeletal:      Thoracic back: Spasms present. Back:    Skin:     General: Skin is warm and dry. Neurological:      General: No focal deficit present. Mental Status: She is alert. Psychiatric:         Mood and Affect: Mood normal.         Assessment:      1. Fall, initial encounter    2. Gastroesophageal reflux disease, unspecified whether esophagitis present    3. Cerebrovascular accident (CVA), unspecified mechanism ([address]    4. Overactive bladder    5. VMR (vasomotor rhinitis)    6.  Rhinitis medicamentosa          Plan:      Urgent Care visit reviewed with patient. Prednisone 20 mg bid x 5 days. Extend work note for next week. Continue chronic medications. Update laboratory studies per previous orders. Healthy diet and routine exercise. Patient refused COVID vaccination. Follow-up in six months/sooner PRN.         RUSSELL Spring

## 2021-07-12 ENCOUNTER — HOSPITAL ENCOUNTER (OUTPATIENT)
Dept: LAB | Age: 61
Discharge: HOME | End: 2021-07-12
Payer: COMMERCIAL

## 2021-07-12 DIAGNOSIS — R73.01 IFG (IMPAIRED FASTING GLUCOSE): ICD-10-CM

## 2021-07-12 DIAGNOSIS — I63.9 CEREBROVASCULAR ACCIDENT (CVA), UNSPECIFIED MECHANISM (HCC): ICD-10-CM

## 2021-07-12 LAB
ALBUMIN SERPL-MCNC: 4.2 G/DL (ref 3.5–5.2)
ALBUMIN/GLOBULIN RATIO: 1.8 (ref 1–2.5)
ALP BLD-CCNC: 67 U/L (ref 35–104)
ALT SERPL-CCNC: 14 U/L (ref 5–33)
ANION GAP SERPL CALCULATED.3IONS-SCNC: 7 MMOL/L (ref 9–17)
AST SERPL-CCNC: 11 U/L
BILIRUB SERPL-MCNC: 0.24 MG/DL (ref 0.3–1.2)
BUN BLDV-MCNC: 18 MG/DL (ref 6–20)
BUN/CREAT BLD: 31 (ref 9–20)
CALCIUM SERPL-MCNC: 9.1 MG/DL (ref 8.6–10.4)
CHLORIDE BLD-SCNC: 106 MMOL/L (ref 98–107)
CHOLESTEROL/HDL RATIO: 3.1
CHOLESTEROL: 171 MG/DL
CO2: 28 MMOL/L (ref 20–31)
CREAT SERPL-MCNC: 0.58 MG/DL (ref 0.5–0.9)
ESTIMATED AVERAGE GLUCOSE: 123 MG/DL
GFR AFRICAN AMERICAN: >60 ML/MIN
GFR NON-AFRICAN AMERICAN: >60 ML/MIN
GFR SERPL CREATININE-BSD FRML MDRD: ABNORMAL ML/MIN/{1.73_M2}
GFR SERPL CREATININE-BSD FRML MDRD: ABNORMAL ML/MIN/{1.73_M2}
GLUCOSE BLD-MCNC: 91 MG/DL (ref 70–99)
HBA1C MFR BLD: 5.9 % (ref 4–6)
HDLC SERPL-MCNC: 55 MG/DL
LDL CHOLESTEROL: 98 MG/DL (ref 0–130)
POTASSIUM SERPL-SCNC: 4.3 MMOL/L (ref 3.7–5.3)
SODIUM BLD-SCNC: 141 MMOL/L (ref 135–144)
TOTAL PROTEIN: 6.6 G/DL (ref 6.4–8.3)
TRIGL SERPL-MCNC: 92 MG/DL
VLDLC SERPL CALC-MCNC: NORMAL MG/DL (ref 1–30)

## 2021-07-12 PROCEDURE — 83036 HEMOGLOBIN GLYCOSYLATED A1C: CPT

## 2021-07-12 PROCEDURE — 36415 COLL VENOUS BLD VENIPUNCTURE: CPT

## 2021-07-12 PROCEDURE — 80061 LIPID PANEL: CPT

## 2021-07-12 PROCEDURE — 80053 COMPREHEN METABOLIC PANEL: CPT

## 2021-09-15 DIAGNOSIS — K21.9 LPRD (LARYNGOPHARYNGEAL REFLUX DISEASE): ICD-10-CM

## 2021-09-15 DIAGNOSIS — K21.9 GASTROESOPHAGEAL REFLUX DISEASE, UNSPECIFIED WHETHER ESOPHAGITIS PRESENT: ICD-10-CM

## 2021-09-16 RX ORDER — RABEPRAZOLE SODIUM 20 MG/1
TABLET, DELAYED RELEASE ORAL
Qty: 90 TABLET | Refills: 1 | Status: SHIPPED | OUTPATIENT
Start: 2021-09-16 | End: 2022-02-28

## 2021-09-16 RX ORDER — FAMOTIDINE 40 MG/1
TABLET, FILM COATED ORAL
Qty: 90 TABLET | Refills: 1 | Status: SHIPPED | OUTPATIENT
Start: 2021-09-16 | End: 2022-02-28

## 2021-09-20 DIAGNOSIS — I63.9 CEREBROVASCULAR ACCIDENT (CVA), UNSPECIFIED MECHANISM (HCC): ICD-10-CM

## 2021-09-20 DIAGNOSIS — N32.81 OVERACTIVE BLADDER: ICD-10-CM

## 2021-09-20 RX ORDER — CLOPIDOGREL BISULFATE 75 MG/1
TABLET ORAL
Qty: 90 TABLET | Refills: 1 | Status: SHIPPED | OUTPATIENT
Start: 2021-09-20 | End: 2022-03-17 | Stop reason: SDUPTHER

## 2021-09-20 RX ORDER — SOLIFENACIN SUCCINATE 5 MG/1
TABLET, FILM COATED ORAL
Qty: 90 TABLET | Refills: 1 | Status: SHIPPED | OUTPATIENT
Start: 2021-09-20 | End: 2022-02-28

## 2021-10-14 ENCOUNTER — OFFICE VISIT (OUTPATIENT)
Dept: PRIMARY CARE CLINIC | Age: 61
End: 2021-10-14
Payer: COMMERCIAL

## 2021-10-14 VITALS
WEIGHT: 169.4 LBS | SYSTOLIC BLOOD PRESSURE: 138 MMHG | TEMPERATURE: 98 F | HEART RATE: 74 BPM | DIASTOLIC BLOOD PRESSURE: 86 MMHG | OXYGEN SATURATION: 98 % | BODY MASS INDEX: 29.08 KG/M2

## 2021-10-14 DIAGNOSIS — M70.62 TROCHANTERIC BURSITIS OF LEFT HIP: Primary | ICD-10-CM

## 2021-10-14 PROCEDURE — 20610 DRAIN/INJ JOINT/BURSA W/O US: CPT | Performed by: FAMILY MEDICINE

## 2021-10-14 ASSESSMENT — PATIENT HEALTH QUESTIONNAIRE - PHQ9
2. FEELING DOWN, DEPRESSED OR HOPELESS: 0
1. LITTLE INTEREST OR PLEASURE IN DOING THINGS: 0
SUM OF ALL RESPONSES TO PHQ QUESTIONS 1-9: 0
SUM OF ALL RESPONSES TO PHQ9 QUESTIONS 1 & 2: 0
SUM OF ALL RESPONSES TO PHQ QUESTIONS 1-9: 0
SUM OF ALL RESPONSES TO PHQ QUESTIONS 1-9: 0

## 2021-10-14 ASSESSMENT — ENCOUNTER SYMPTOMS
ALLERGIC/IMMUNOLOGIC NEGATIVE: 1
RESPIRATORY NEGATIVE: 1
EYES NEGATIVE: 1
GASTROINTESTINAL NEGATIVE: 1

## 2021-10-14 NOTE — PATIENT INSTRUCTIONS
Patient Education        Trochanteric Bursitis: Exercises  Introduction  Here are some examples of exercises for you to try. The exercises may be suggested for a condition or for rehabilitation. Start each exercise slowly. Ease off the exercises if you start to have pain. You will be told when to start these exercises and which ones will work best for you. How to do the exercises  Hamstring wall stretch    1. Lie on your back in a doorway, with your good leg through the open door. 2. Slide your affected leg up the wall to straighten your knee. You should feel a gentle stretch down the back of your leg. 3. Hold the stretch for at least 1 minute to begin. Then try to lengthen the time you hold the stretch to as long as 6 minutes. 4. Repeat 2 to 4 times. 5. If you do not have a place to do this exercise in a doorway, there is another way to do it:  6. Lie on your back, and bend the knee of your affected leg. 7. Loop a towel under the ball and toes of that foot, and hold the ends of the towel in your hands. 8. Straighten your knee, and slowly pull back on the towel. You should feel a gentle stretch down the back of your leg. 9. Hold the stretch for 15 to 30 seconds. Or even better, hold the stretch for 1 minute if you can. 10. Repeat 2 to 4 times. 1. Do not arch your back. 2. Do not bend either knee. 3. Keep one heel touching the floor and the other heel touching the wall. Do not point your toes. Straight-leg raises to the outside    1. Lie on your side, with your affected leg on top. 2. Tighten the front thigh muscles of your top leg to keep your knee straight. 3. Keep your hip and your leg straight in line with the rest of your body, and keep your knee pointing forward. Do not drop your hip back. 4. Lift your top leg straight up toward the ceiling, about 12 inches off the floor. Hold for about 6 seconds, then slowly lower your leg. 5. Repeat 8 to 12 times. Clamshell    1.  Lie on your side, with your affected leg on top and your head propped on a pillow. Keep your feet and knees together and your knees bent. 2. Raise your top knee, but keep your feet together. Do not let your hips roll back. Your legs should open up like a clamshell. 3. Hold for 6 seconds. 4. Slowly lower your knee back down. Rest for 10 seconds. 5. Repeat 8 to 12 times. Standing quadriceps stretch    1. If you are not steady on your feet, hold on to a chair, counter, or wall. You can also lie on your stomach or your side to do this exercise. 2. Bend the knee of the leg you want to stretch, and reach behind you to grab the front of your foot or ankle with the hand on the same side. For example, if you are stretching your right leg, use your right hand. 3. Keeping your knees next to each other, pull your foot toward your buttock until you feel a gentle stretch across the front of your hip and down the front of your thigh. Your knee should be pointed directly to the ground, and not out to the side. 4. Hold the stretch for 15 to 30 seconds. 5. Repeat 2 to 4 times. Piriformis stretch    1. Lie on your back with your legs straight. 2. Lift your affected leg and bend your knee. With your opposite hand, reach across your body, and then gently pull your knee toward your opposite shoulder. 3. Hold the stretch for 15 to 30 seconds. 4. Repeat 2 to 4 times. Double knee-to-chest    1. Lie on your back with your knees bent and your feet flat on the floor. You can put a small pillow under your head and neck if it is more comfortable. 2. Bring both knees to your chest.  3. Keep your lower back pressed to the floor. Hold for 15 to 30 seconds. 4. Relax, and lower your knees to the starting position. 5. Repeat 2 to 4 times. Follow-up care is a key part of your treatment and safety. Be sure to make and go to all appointments, and call your doctor if you are having problems.  It's also a good idea to know your test results and keep a list of the medicines you take. Where can you learn more? Go to https://chpepiceweb.IDverge. org and sign in to your [employer] account. Enter U407 in the [facility] box to learn more about \"Trochanteric Bursitis: Exercises. \"     If you do not have an account, please click on the \"Sign Up Now\" link. Current as of: July 1, 2021               Content Version: 13.0  © 2006-2021 [employer]. Care instructions adapted under license by [facility] [place] ([facility]). If you have questions about a medical condition or this instruction, always ask your healthcare professional. [address] any warranty or liability for your use of this information. Learning About a Hip Bursa Injection  What is a hip bursa injection? A bursa is a small sac of fluid that cushions an area between tendons and bones. The bursa on the outer side of the hip bone is called the trochanteric (say \"ODPC-kix-DUXH-ik\") bursa. Sometimes it can become swollen and painful. This condition is called bursitis. An injection into the bursa is a shot of medicine to reduce pain and swelling. The medicines may include pain relievers and steroid medicines. A steroid shot can sometimes help with short-term pain relief when other treatments haven't worked. How is a hip bursa injection done? First the area over the bursa will be cleaned. Your doctor may use a tiny needle to numb the skin over the area where you will get the injection. If a tiny needle is used to numb the area, your doctor will use another needle to inject the medicine. Your doctor may use a pain reliever, a steroid, or both. You may feel some pressure or discomfort. Your doctor may put ice on the area before you go home. What can you expect after the injection? You will probably go home soon after your shot. You may have numbness over your hip for a few hours.   If your shot included both a pain reliever and a steroid, the pain will probably go away right away. But it might come back after a few hours. This might happen if the pain reliever wears off and the steroid has not started to work yet. The steroid medicine generally takes a few days to work. If the pain comes back, you can put ice or a cold pack on your hip for 10 to 20 minutes at a time. Put a thin cloth between the ice and your skin. Follow your doctor's instructions carefully. Avoid strenuous activities on the day you get the shot, especially those that put stress on your hip. Steroids don't always work. But when they do, the pain relief can last for several days to a few months or longer. Follow-up care is a key part of your treatment and safety. Be sure to make and go to all appointments, and call your doctor if you are having problems. It's also a good idea to know your test results and keep a list of the medicines you take. Where can you learn more? Go to https://[employer]. org and sign in to your [employer] account. Enter S405 in the [employer] box to learn more about \"Learning About a Hip Bursa Injection. \"     If you do not have an account, please click on the \"Sign Up Now\" link. Current as of: July 1, 2021               Content Version: 13.0  © 2006-2021 [employer]. Care instructions adapted under license by [facility] ([facility]). If you have questions about a medical condition or this instruction, always ask your healthcare professional. [place] [address] any warranty or liability for your use of this information.

## 2021-10-14 NOTE — PROGRESS NOTES
Subjective:      Patient ID: Jennifer Tran is a 63 y.o. female. HPI  Acute urgent care visit for left hip pain. No recalled injury or initiating event. Lateral hip pain by description, radiating down the side of the leg. Currently active about a week. Working a lot of hours, no prior issues. Cant lay on that side at night due to pain. Pain stepping up stairs. No prior issues. Past Medical History:   Diagnosis Date    Anxiety     Cerebrovascular disease     Mini stroke in 2006    CVA (cerebral infarction)     Facial weakness     left    Family history of colon cancer     Gastroesophageal reflux disease without esophagitis 5/31/2016    History of TIAs     Hypoglycemia     Left hemiparesis ([facility] [address]     Memory problem     Migraine     Sleep difficulties     Speech abnormality     Tobacco abuse     Unspecified sleep apnea      Past Surgical History:   Procedure Laterality Date    APPENDECTOMY      CARDIAC CATHETERIZATION  7-2-15    nml    CHOLECYSTECTOMY      COLONOSCOPY  4/29/2015    2 polyps, sigmoid diverticulosis    FOOT SURGERY Right     HYSTERECTOMY      OVARIAN CYST SURGERY      TONSILLECTOMY       Current Outpatient Medications   Medication Sig Dispense Refill    solifenacin (VESICARE) 5 MG tablet TAKE 1 TABLET DAILY 90 tablet 1    clopidogrel (PLAVIX) 75 MG tablet TAKE 1 TABLET DAILY 90 tablet 1    RABEprazole (ACIPHEX) 20 MG tablet TAKE 1 TABLET DAILY 90 tablet 1    famotidine (PEPCID) 40 MG tablet TAKE 1 TABLET EVERY EVENING 90 tablet 1    Multiple Vitamin (MULTI VITAMIN DAILY PO) Take by mouth      ibuprofen (ADVIL;MOTRIN) 600 MG tablet Take 1 tablet by mouth every 8 hours as needed for Pain 90 tablet 3    mometasone (NASONEX) 50 MCG/ACT nasal spray Two sprays to each nostril once daily.  3 each 0    celecoxib (CELEBREX) 200 MG capsule Take 1 capsule by mouth daily 90 capsule 3    Cyanocobalamin (VITAMIN B 12 PO) Take by mouth      Cholecalciferol (VITAMIN D3 PO) Take by mouth daily        No current facility-administered medications for this visit. Allergies   Allergen Reactions    Tape Electa Carlos Tape] Rash         Review of Systems   Constitutional: Negative. HENT: Negative. Eyes: Negative. Respiratory: Negative. Cardiovascular: Negative. Gastrointestinal: Negative. Endocrine: Negative. Genitourinary: Negative. Musculoskeletal: Positive for arthralgias (lateral left hip). Skin: Negative. Allergic/Immunologic: Negative. Neurological: Negative. Hematological: Negative. Psychiatric/Behavioral: Negative. Objective:   Physical Exam  Constitutional:       General: She is not in acute distress. Appearance: She is well-developed. HENT:      Head: Normocephalic and atraumatic. Right Ear: External ear normal.      Left Ear: External ear normal.      Mouth/Throat:      Pharynx: No oropharyngeal exudate. Eyes:      General: No scleral icterus. Conjunctiva/sclera: Conjunctivae normal.   Neck:      Thyroid: No thyromegaly. Cardiovascular:      Rate and Rhythm: Normal rate and regular rhythm. Heart sounds: Normal heart sounds. No murmur heard. Pulmonary:      Effort: Pulmonary effort is normal. No respiratory distress. Breath sounds: Normal breath sounds. No wheezing. Abdominal:      General: Bowel sounds are normal. There is no distension. Palpations: Abdomen is soft. Tenderness: There is no abdominal tenderness. There is no rebound. Musculoskeletal:         General: No tenderness. Normal range of motion. Cervical back: Neck supple. Legs:       Comments: Trochanteric bursitis      Skin:     General: Skin is warm and dry. Findings: No erythema or rash. Neurological:      Mental Status: She is alert and oriented to person, place, and time. Psychiatric:         Behavior: Behavior normal.         Thought Content:  Thought content normal.         Judgment: Judgment normal. /86 (Site: Right Upper Arm, Position: Sitting, Cuff Size: Large Adult)   Pulse 74   Temp 98 °F (36.7 °C) (Tympanic)   Wt 169 lb 6.4 oz (76.8 kg)   SpO2 98%   BMI 29.08 kg/m²     Assessment:      Encounter Diagnosis   Name Primary?  Trochanteric bursitis of left hip Yes           Plan:      Discussed typical course, supportive care, and complications   She desires injection today. Procedure : after consent and sterile prep: 5 cc lidocaine followed by 80mg kenalog injected into the right trochanteric bursae area in a fenestrated pattern. Patient tolerated well. Follow up with pcp or urgent care prn.           Swathi Farris MD

## 2021-12-02 DIAGNOSIS — M19.071 ARTHRITIS OF BOTH FEET: ICD-10-CM

## 2021-12-02 DIAGNOSIS — M19.072 ARTHRITIS OF BOTH FEET: ICD-10-CM

## 2021-12-02 RX ORDER — CELECOXIB 200 MG/1
200 CAPSULE ORAL DAILY
Qty: 90 CAPSULE | Refills: 0 | Status: SHIPPED | OUTPATIENT
Start: 2021-12-02 | End: 2022-04-01 | Stop reason: SDUPTHER

## 2021-12-02 NOTE — TELEPHONE ENCOUNTER
----- Message from Yannick Krishna sent at 12/1/2021  6:09 PM EST -----  Subject: Refill Request    QUESTIONS  Name of Medication? celecoxib (CELEBREX) 200 MG capsule  Patient-reported dosage and instructions? 1 a day  How many days do you have left? 0  Preferred Pharmacy? [facility] phone number (if available)? [phone]  ---------------------------------------------------------------------------  --------------  Daniel POST  What is the best way for the office to contact you? OK to leave message on   voicemail  Preferred Call Back Phone Number?  [phone]

## 2021-12-02 NOTE — TELEPHONE ENCOUNTER
Last Appt:  7/1/2021  Next Appt:   1/4/2022  Med verified in Epic Na 127 on arrival to ER  Likely secondary to poor oral intake in addition to excessive beer intake    Improving   Monitor

## 2021-12-13 ENCOUNTER — OFFICE VISIT (OUTPATIENT)
Dept: PRIMARY CARE CLINIC | Age: 61
End: 2021-12-13
Payer: COMMERCIAL

## 2021-12-13 VITALS
DIASTOLIC BLOOD PRESSURE: 78 MMHG | HEIGHT: 64 IN | OXYGEN SATURATION: 98 % | BODY MASS INDEX: 28.68 KG/M2 | TEMPERATURE: 97.7 F | SYSTOLIC BLOOD PRESSURE: 110 MMHG | HEART RATE: 79 BPM | WEIGHT: 168 LBS

## 2021-12-13 DIAGNOSIS — H66.002 NON-RECURRENT ACUTE SUPPURATIVE OTITIS MEDIA OF LEFT EAR WITHOUT SPONTANEOUS RUPTURE OF TYMPANIC MEMBRANE: Primary | ICD-10-CM

## 2021-12-13 PROCEDURE — 99213 OFFICE O/P EST LOW 20 MIN: CPT | Performed by: FAMILY MEDICINE

## 2021-12-13 RX ORDER — AMOXICILLIN 500 MG/1
500 CAPSULE ORAL 2 TIMES DAILY
Qty: 20 CAPSULE | Refills: 0 | Status: SHIPPED | OUTPATIENT
Start: 2021-12-13 | End: 2021-12-23

## 2021-12-13 ASSESSMENT — ENCOUNTER SYMPTOMS
SINUS PRESSURE: 1
VOMITING: 0
SHORTNESS OF BREATH: 0
COUGH: 0
NAUSEA: 1
SORE THROAT: 0

## 2021-12-13 NOTE — PROGRESS NOTES
[address] [place] [address]  Dept: [phone]  Dept Fax: [phone]  Loc: [phone]    Arabella Simpson is a 63 y.o. female who presents today for her medical conditions/complaints as noted below. Arabella Simpson is c/o of   Chief Complaint   Patient presents with   Candance Lion     left ear       HPI:     Here today for left ear pain. Otalgia   There is pain in the left ear. This is a new problem. The current episode started in the past 7 days. The problem occurs constantly. The problem has been gradually worsening. There has been no fever. The pain is mild. Associated symptoms include headaches. Pertinent negatives include no coughing, ear discharge, hearing loss, neck pain, rash, sore throat or vomiting. The treatment provided no relief. There is no history of a chronic ear infection or hearing loss. Left ear pain, worse with swallowing onset yesterday, sinus congestion with frontal and maxillary sinal pressure and pain for the past 3-4 days. She saw a chiropractor used used a device to release mucus from frontal sinuses. She uses ear plugs at work every night. She has tried an OTC decongestant, Tylenol/Motrin without relief of her symptoms. Denies fever, chills, cough. She does report some mild nausea d/t post-nasal drip, no vomiting.         Past Medical History:   Diagnosis Date    Anxiety     Cerebrovascular disease     Mini stroke in 2006    CVA (cerebral infarction)     Facial weakness     left    Family history of colon cancer     Gastroesophageal reflux disease without esophagitis 5/31/2016    History of TIAs     Hypoglycemia     [facility] hem[facility]aresis ([facility] [address]     Memory problem     Migraine     Sleep difficulties     Speech abnormality     Tobacco abuse     Unspecified sleep apnea           Social History     Tobacco Use    Smoking status: Current Every Day Smoker     Packs/day: 0.50     Years: 20.00     Pack years: 10.00    Smokeless tobacco: Never Used   Substance Use Topics    Alcohol use: No     Alcohol/week: 0.0 standard drinks     Current Outpatient Medications   Medication Sig Dispense Refill    amoxicillin (AMOXIL) 500 MG capsule Take 1 capsule by mouth 2 times daily for 10 days 20 capsule 0    celecoxib (CELEBREX) 200 MG capsule Take 1 capsule by mouth daily 90 capsule 0    solifenacin (VESICARE) 5 MG tablet TAKE 1 TABLET DAILY 90 tablet 1    clopidogrel (PLAVIX) 75 MG tablet TAKE 1 TABLET DAILY 90 tablet 1    RABEprazole (ACIPHEX) 20 MG tablet TAKE 1 TABLET DAILY 90 tablet 1    famotidine (PEPCID) 40 MG tablet TAKE 1 TABLET EVERY EVENING 90 tablet 1    Multiple Vitamin (MULTI VITAMIN DAILY PO) Take by mouth      ibuprofen (ADVIL;MOTRIN) 600 MG tablet Take 1 tablet by mouth every 8 hours as needed for Pain 90 tablet 3    mometasone (NASONEX) 50 MCG/ACT nasal spray Two sprays to each nostril once daily. 3 each 0    Cyanocobalamin (VITAMIN B 12 PO) Take by mouth      Cholecalciferol (VITAMIN D3 PO) Take by mouth daily        No current facility-administered medications for this visit. Allergies   Allergen Reactions    Tape Nannette Cox] Rash       Subjective:     Review of Systems   Constitutional: Negative for appetite change, chills, fatigue and fever. HENT: Positive for ear pain and sinus pressure. Negative for ear discharge, hearing loss and sore throat. Respiratory: Negative for cough and shortness of breath. Cardiovascular: Negative for chest pain. Gastrointestinal: Positive for nausea. Negative for vomiting. Musculoskeletal: Negative for neck pain. Skin: Negative for rash. Neurological: Positive for headaches. Objective:      Physical Exam  Vitals and nursing note reviewed. Constitutional:       General: She is not in acute distress. Appearance: She is well-developed.    HENT:      Right Ear: Tympanic membrane, ear canal and external ear normal.      Left Ear: Ear canal and external ear normal. Tenderness present. Tympanic membrane is erythematous. Nose: Mucosal edema present. Right Sinus: Frontal sinus tenderness present. No maxillary sinus tenderness. Left Sinus: Maxillary sinus tenderness and frontal sinus tenderness present. Mouth/Throat:      Pharynx: No oropharyngeal exudate. Eyes:      Conjunctiva/sclera: Conjunctivae normal.   Neck:      Thyroid: No thyromegaly. Cardiovascular:      Rate and Rhythm: Normal rate and regular rhythm. Heart sounds: Normal heart sounds. No murmur heard. Pulmonary:      Effort: Pulmonary effort is normal. No respiratory distress. Breath sounds: Normal breath sounds. No wheezing or rales. Musculoskeletal:      Cervical back: Normal range of motion and neck supple. Lymphadenopathy:      Cervical: No cervical adenopathy. Skin:     General: Skin is warm and dry. Findings: No erythema or rash. Neurological:      Mental Status: She is alert and oriented to person, place, and time. Psychiatric:         Behavior: Behavior normal.         Judgment: Judgment normal.       /78   Pulse 79   Temp 97.7 °F (36.5 °C)   Ht 5' 4\" (1.626 m)   Wt 168 lb (76.2 kg)   SpO2 98%   BMI 28.84 kg/m²     Assessment:       Diagnosis Orders   1. Non-recurrent acute suppurative otitis media of left ear without spontaneous rupture of tympanic membrane               Plan:        Otits media: new; I will treat with amoxicillin and she was advised to use tylenol and nsaids for pain. Return if symptoms worsen or fail to improve. Orders Placed This Encounter   Medications    amoxicillin (AMOXIL) 500 MG capsule     Sig: Take 1 capsule by mouth 2 times daily for 10 days     Dispense:  20 capsule     Refill:  0       Patientgiven educational materials - see patient instructions. Discussed use, benefit,and side effects of prescribed medications.   All patient questions answered. Ptvoiced understanding. Reviewed health maintenance. Instructed to continue currentmedications, diet and exercise. Patient agreed with treatment plan. Follow up asdirected.      Electronically signed by Ben Thomson MD on 12/13/2021 at 9:02 AM

## 2022-01-04 ENCOUNTER — TELEPHONE (OUTPATIENT)
Dept: FAMILY MEDICINE CLINIC | Age: 62
End: 2022-01-04

## 2022-01-17 ENCOUNTER — NURSE TRIAGE (OUTPATIENT)
Dept: OTHER | Facility: CLINIC | Age: 62
End: 2022-01-17

## 2022-01-17 ENCOUNTER — OFFICE VISIT (OUTPATIENT)
Dept: PRIMARY CARE CLINIC | Age: 62
End: 2022-01-17
Payer: COMMERCIAL

## 2022-01-17 VITALS
WEIGHT: 166 LBS | TEMPERATURE: 98.3 F | DIASTOLIC BLOOD PRESSURE: 74 MMHG | HEART RATE: 74 BPM | OXYGEN SATURATION: 98 % | BODY MASS INDEX: 28.49 KG/M2 | SYSTOLIC BLOOD PRESSURE: 120 MMHG

## 2022-01-17 DIAGNOSIS — U07.1 COVID-19: Primary | ICD-10-CM

## 2022-01-17 PROCEDURE — 99213 OFFICE O/P EST LOW 20 MIN: CPT | Performed by: FAMILY MEDICINE

## 2022-01-17 RX ORDER — BENZONATATE 100 MG/1
200 CAPSULE ORAL 3 TIMES DAILY PRN
Qty: 60 CAPSULE | Refills: 0 | Status: SHIPPED | OUTPATIENT
Start: 2022-01-17 | End: 2022-01-24

## 2022-01-17 RX ORDER — ALBUTEROL SULFATE 90 UG/1
2 AEROSOL, METERED RESPIRATORY (INHALATION) EVERY 4 HOURS PRN
Qty: 18 G | Refills: 0 | Status: SHIPPED | OUTPATIENT
Start: 2022-01-17 | End: 2022-04-20

## 2022-01-17 ASSESSMENT — ENCOUNTER SYMPTOMS
DIARRHEA: 0
CONSTIPATION: 0
TROUBLE SWALLOWING: 0
RHINORRHEA: 1
ABDOMINAL PAIN: 0
SINUS PRESSURE: 1
WHEEZING: 0
NAUSEA: 0
CHOKING: 0
SHORTNESS OF BREATH: 1
CHEST TIGHTNESS: 0
SORE THROAT: 1
SPUTUM PRODUCTION: 1
COUGH: 0

## 2022-01-17 ASSESSMENT — PATIENT HEALTH QUESTIONNAIRE - PHQ9
SUM OF ALL RESPONSES TO PHQ QUESTIONS 1-9: 0
SUM OF ALL RESPONSES TO PHQ QUESTIONS 1-9: 0
2. FEELING DOWN, DEPRESSED OR HOPELESS: 0
SUM OF ALL RESPONSES TO PHQ9 QUESTIONS 1 & 2: 0
1. LITTLE INTEREST OR PLEASURE IN DOING THINGS: 0
SUM OF ALL RESPONSES TO PHQ QUESTIONS 1-9: 0
SUM OF ALL RESPONSES TO PHQ QUESTIONS 1-9: 0

## 2022-01-17 NOTE — PROGRESS NOTES
[facility] [address] [place] [address] Dr  [address] [place] [address]  Dept: [phone]  Dept Fax: [phone]    Jacky Silver is a 63 y.o. female who presents today for her medical conditions/complaints as noted below. Jacky Silver is c/o of   Chief Complaint   Patient presents with    Shortness of Breath     positive covid Saturday        HPI:     Here today for shortness of breath. Shortness of Breath  This is a new problem. The current episode started in the past 7 days (1/11/22). The problem occurs constantly. The problem has been gradually worsening. Associated symptoms include a fever (improved now), headaches, rhinorrhea, a sore throat and sputum production. Pertinent negatives include no abdominal pain, chest pain, ear pain, leg swelling, rash or wheezing. The symptoms are aggravated by exercise and lying flat. Associated symptoms comments: Taste and smell are gone. She has tried OTC cough suppressants (tyenol) for the symptoms. The treatment provided mild relief. There is no history of asthma or COPD.          Past Medical History:   Diagnosis Date    Anxiety     Cerebrovascular disease     Mini stroke in 2006    CVA (cerebral infarction)     Facial weakness     left    Family history of colon cancer     Gastroesophageal reflux disease without esophagitis 5/31/2016    History of TIAs     Hypoglycemia     Left hemiparesis ([facility] [address]     Memory problem     Migraine     Sleep difficulties     Speech abnormality     Tobacco abuse     Unspecified sleep apnea           Social History     Tobacco Use    Smoking status: Current Every Day Smoker     Packs/day: 0.50     Years: 20.00     Pack years: 10.00    Smokeless tobacco: Never Used   Substance Use Topics    Alcohol use: No     Alcohol/week: 0.0 standard drinks     Current Outpatient Medications   Medication Sig Dispense Refill  albuterol sulfate HFA (VENTOLIN HFA) 108 (90 Base) MCG/ACT inhaler Inhale 2 puffs into the lungs every 4 hours as needed for Wheezing or Shortness of Breath 18 g 0    Spacer/Aero-Holding Chambers [place] 1 Device by Does not apply route daily as needed (use with inhaler) 1 each 0    benzonatate (TESSALON) 100 MG capsule Take 2 capsules by mouth 3 times daily as needed for Cough 60 capsule 0    celecoxib (CELEBREX) 200 MG capsule Take 1 capsule by mouth daily 90 capsule 0    solifenacin (VESICARE) 5 MG tablet TAKE 1 TABLET DAILY 90 tablet 1    clopidogrel (PLAVIX) 75 MG tablet TAKE 1 TABLET DAILY 90 tablet 1    RABEprazole (ACIPHEX) 20 MG tablet TAKE 1 TABLET DAILY 90 tablet 1    famotidine (PEPCID) 40 MG tablet TAKE 1 TABLET EVERY EVENING 90 tablet 1    Multiple Vitamin (MULTI VITAMIN DAILY PO) Take by mouth      ibuprofen (ADVIL;MOTRIN) 600 MG tablet Take 1 tablet by mouth every 8 hours as needed for Pain 90 tablet 3    mometasone (NASONEX) 50 MCG/ACT nasal spray Two sprays to each nostril once daily. 3 each 0    Cyanocobalamin (VITAMIN B 12 PO) Take by mouth      Cholecalciferol (VITAMIN D3 PO) Take by mouth daily        No current facility-administered medications for this visit. Allergies   Allergen Reactions    Tape [employer] Tape] Rash       Subjective:     Review of Systems   Constitutional: Positive for fatigue and fever (improved now). Negative for activity change, appetite change and chills. HENT: Positive for congestion, postnasal drip, rhinorrhea, sinus pressure and sore throat. Negative for ear pain, sneezing and trouble swallowing. Eyes: Negative for visual disturbance. Respiratory: Positive for sputum production and shortness of breath. Negative for cough, choking, chest tightness and wheezing. Cardiovascular: Negative for chest pain, palpitations and leg swelling. Gastrointestinal: Negative for abdominal pain, constipation, diarrhea and nausea.    Skin: Negative for rash.   Allergic/Immunologic: Negative for environmental allergies. Neurological: Positive for headaches. Objective:      Physical Exam  Vitals and nursing note reviewed. Constitutional:       General: She is not in acute distress. Appearance: She is well-developed. HENT:      Right Ear: Tympanic membrane, ear canal and external ear normal.      Left Ear: Tympanic membrane, ear canal and external ear normal.      Nose: Mucosal edema present. Right Sinus: No maxillary sinus tenderness or frontal sinus tenderness. Left Sinus: No maxillary sinus tenderness or frontal sinus tenderness. Mouth/Throat:      Pharynx: No oropharyngeal exudate. Eyes:      Conjunctiva/sclera: Conjunctivae normal.   Cardiovascular:      Rate and Rhythm: Normal rate and regular rhythm. Heart sounds: Normal heart sounds. No murmur heard. Pulmonary:      Effort: Pulmonary effort is normal. No respiratory distress. Breath sounds: Normal breath sounds. No wheezing or rales. Musculoskeletal:      Cervical back: Normal range of motion and neck supple. Lymphadenopathy:      Cervical: No cervical adenopathy. Skin:     General: Skin is warm and dry. Findings: No rash. Neurological:      Mental Status: She is alert and oriented to person, place, and time. Psychiatric:         Behavior: Behavior normal.         Judgment: Judgment normal.       /74 (Site: Left Upper Arm, Position: Sitting, Cuff Size: Large Adult)   Pulse 74   Temp 98.3 °F (36.8 °C) (Tympanic)   Wt 166 lb (75.3 kg)   SpO2 98% Comment: after walking in ca[place]  BMI 28.49 kg/m²     Assessment:       Diagnosis Orders   1. COVID-19               Plan:        Covid 19: stable; she is doing okay, but she is still short of breath and coughing. I sent in albuterol for her and tessalon to help with the cough. I also recommended flonase, nasal saline and mucinex. Return if symptoms worsen or fail to improve.     Orders Placed This Encounter   Medications    albuterol sulfate HFA (VENTOLIN HFA) 108 (90 Base) MCG/ACT inhaler     Sig: Inhale 2 puffs into the lungs every 4 hours as needed for Wheezing or Shortness of Breath     Dispense:  18 g     Refill:  0    Spacer/Aero-Holding Chambers [place]     Si Device by Does not apply route daily as needed (use with inhaler)     Dispense:  1 each     Refill:  0    benzonatate (TESSALON) 100 MG capsule     Sig: Take 2 capsules by mouth 3 times daily as needed for Cough     Dispense:  60 capsule     Refill:  0       Patientgiven educational materials - see patient instructions. Discussed use, benefit,and side effects of prescribed medications. All patient questions answered. Ptvoiced understanding. Reviewed health maintenance. Instructed to continue currentmedications, diet and exercise. Patient agreed with treatment plan. Follow up asdirected.      Electronically signed by Kvng John MD on 2022 at 1:22 PM

## 2022-01-17 NOTE — TELEPHONE ENCOUNTER
Received call from Christie Sawyer at [facility] with [employer] Complaint. Subjective: Caller states \"I have cough and tightness to the left side of my chest when moving\"     Current Symptoms: productive cough with clear sputum, chest tightness, no taste or smell, nasal congestion  Positive for COVID    Onset: 6 days ago; gradual     Pain Severity: 4/10; squeezing; intermittent    Temperature: None today      What has been tried: Mucinex    Recommended disposition: Go to ED now (or PCP triage). Warm transfer to [facility] with [facility]. Care advice provided, patient verbalizes understanding; denies any other questions or concerns; instructed to call back for any new or worsening symptoms. Attention Provider: Thank you for allowing me to participate in the care of your patient. The patient was connected to triage in response to information provided to the ECC/PSC.   Please do not respond through this encounter as the response is not directed to a shared pool        Reason for Disposition   Taking a deep breath makes pain worse    Protocols used: CHEST PAIN-ADULT-AH

## 2022-02-28 DIAGNOSIS — N32.81 OVERACTIVE BLADDER: ICD-10-CM

## 2022-02-28 DIAGNOSIS — K21.9 LPRD (LARYNGOPHARYNGEAL REFLUX DISEASE): ICD-10-CM

## 2022-02-28 DIAGNOSIS — K21.9 GASTROESOPHAGEAL REFLUX DISEASE, UNSPECIFIED WHETHER ESOPHAGITIS PRESENT: ICD-10-CM

## 2022-02-28 RX ORDER — FAMOTIDINE 40 MG/1
TABLET, FILM COATED ORAL
Qty: 90 TABLET | Refills: 1 | Status: SHIPPED | OUTPATIENT
Start: 2022-02-28 | End: 2022-08-03

## 2022-02-28 RX ORDER — SOLIFENACIN SUCCINATE 5 MG/1
TABLET, FILM COATED ORAL
Qty: 90 TABLET | Refills: 1 | Status: SHIPPED | OUTPATIENT
Start: 2022-02-28 | End: 2022-06-06 | Stop reason: SDUPTHER

## 2022-02-28 RX ORDER — RABEPRAZOLE SODIUM 20 MG/1
TABLET, DELAYED RELEASE ORAL
Qty: 90 TABLET | Refills: 1 | Status: SHIPPED | OUTPATIENT
Start: 2022-02-28 | End: 2022-07-05 | Stop reason: SDUPTHER

## 2022-03-04 DIAGNOSIS — I63.9 CEREBROVASCULAR ACCIDENT (CVA), UNSPECIFIED MECHANISM (HCC): ICD-10-CM

## 2022-03-07 RX ORDER — CLOPIDOGREL BISULFATE 75 MG/1
TABLET ORAL
Qty: 90 TABLET | Refills: 1 | OUTPATIENT
Start: 2022-03-07

## 2022-03-07 NOTE — TELEPHONE ENCOUNTER
Last Appt:  7/1/2021  Next Appt:   Visit date not found  Med verified in Epic  Missed last appt need to schedule and will fill till apppt

## 2022-03-17 DIAGNOSIS — I63.9 CEREBROVASCULAR ACCIDENT (CVA), UNSPECIFIED MECHANISM (HCC): ICD-10-CM

## 2022-03-17 RX ORDER — CLOPIDOGREL BISULFATE 75 MG/1
75 TABLET ORAL DAILY
Qty: 90 TABLET | Refills: 0 | Status: SHIPPED | OUTPATIENT
Start: 2022-03-17 | End: 2022-06-10

## 2022-03-17 NOTE — TELEPHONE ENCOUNTER
Nae Randa denied refill on 3/4/22 due to needing an appt. Appt now scheduled for 4/20/22. Do you want to refill with no additional refills just to get through until appt?

## 2022-03-17 NOTE — TELEPHONE ENCOUNTER
----- Message from Vannie Cooks sent at 3/17/2022  8:41 AM EDT -----  Subject: Message to Provider    QUESTIONS  Information for Provider? pt made appt for 4/20. Can her medication be   refilled until that appt. It is for the clopidogrel (PLAVIX) 75 MG tablet   she had requested before. Going to [employer].   ---------------------------------------------------------------------------  --------------  [place] POST  What is the best way for the office to contact you? OK to leave message on   voicemail  Preferred Call Back Phone Number? [phone]  ---------------------------------------------------------------------------  --------------  SCRIPT ANSWERS  Relationship to Patient?  Self

## 2022-03-26 ENCOUNTER — APPOINTMENT (OUTPATIENT)
Dept: CT IMAGING | Age: 62
End: 2022-03-26
Payer: COMMERCIAL

## 2022-03-26 ENCOUNTER — APPOINTMENT (OUTPATIENT)
Dept: GENERAL RADIOLOGY | Age: 62
End: 2022-03-26
Payer: COMMERCIAL

## 2022-03-26 ENCOUNTER — HOSPITAL ENCOUNTER (INPATIENT)
Age: 62
LOS: 1 days | Discharge: HOME | DRG: 103 | End: 2022-03-27
Attending: EMERGENCY MEDICINE | Admitting: PSYCHIATRY & NEUROLOGY
Payer: COMMERCIAL

## 2022-03-26 ENCOUNTER — HOSPITAL ENCOUNTER (EMERGENCY)
Age: 62
Discharge: ANOTHER HOSPITAL | End: 2022-03-26
Attending: EMERGENCY MEDICINE
Payer: COMMERCIAL

## 2022-03-26 VITALS
HEART RATE: 73 BPM | HEIGHT: 64 IN | RESPIRATION RATE: 14 BRPM | DIASTOLIC BLOOD PRESSURE: 67 MMHG | BODY MASS INDEX: 27.31 KG/M2 | SYSTOLIC BLOOD PRESSURE: 102 MMHG | OXYGEN SATURATION: 96 % | WEIGHT: 160 LBS | TEMPERATURE: 97 F

## 2022-03-26 DIAGNOSIS — R51.9 ACUTE NONINTRACTABLE HEADACHE, UNSPECIFIED HEADACHE TYPE: Primary | ICD-10-CM

## 2022-03-26 DIAGNOSIS — R74.8 ELEVATED LIVER ENZYMES: ICD-10-CM

## 2022-03-26 DIAGNOSIS — I63.9 ACUTE EMBOLIC STROKE (HCC): Primary | ICD-10-CM

## 2022-03-26 LAB
ABSOLUTE EOS #: 0.12 K/UL (ref 0–0.44)
ABSOLUTE EOS #: 0.17 K/UL (ref 0–0.44)
ABSOLUTE IMMATURE GRANULOCYTE: <0.03 K/UL (ref 0–0.3)
ABSOLUTE IMMATURE GRANULOCYTE: <0.03 K/UL (ref 0–0.3)
ABSOLUTE LYMPH #: 2.13 K/UL (ref 1.1–3.7)
ABSOLUTE LYMPH #: 3.4 K/UL (ref 1.1–3.7)
ABSOLUTE MONO #: 0.47 K/UL (ref 0.1–1.2)
ABSOLUTE MONO #: 0.51 K/UL (ref 0.1–1.2)
ALBUMIN SERPL-MCNC: 4.5 G/DL (ref 3.5–5.2)
ALBUMIN/GLOBULIN RATIO: 1.7 (ref 1–2.5)
ALP BLD-CCNC: 107 U/L (ref 35–104)
ALT SERPL-CCNC: 226 U/L (ref 5–33)
ANION GAP SERPL CALCULATED.3IONS-SCNC: 12 MMOL/L (ref 9–17)
ANION GAP SERPL CALCULATED.3IONS-SCNC: 9 MMOL/L (ref 9–17)
AST SERPL-CCNC: 106 U/L
BASOPHILS # BLD: 1 % (ref 0–2)
BASOPHILS # BLD: 1 % (ref 0–2)
BASOPHILS ABSOLUTE: 0.07 K/UL (ref 0–0.2)
BASOPHILS ABSOLUTE: 0.08 K/UL (ref 0–0.2)
BILIRUB SERPL-MCNC: 0.22 MG/DL (ref 0.3–1.2)
BUN BLDV-MCNC: 12 MG/DL (ref 6–20)
BUN BLDV-MCNC: 15 MG/DL (ref 6–20)
BUN/CREAT BLD: 27 (ref 9–20)
CALCIUM SERPL-MCNC: 9.2 MG/DL (ref 8.6–10.4)
CALCIUM SERPL-MCNC: 9.2 MG/DL (ref 8.6–10.4)
CHLORIDE BLD-SCNC: 101 MMOL/L (ref 98–107)
CHLORIDE BLD-SCNC: 101 MMOL/L (ref 98–107)
CHP ED QC CHECK: NORMAL
CO2: 23 MMOL/L (ref 20–31)
CO2: 26 MMOL/L (ref 20–31)
CREAT SERPL-MCNC: 0.55 MG/DL (ref 0.5–0.9)
CREAT SERPL-MCNC: 0.58 MG/DL (ref 0.5–0.9)
EOSINOPHILS RELATIVE PERCENT: 2 % (ref 1–4)
EOSINOPHILS RELATIVE PERCENT: 2 % (ref 1–4)
GFR AFRICAN AMERICAN: >60 ML/MIN
GFR AFRICAN AMERICAN: >60 ML/MIN
GFR NON-AFRICAN AMERICAN: >60 ML/MIN
GFR NON-AFRICAN AMERICAN: >60 ML/MIN
GFR SERPL CREATININE-BSD FRML MDRD: ABNORMAL ML/MIN/{1.73_M2}
GFR SERPL CREATININE-BSD FRML MDRD: ABNORMAL ML/MIN/{1.73_M2}
GLUCOSE BLD-MCNC: 75 MG/DL (ref 70–99)
GLUCOSE BLD-MCNC: 83 MG/DL
GLUCOSE BLD-MCNC: 83 MG/DL (ref 65–105)
GLUCOSE BLD-MCNC: 95 MG/DL (ref 70–99)
HCT VFR BLD CALC: 42.6 % (ref 36.3–47.1)
HCT VFR BLD CALC: 44.7 % (ref 36.3–47.1)
HEMOGLOBIN: 14.1 G/DL (ref 11.9–15.1)
HEMOGLOBIN: 14.9 G/DL (ref 11.9–15.1)
IMMATURE GRANULOCYTES: 0 %
IMMATURE GRANULOCYTES: 0 %
INR BLD: 1
INR BLD: 1
LYMPHOCYTES # BLD: 30 % (ref 24–43)
LYMPHOCYTES # BLD: 46 % (ref 24–43)
MCH RBC QN AUTO: 30.4 PG (ref 25.2–33.5)
MCH RBC QN AUTO: 30.5 PG (ref 25.2–33.5)
MCHC RBC AUTO-ENTMCNC: 33.1 G/DL (ref 28.4–34.8)
MCHC RBC AUTO-ENTMCNC: 33.3 G/DL (ref 25.2–33.5)
MCV RBC AUTO: 91.4 FL (ref 82.6–102.9)
MCV RBC AUTO: 91.8 FL (ref 82.6–102.9)
MONOCYTES # BLD: 6 % (ref 3–12)
MONOCYTES # BLD: 7 % (ref 3–12)
MYOGLOBIN: 27 NG/ML (ref 25–58)
NRBC AUTOMATED: 0 PER 100 WBC
NRBC AUTOMATED: 0 PER 100 WBC
PARTIAL THROMBOPLASTIN TIME: 23.9 SEC (ref 20.5–30.5)
PARTIAL THROMBOPLASTIN TIME: 24.7 SEC (ref 23.9–33.8)
PDW BLD-RTO: 13.7 % (ref 11.8–14.4)
PDW BLD-RTO: 14 % (ref 11.8–14.4)
PLATELET # BLD: 296 K/UL (ref 138–453)
PLATELET # BLD: 308 K/UL (ref 138–453)
PMV BLD AUTO: 8.9 FL (ref 8.1–13.5)
PMV BLD AUTO: 9.5 FL (ref 8.1–13.5)
POTASSIUM SERPL-SCNC: 4.1 MMOL/L (ref 3.7–5.3)
POTASSIUM SERPL-SCNC: 4.2 MMOL/L (ref 3.7–5.3)
PRO-BNP: 60 PG/ML
PROTHROMBIN TIME: 10.4 SEC (ref 9.1–12.3)
PROTHROMBIN TIME: 13 SEC (ref 11.5–14.2)
RBC # BLD: 4.64 M/UL (ref 3.95–5.11)
RBC # BLD: 4.89 M/UL (ref 3.95–5.11)
SEG NEUTROPHILS: 45 % (ref 36–65)
SEG NEUTROPHILS: 60 % (ref 36–65)
SEGMENTED NEUTROPHILS ABSOLUTE COUNT: 3.42 K/UL (ref 1.5–8.1)
SEGMENTED NEUTROPHILS ABSOLUTE COUNT: 4.31 K/UL (ref 1.5–8.1)
SODIUM BLD-SCNC: 136 MMOL/L (ref 135–144)
SODIUM BLD-SCNC: 136 MMOL/L (ref 135–144)
TOTAL CK: 45 U/L (ref 26–192)
TOTAL PROTEIN: 7.1 G/DL (ref 6.4–8.3)
TROPONIN, HIGH SENSITIVITY: <6 NG/L (ref 0–14)
TROPONIN, HIGH SENSITIVITY: <6 NG/L (ref 0–14)
WBC # BLD: 7.2 K/UL (ref 3.5–11.3)
WBC # BLD: 7.6 K/UL (ref 3.5–11.3)

## 2022-03-26 PROCEDURE — 85025 COMPLETE CBC W/AUTO DIFF WBC: CPT

## 2022-03-26 PROCEDURE — 93005 ELECTROCARDIOGRAM TRACING: CPT | Performed by: PSYCHIATRY & NEUROLOGY

## 2022-03-26 PROCEDURE — 84484 ASSAY OF TROPONIN QUANT: CPT

## 2022-03-26 PROCEDURE — 99285 EMERGENCY DEPT VISIT HI MDM: CPT

## 2022-03-26 PROCEDURE — 70450 CT HEAD/BRAIN W/O DYE: CPT

## 2022-03-26 PROCEDURE — 96374 THER/PROPH/DIAG INJ IV PUSH: CPT

## 2022-03-26 PROCEDURE — 85610 PROTHROMBIN TIME: CPT

## 2022-03-26 PROCEDURE — 93005 ELECTROCARDIOGRAM TRACING: CPT | Performed by: EMERGENCY MEDICINE

## 2022-03-26 PROCEDURE — 83880 ASSAY OF NATRIURETIC PEPTIDE: CPT

## 2022-03-26 PROCEDURE — 71045 X-RAY EXAM CHEST 1 VIEW: CPT

## 2022-03-26 PROCEDURE — 99223 1ST HOSP IP/OBS HIGH 75: CPT | Performed by: PSYCHIATRY & NEUROLOGY

## 2022-03-26 PROCEDURE — 85730 THROMBOPLASTIN TIME PARTIAL: CPT

## 2022-03-26 PROCEDURE — 99284 EMERGENCY DEPT VISIT MOD MDM: CPT

## 2022-03-26 PROCEDURE — 70498 CT ANGIOGRAPHY NECK: CPT

## 2022-03-26 PROCEDURE — 1200000000 HC SEMI PRIVATE

## 2022-03-26 PROCEDURE — 82550 ASSAY OF CK (CPK): CPT

## 2022-03-26 PROCEDURE — 6360000002 HC RX W HCPCS: Performed by: STUDENT IN AN ORGANIZED HEALTH CARE EDUCATION/TRAINING PROGRAM

## 2022-03-26 PROCEDURE — 2580000003 HC RX 258: Performed by: STUDENT IN AN ORGANIZED HEALTH CARE EDUCATION/TRAINING PROGRAM

## 2022-03-26 PROCEDURE — 80048 BASIC METABOLIC PNL TOTAL CA: CPT

## 2022-03-26 PROCEDURE — 80053 COMPREHEN METABOLIC PANEL: CPT

## 2022-03-26 PROCEDURE — 6360000004 HC RX CONTRAST MEDICATION: Performed by: EMERGENCY MEDICINE

## 2022-03-26 PROCEDURE — 82947 ASSAY GLUCOSE BLOOD QUANT: CPT

## 2022-03-26 PROCEDURE — 6370000000 HC RX 637 (ALT 250 FOR IP): Performed by: EMERGENCY MEDICINE

## 2022-03-26 PROCEDURE — 83874 ASSAY OF MYOGLOBIN: CPT

## 2022-03-26 PROCEDURE — 82553 CREATINE MB FRACTION: CPT

## 2022-03-26 PROCEDURE — 96375 TX/PRO/DX INJ NEW DRUG ADDON: CPT

## 2022-03-26 PROCEDURE — 6360000002 HC RX W HCPCS: Performed by: EMERGENCY MEDICINE

## 2022-03-26 RX ORDER — METHYLPREDNISOLONE SODIUM SUCCINATE 125 MG/2ML
125 INJECTION, POWDER, LYOPHILIZED, FOR SOLUTION INTRAMUSCULAR; INTRAVENOUS ONCE
Status: COMPLETED | OUTPATIENT
Start: 2022-03-26 | End: 2022-03-26

## 2022-03-26 RX ORDER — POLYETHYLENE GLYCOL 3350 17 G/17G
17 POWDER, FOR SOLUTION ORAL DAILY PRN
Status: DISCONTINUED | OUTPATIENT
Start: 2022-03-26 | End: 2022-03-27 | Stop reason: HOSPADM

## 2022-03-26 RX ORDER — ACETAMINOPHEN 500 MG
500 TABLET ORAL EVERY 6 HOURS PRN
COMMUNITY

## 2022-03-26 RX ORDER — SODIUM CHLORIDE 0.9 % (FLUSH) 0.9 %
5-40 SYRINGE (ML) INJECTION PRN
Status: DISCONTINUED | OUTPATIENT
Start: 2022-03-26 | End: 2022-03-27 | Stop reason: HOSPADM

## 2022-03-26 RX ORDER — DIPHENHYDRAMINE HYDROCHLORIDE 50 MG/ML
25 INJECTION INTRAMUSCULAR; INTRAVENOUS ONCE
Status: COMPLETED | OUTPATIENT
Start: 2022-03-26 | End: 2022-03-26

## 2022-03-26 RX ORDER — CLOPIDOGREL BISULFATE 75 MG/1
75 TABLET ORAL DAILY
Status: DISCONTINUED | OUTPATIENT
Start: 2022-03-27 | End: 2022-03-27 | Stop reason: HOSPADM

## 2022-03-26 RX ORDER — ACETAMINOPHEN 650 MG/1
650 SUPPOSITORY RECTAL EVERY 6 HOURS PRN
Status: DISCONTINUED | OUTPATIENT
Start: 2022-03-26 | End: 2022-03-27 | Stop reason: HOSPADM

## 2022-03-26 RX ORDER — SODIUM CHLORIDE 9 MG/ML
25 INJECTION, SOLUTION INTRAVENOUS PRN
Status: DISCONTINUED | OUTPATIENT
Start: 2022-03-26 | End: 2022-03-27 | Stop reason: HOSPADM

## 2022-03-26 RX ORDER — ACETAMINOPHEN 500 MG
1000 TABLET ORAL ONCE
Status: COMPLETED | OUTPATIENT
Start: 2022-03-26 | End: 2022-03-26

## 2022-03-26 RX ORDER — ONDANSETRON 2 MG/ML
4 INJECTION INTRAMUSCULAR; INTRAVENOUS EVERY 6 HOURS PRN
Status: DISCONTINUED | OUTPATIENT
Start: 2022-03-26 | End: 2022-03-27 | Stop reason: HOSPADM

## 2022-03-26 RX ORDER — SODIUM CHLORIDE 0.9 % (FLUSH) 0.9 %
5-40 SYRINGE (ML) INJECTION EVERY 12 HOURS SCHEDULED
Status: DISCONTINUED | OUTPATIENT
Start: 2022-03-26 | End: 2022-03-27 | Stop reason: HOSPADM

## 2022-03-26 RX ORDER — ALBUTEROL SULFATE 90 UG/1
2 AEROSOL, METERED RESPIRATORY (INHALATION) EVERY 4 HOURS PRN
Status: DISCONTINUED | OUTPATIENT
Start: 2022-03-26 | End: 2022-03-27 | Stop reason: HOSPADM

## 2022-03-26 RX ORDER — ACETAMINOPHEN 325 MG/1
650 TABLET ORAL EVERY 6 HOURS PRN
Status: DISCONTINUED | OUTPATIENT
Start: 2022-03-26 | End: 2022-03-27 | Stop reason: HOSPADM

## 2022-03-26 RX ORDER — 0.9 % SODIUM CHLORIDE 0.9 %
500 INTRAVENOUS SOLUTION INTRAVENOUS ONCE
Status: COMPLETED | OUTPATIENT
Start: 2022-03-26 | End: 2022-03-27

## 2022-03-26 RX ORDER — KETOROLAC TROMETHAMINE 15 MG/ML
15 INJECTION, SOLUTION INTRAMUSCULAR; INTRAVENOUS ONCE
Status: COMPLETED | OUTPATIENT
Start: 2022-03-26 | End: 2022-03-26

## 2022-03-26 RX ORDER — ONDANSETRON 4 MG/1
4 TABLET, ORALLY DISINTEGRATING ORAL EVERY 8 HOURS PRN
Status: DISCONTINUED | OUTPATIENT
Start: 2022-03-26 | End: 2022-03-27 | Stop reason: HOSPADM

## 2022-03-26 RX ORDER — KETOROLAC TROMETHAMINE 30 MG/ML
30 INJECTION, SOLUTION INTRAMUSCULAR; INTRAVENOUS ONCE
Status: COMPLETED | OUTPATIENT
Start: 2022-03-26 | End: 2022-03-26

## 2022-03-26 RX ADMIN — METHYLPREDNISOLONE SODIUM SUCCINATE 125 MG: 125 INJECTION, POWDER, FOR SOLUTION INTRAMUSCULAR; INTRAVENOUS at 23:44

## 2022-03-26 RX ADMIN — ACETAMINOPHEN 1000 MG: 500 TABLET, FILM COATED ORAL at 17:18

## 2022-03-26 RX ADMIN — SODIUM CHLORIDE 500 ML: 9 INJECTION, SOLUTION INTRAVENOUS at 23:28

## 2022-03-26 RX ADMIN — KETOROLAC TROMETHAMINE 30 MG: 30 INJECTION, SOLUTION INTRAMUSCULAR; INTRAVENOUS at 13:53

## 2022-03-26 RX ADMIN — KETOROLAC TROMETHAMINE 15 MG: 15 INJECTION, SOLUTION INTRAMUSCULAR; INTRAVENOUS at 23:32

## 2022-03-26 RX ADMIN — IOPAMIDOL 75 ML: 755 INJECTION, SOLUTION INTRAVENOUS at 14:21

## 2022-03-26 RX ADMIN — DIPHENHYDRAMINE HYDROCHLORIDE 25 MG: 50 INJECTION, SOLUTION INTRAMUSCULAR; INTRAVENOUS at 23:32

## 2022-03-26 RX ADMIN — DIPHENHYDRAMINE HYDROCHLORIDE 25 MG: 50 INJECTION, SOLUTION INTRAMUSCULAR; INTRAVENOUS at 17:18

## 2022-03-26 RX ADMIN — SODIUM CHLORIDE, PRESERVATIVE FREE 10 ML: 5 INJECTION INTRAVENOUS at 23:29

## 2022-03-26 ASSESSMENT — PAIN SCALES - GENERAL
PAINLEVEL_OUTOF10: 7
PAINLEVEL_OUTOF10: 8
PAINLEVEL_OUTOF10: 10
PAINLEVEL_OUTOF10: 6
PAINLEVEL_OUTOF10: 5
PAINLEVEL_OUTOF10: 10
PAINLEVEL_OUTOF10: 6

## 2022-03-26 ASSESSMENT — PAIN DESCRIPTION - PROGRESSION
CLINICAL_PROGRESSION: GRADUALLY IMPROVING
CLINICAL_PROGRESSION: GRADUALLY IMPROVING

## 2022-03-26 ASSESSMENT — PAIN DESCRIPTION - LOCATION
LOCATION: HEAD
LOCATION: HEAD

## 2022-03-26 ASSESSMENT — PAIN - FUNCTIONAL ASSESSMENT
PAIN_FUNCTIONAL_ASSESSMENT: 0-10
PAIN_FUNCTIONAL_ASSESSMENT: 0-10

## 2022-03-26 ASSESSMENT — PAIN DESCRIPTION - ONSET: ONSET: AWAKENED FROM SLEEP

## 2022-03-26 NOTE — VIRTUAL HEALTH
Consults  Patient Location:  [address] [place] [address] ED    Provider Location (City/State): [address] [place] [address] Stroke and Vascular Neurology Consult for  [place] Stroke Alert through [address] @ 1:38 pm   3/26/2022 1:50 PM  Pt Name: Júnior Esquivel  MRN: [id number]  [place]: 1960  Date of evaluation: 3/26/2022  Primary Care Physician: RUSSELL Christina  Reason for Evaluation: Stroke evaluation with Phone Consult, Discussion and Review of imaging    Júnior Esquivel is a 63 y.o. female with pmh significant for previous stroke,  GERD, left hemiparesis and facial weakness presented with acute onset worsening headaches. Onset of headache 12:30 am. In Er, patient was noted to have left facial droop and left arm and left leg weakness, which according to note was chronic. Therefore stroke alert was called. LKW: 12:30 am   NIH:  4    Allergies  is allergic to tape [adhesive tape]. Medications  Prior to Admission medications    Medication Sig Start Date End Date Taking?  Authorizing Provider   clopidogrel (PLAVIX) 75 MG tablet Take 1 tablet by mouth daily 3/17/22   Marlene Benoit   RABEprazole (ACIPHEX) 20 MG tablet TAKE 1 TABLET DAILY 2/28/22   RUSSELL Benoit   famotidine (PEPCID) 40 MG tablet TAKE 1 TABLET EVERY EVENING 2/28/22   RUSSELL Benoit   solifenacin (VESICARE) 5 MG tablet TAKE 1 TABLET DAILY 2/28/22   RUSSELL Benoit   albuterol sulfate HFA (VENTOLIN HFA) 108 (90 Base) MCG/ACT inhaler Inhale 2 puffs into the lungs every 4 hours as needed for Wheezing or Shortness of Breath 1/17/22   Kvng John MD   Spacer/Aero-Holding [place] 1 Device by Does not apply route daily as needed (use with inhaler) 1/17/22   Kvng John MD   celecoxib (CELEBREX) 200 MG capsule Take 1 capsule by mouth daily 12/2/21   RUSSELL Benoit   Multiple Vitamin (MULTI VITAMIN DAILY PO) Take by mouth    Historical Provider, MD   ibuprofen (ADVIL;MOTRIN) 600 MG tablet Take 1 tablet by mouth every 8 hours as needed for Pain 6/25/21   Liliya Reed MD   mometasone (NASONEX) 50 MCG/ACT nasal spray Two sprays to each nostril once daily. 6/15/21   RUSSELL Douglas   Cyanocobalamin (VITAMIN B 12 PO) Take by mouth    Historical Provider, MD   Cholecalciferol (VITAMIN D3 PO) Take by mouth daily     Historical Provider, MD    Scheduled Meds:   ketorolac  30 mg IntraVENous Once     Continuous Infusions:  PRN Meds:.  Past Medical History   has a past medical history of Anxiety, Cerebrovascular disease, CVA (cerebral infarction), Facial weakness, Family history of colon cancer, Gastroesophageal reflux disease without esophagitis, History of TIAs, Hypoglycemia, Left hemiparesis ([facility] [address] Memory problem, Migraine, Sleep difficulties, Speech abnormality, Tobacco abuse, and Unspecified sleep apnea. Social History  Social History     Socioeconomic History    Marital status:      Spouse name: Not on file    Number of children: Not on file    Years of education: Not on file    Highest education level: Not on file   Occupational History     Employer: NONE   Tobacco Use    Smoking status: Current Every Day Smoker     Packs/day: 0.50     Years: 20.00     Pack years: 10.00    Smokeless tobacco: Never Used   Vaping Use    Vaping Use: Never used   Substance and Sexual Activity    Alcohol use: No     Alcohol/week: 0.0 standard drinks    Drug use: No    Sexual activity: Not on file   Other Topics Concern    Not on file   Social History Narrative    Not on file     Social Determinants of Health     Financial Resource Strain: Low Risk     Difficulty of Paying Living Expenses: Not hard at all   Food Insecurity: No Food Insecurity    Worried About Running Out of Food in the Last Year: Never true    [place] of Food in the Last Year: Never true   Transportation Needs:     Lack of Transportation (Medical):  Not on file    Lack of Transportation (Non-Medical): Not on file   Physical Activity:     Days of Exercise per Week: Not on file    Minutes of Exercise per Session: Not on file   Stress:     Feeling of Stress : Not on file   Social Connections:     Frequency of Communication with Friends and Family: Not on file    Frequency of Social Gatherings with Friends and Family: Not on file    Attends [employer] Services: Not on file    Active Member of Clubs or Organizations: Not on file    Attends Club or Organization Meetings: Not on file    Marital Status: Not on file   Intimate Partner Violence:     Fear of Current or Ex-Partner: Not on file    Emotionally Abused: Not on file    Physically Abused: Not on file    Sexually Abused: Not on file   Housing Stability:     Unable to Pay for Housing in the Last Year: Not on file    Number of [place] in the Last Year: Not on file    Unstable Housing in the Last Year: Not on file     Family History      Problem Relation Age of Onset    Diabetes Mother     Heart Disease Father         afib    High Blood Pressure Father     Diabetes Maternal Grandmother     Cancer Maternal Grandmother         breast    Cancer Maternal Grandfather         brain    Heart Disease Paternal Grandfather     Diabetes Sister     Thyroid Disease Sister     Mental Retardation Sister     Cancer Brother         colon, prostate, lung    Diabetes Brother     Diabetes Brother     Other Brother         hyperglycemia    Heart Disease Brother         atrial fibrillation    Other Other         Jack Syndrome/Jack Syndrome    Other Grandchild         2nd Grandson with Hunters Syndrome       OBJECTIVE  BP (!) 155/81   Pulse 74   Temp 97 °F (36.1 °C) (Tympanic)   Resp 16   Ht 5' 4\" (1.626 m)   Wt 160 lb (72.6 kg)   SpO2 100%   BMI 27.46 kg/m²        Pre-Morbid mRS: 0    Imaging:  Images were personally reviewed with MARITA GONZALEZ used to review images including:  CT brain without contrast: No evidence of any acute intracranial abnormality. CTA imaging: Pending     Assessment   63 y.o. female with pmh significant for previous stroke,  GERD, left hemiparesis and facial weakness presented with acute onset worsening headaches. Onset of headache 12:30 am. In ER, patient was noted to have left facial droop and left arm and left leg weakness, which according to pmh was chronic. SBP was 150s  CT head was personally reviewed showed chronic left basal ganglia stroke, no evidence of SAH        Recommendations:  --Stat CTA head and neck  --Permissive hypertension   --Headache management  --will decide about transfer CTA head and neck completed    Addendum:   CTA head and neck was completed, no evidence of intracranial LVO or aneurysm. According to patient left facial droop is new. Recommend to transfer to [address] [place] [address] for MRI brain. Discussed with ED Physician    At least 28 min of Telemedicine and time in conversation directly with ED staff and physician for the patient who is in imminent and life threatening deterioration without further treatment and evaluation. This Virtual Visit was conducted with patient's (and/or legal guardian's) consent, to provide telestroke consultation and necessary medical care. Time spent examining patient, reviewing the images personally, reviewing the chart, perform high complexity decision making and speaking with the nursing staff regarding recommendations    This is a Phone Consult, I have not seen the patient face to face,     Soraya Hale MD  Stroke, Neurocritical Care And/or [address] [place] [address] Stroke [address]  Electronically signed 3/26/2022 at 1:50 PM      This virtual visit was conducted via interactive/real-time audio/video.

## 2022-03-26 NOTE — LETTER
[facility]  [address] [place] [place] [address]  Phone: [phone]    No name on file. March 27, 2022     Patient: Thu Blair   YOB: 1960   Date of Visit: 3/26/2022       To Whom It May Concern: It is my medical opinion that Mono Hardin may return to work on April 4th / 2022. It's recommended for her to rest between March 27 / 2022 and the date provided above. If you have any questions or concerns, please don't hesitate to call. Sincerely        No name on file.    Sarmad Kc MD  3/27/22

## 2022-03-26 NOTE — ED PROVIDER NOTES
[facility] ED  [address]  [place] [address]  Phone: [phone]        Pt Name: Italo Craft  MRN: [id number]  [place] 1960  Date of evaluation: 3/26/22      CHIEF COMPLAINT     Chief Complaint   Patient presents with    Headache     Pt c/o headache waking her from sleep at 0030 today. Denies fall or injury. States was driving today and felt like her left eyelid wouldn't stay open. Mask removed from face and visible left sided facial droop noted. Pt has weakness to left arm and leg as well. Pt states has had a TIA in the past.    Facial Droop         HISTORY OF PRESENT ILLNESS  (Location/Symptom, Timing/Onset, Context/Setting, Quality, Duration, Modifying Factors, Severity.)    Italo Craft is a 63 y.o. female who presents with headache, left facial droop, left-sided weakness. Patient has a history of CVA and TIA with some mild left-sided weakness. She also has a history of migraines. She states she thought she was having a migraine headache last night at 00 30. She noticed today when she was driving that she was having difficulty keeping her left eye open. When she presented to the emergency department she had a left facial droop and weakness left arm and left leg with some slightly slurred speech. She was not aware of the symptoms and so was not able to tell me when the symptoms started. The only timeframe we do know if she developed a headache at 00 30 last night. She denies having any dizziness. She denies any neck pain. She denies any fever or chills. Denies any cough congestion or sore throat. She denies any chest pain or shortness of breath. She denies any abdominal pain nausea or vomiting diarrhea or constipation. She denies any dysuria or hematuria. Patient's Accu-Chek was 83. Stroke alert was called at 1324.       REVIEW OF SYSTEMS    (2-9 systems for level 4, 10 or more for level 5)     Review of Systems systems reviewed and are negative except was present in the HPI    [address]    has a past medical history of Anxiety, Cerebrovascular disease, CVA (cerebral infarction), Facial weakness, Family history of colon cancer, Gastroesophageal reflux disease without esophagitis, History of TIAs, Hypoglycemia, Left hemiparesis ([address] Memory problem, Migraine, Sleep difficulties, Speech abnormality, Tobacco abuse, and Unspecified sleep apnea. SURGICAL HISTORY      has a past surgical history that includes Appendectomy; Hysterectomy; Ovarian cyst surgery; Tonsillectomy; Colonoscopy (2015); Cardiac catheterization (7-2-15); Foot surgery (Right); and Cholecystectomy. CURRENTMEDICATIONS       Previous Medications    ALBUTEROL SULFATE HFA (VENTOLIN HFA) 108 (90 BASE) MCG/ACT INHALER    Inhale 2 puffs into the lungs every 4 hours as needed for Wheezing or Shortness of Breath    CELECOXIB (CELEBREX) 200 MG CAPSULE    Take 1 capsule by mouth daily    CHOLECALCIFEROL (VITAMIN D3 PO)    Take by mouth daily     CLOPIDOGREL (PLAVIX) 75 MG TABLET    Take 1 tablet by mouth daily    CYANOCOBALAMIN (VITAMIN B 12 PO)    Take by mouth    FAMOTIDINE (PEPCID) 40 MG TABLET    TAKE 1 TABLET EVERY EVENING    IBUPROFEN (ADVIL;MOTRIN) 600 MG TABLET    Take 1 tablet by mouth every 8 hours as needed for Pain    MOMETASONE (NASONEX) 50 MCG/ACT NASAL SPRAY    Two sprays to each nostril once daily. MULTIPLE VITAMIN (MULTI VITAMIN DAILY PO)    Take by mouth    RABEPRAZOLE (ACIPHEX) 20 MG TABLET    TAKE 1 TABLET DAILY    SOLIFENACIN (VESICARE) 5 MG TABLET    TAKE 1 TABLET DAILY    SPACER/AERO-HOLDING CHAMBERS [place]    1 Device by Does not apply route daily as needed (use with inhaler)       ALLERGIES     is allergic to tape [adhesive tape]. FAMILY HISTORY     She indicated that her mother is alive. She indicated that her father is alive. She indicated that her sister is . She indicated that four of her five brothers are alive.  She indicated that her maternal grandmother is . She indicated that her maternal grandfather is . She indicated that her paternal grandmother is . She indicated that her paternal grandfather is . She indicated that all of her three daughters are alive. She indicated that both of her grandchildren are alive. She indicated that her other is alive. family history includes Cancer in her brother, maternal grandfather, and maternal grandmother; Diabetes in her brother, brother, maternal grandmother, mother, and sister; Heart Disease in her brother, father, and paternal grandfather; High Blood Pressure in her father; Mental Retardation in her sister; Other in her brother, grandchild, and another family member; Thyroid Disease in her sister. SOCIAL HISTORY      reports that she has been smoking. She has a 10.00 pack-year smoking history. She has never used smokeless tobacco. She reports that she does not drink alcohol and does not use drugs. PHYSICAL EXAM    (up to 7 for level 4, 8 or more for level 5)   INITIAL VITALS:  height is 5' 4\" (1.626 m) and weight is 160 lb (72.6 kg). Her tympanic temperature is 97 °F (36.1 °C). Her blood pressure is 155/81 (abnormal) and her pulse is 74. Her respiration is 16 and oxygen saturation is 100%. Physical Exam  Vitals reviewed. HENT:      Head: Normocephalic and atraumatic. Comments: Patient has a left facial droop. Does not include the forehead     Mouth/Throat:      Mouth: Mucous membranes are moist.      Pharynx: Oropharynx is clear. Uvula midline. No pharyngeal swelling, oropharyngeal exudate, posterior oropharyngeal erythema or uvula swelling. Comments: Tongue deviates to the left when she protrudes her tongue  Eyes:      General: No visual field deficit. Extraocular Movements: Extraocular movements intact. Right eye: Normal extraocular motion and no nystagmus. Left eye: Normal extraocular motion and no nystagmus.       Conjunctiva/sclera: Conjunctivae normal.      Comments: Drooping of the left upper lid   Neck:      Vascular: No carotid bruit. Cardiovascular:      Rate and Rhythm: Normal rate and regular rhythm. Pulses: Normal pulses. Heart sounds: Normal heart sounds. Pulmonary:      Effort: Pulmonary effort is normal. No tachypnea, bradypnea, accessory muscle usage, prolonged expiration, respiratory distress or retractions. Breath sounds: Normal breath sounds and air entry. Abdominal:      General: Bowel sounds are normal.      Tenderness: There is no abdominal tenderness. There is no right CVA tenderness, left CVA tenderness, guarding or rebound. Negative signs include Epps's sign, Rovsing's sign, McBurney's sign, psoas sign and obturator sign. Musculoskeletal:      Cervical back: Normal range of motion and neck supple. No signs of trauma. No pain with movement, spinous process tenderness or muscular tenderness. Normal range of motion. Right lower leg: No edema. Left lower leg: No edema. Lymphadenopathy:      Cervical: No cervical adenopathy. Right cervical: No superficial, deep or posterior cervical adenopathy. Left cervical: No superficial, deep or posterior cervical adenopathy. Skin:     General: Skin is warm. Capillary Refill: Capillary refill takes less than 2 seconds. Findings: No rash. Neurological:      Mental Status: She is alert. GCS: GCS eye subscore is 4. GCS verbal subscore is 5. GCS motor subscore is 6. Cranial Nerves: Dysarthria and facial asymmetry present. No cranial nerve deficit. Sensory: Sensation is intact. Motor: Weakness and pronator drift present. No tremor, atrophy, abnormal muscle tone or seizure activity. Coordination: Coordination is intact. Gait: Gait is intact. Comments: Left facial droop. Patient has normal sensation left and right side of the face. When she protrudes her tongue deviates to the left.   She has slight slurred or hesitation when she speaks. Patient has a left pronator drift. She has drifting of the left lower extremity when she raises it off of the bed. Slightly weaker handgrip on the left compared to the right. Slightly weaker dorsiflexion and plantarflexion left ankle compared to the right. Normal sensation bilateral.  NIH score of 5. Psychiatric:         Attention and Perception: Attention normal.         Mood and Affect: Mood normal.         Speech: Speech is slurred. Behavior: Behavior normal.         Thought Content: Thought content normal.         Cognition and Memory: Cognition normal.         DIFFERENTIAL DIAGNOSIS/ MDM:     Acute stroke versus intracerebral bleed. Stroke alert called. DIAGNOSTIC RESULTS     EKG: All EKG's are interpreted by the Emergency Department Physician who either signs or Co-signs this chart in the absence of a cardiologist.      Interpreted by Rosalinda Avina DO     Rhythm: normal sinus   Rate: normal  Axis: normal  Ectopy: none  Conduction: normal  ST Segments: no acute change  T Waves: no acute change  Q Waves: none    Clinical Impression: normal sinus rhythm with no acute changes/normal EKG. No acute infarction/ischemia noted. RADIOLOGY:        Interpretation per the Radiologist below, if available at the time of this note:      CT HEAD WO CONTRAST    Result Date: 3/26/2022  EXAMINATION: CT OF THE HEAD WITHOUT CONTRAST  3/26/2022 10:30 am TECHNIQUE: CT of the head was performed without the administration of intravenous contrast. Dose modulation, iterative reconstruction, and/or weight based adjustment of the mA/kV was utilized to reduce the radiation dose to as low as reasonably achievable.  COMPARISON: 10/27/2019 HISTORY: ORDERING SYSTEM PROVIDED HISTORY: facial droop TECHNOLOGIST PROVIDED HISTORY: facial droop Decision Support Exception - unselect if not a suspected or confirmed emergency medical condition->Emergency Medical Condition (MA) Reason for Exam: New onset left facial droop, history of TIA FINDINGS: BRAIN/VENTRICLES: There is no acute intracranial hemorrhage, mass effect or midline shift. No abnormal extra-axial fluid collection. The gray-white differentiation is maintained without evidence of an acute infarct. There is no evidence of hydrocephalus. ORBITS: The visualized portion of the orbits demonstrate no acute abnormality. SINUSES: The visualized paranasal sinuses and mastoid air cells demonstrate no acute abnormality. SOFT TISSUES/SKULL:  No acute abnormality of the visualized skull or soft tissues. No acute intracranial abnormality. The findings were sent to the Radiology Results [address] at 1:44 am on 3/26/2022to be communicated to a licensed caregiver. XR CHEST PORTABLE    Result Date: 3/26/2022  EXAM: XR Chest, 1 View EXAM DATE/TIME: 3/26/2022 1:37 pm CLINICAL HISTORY: ORDERING SYSTEM PROVIDED  Stroke  TECHNOLOGIST PROVIDED HISTORY:  Stroke Reason for Exam: Possible stroke, headache, facial droop TECHNIQUE: Frontal view of the chest. COMPARISON: 09/29/2015 FINDINGS: Lungs:  No acute findings. No consolidation. Pleural space:  No acute findings. No pneumothorax. Heart:  No acute findings. No cardiomegaly. Mediastinum:  No acute findings. Bones/joints:  No acute findings. No acute findings in the chest.     CTA HEAD NECK W CONTRAST    Result Date: 3/26/2022  EXAMINATION: CTA OF THE HEAD AND NECK WITH CONTRAST 3/26/2022 2:11 pm: TECHNIQUE: CTA of the head and neck was performed with the administration of intravenous contrast. Multiplanar reformatted images are provided for review. MIP images are provided for review. Stenosis of the internal carotid arteries measured using NASCET criteria. Dose modulation, iterative reconstruction, and/or weight based adjustment of the mA/kV was utilized to reduce the radiation dose to as low as reasonably achievable.  3D reconstructed images were performed on a separate workstation and provided for review. This scan was analyzed using Voice[employer]. ai contact LVO. Identification of suspected findings is not for diagnostic use beyond notification. Viz LVO is limited to analysis of imaging data and should not be used in-lieu of full patient evaluation or relied upon to make or confirm diagnosis. COMPARISON: 07/10/2014. HISTORY: ORDERING SYSTEM PROVIDED HISTORY: Stroke left facial droop, left-sided weakness, slurred speech TECHNOLOGIST PROVIDED HISTORY: Stroke left facial droop, left-sided weakness, slurred speech Decision Support Exception - unselect if not a suspected or confirmed emergency medical condition->Emergency Medical Condition (MA) Reason for Exam: Stroke left facial droop, left-sided weakness, slurred speech FINDINGS: CTA NECK: AORTIC ARCH/ARCH VESSELS: No dissection or arterial injury. No significant stenosis of the brachiocephalic or subclavian arteries. CAROTID ARTERIES: No dissection, arterial injury, or hemodynamically significant stenosis by NASCET criteria. VERTEBRAL ARTERIES: No dissection, arterial injury, or significant stenosis. SOFT TISSUES: The lung apices are clear. No cervical or superior mediastinal lymphadenopathy. The larynx and pharynx are unremarkable. No acute abnormality of the salivary and thyroid glands. BONES: No acute osseous abnormality. Multilevel cervical spondylosis, most notable C5-C6. CTA HEAD: ANTERIOR CIRCULATION: No significant stenosis of the intracranial internal carotid, anterior cerebral, or middle cerebral arteries. No aneurysm. POSTERIOR CIRCULATION: No significant stenosis of the vertebral, basilar, or posterior cerebral arteries. No aneurysm. OTHER: No dural venous sinus thrombosis on this non-dedicated study. Similar-appearing increased venous vasculature near the left M1 distribution. BRAIN: No mass effect or midline shift.      No evidence of large vessel occlusion, significant stenosis, dissection, or aneurysmal dilation in the major arteries of the head and neck.        LABS:  Results for orders placed or performed during the hospital encounter of 03/26/22   CBC with Auto Differential   Result Value Ref Range    WBC 7.2 3.5 - 11.3 k/uL    RBC 4.89 3.95 - 5.11 m/uL    Hemoglobin 14.9 11.9 - 15.1 g/dL    Hematocrit 44.7 36.3 - 47.1 %    MCV 91.4 82.6 - 102.9 fL    MCH 30.5 25.2 - 33.5 pg    MCHC 33.3 25.2 - 33.5 g/dL    RDW 13.7 11.8 - 14.4 %    Platelets 267 349 - 938 k/uL    MPV 8.9 8.1 - 13.5 fL    NRBC Automated 0.0 0.0 per 100 WBC    Seg Neutrophils 60 36 - 65 %    Lymphocytes 30 24 - 43 %    Monocytes 7 3 - 12 %    Eosinophils % 2 1 - 4 %    Basophils 1 0 - 2 %    Immature Granulocytes 0 0 %    Segs Absolute 4.31 1.50 - 8.10 k/uL    Absolute Lymph # 2.13 1.10 - 3.70 k/uL    Absolute Mono # 0.51 0.10 - 1.20 k/uL    Absolute Eos # 0.12 0.00 - 0.44 k/uL    Basophils Absolute 0.08 0.00 - 0.20 k/uL    Absolute Immature Granulocyte <0.03 0.00 - 0.30 k/uL   Comprehensive Metabolic Panel w/ Reflex to MG   Result Value Ref Range    Glucose 95 70 - 99 mg/dL    BUN 15 6 - 20 mg/dL    CREATININE 0.55 0.50 - 0.90 mg/dL    Bun/Cre Ratio 27 (H) 9 - 20    Calcium 9.2 8.6 - 10.4 mg/dL    Sodium 136 135 - 144 mmol/L    Potassium 4.2 3.7 - 5.3 mmol/L    Chloride 101 98 - 107 mmol/L    CO2 26 20 - 31 mmol/L    Anion Gap 9 9 - 17 mmol/L    Alkaline Phosphatase 107 (H) 35 - 104 U/L     (H) 5 - 33 U/L     (H) <32 U/L    Total Bilirubin 0.22 (L) 0.3 - 1.2 mg/dL    Total Protein 7.1 6.4 - 8.3 g/dL    Albumin 4.5 3.5 - 5.2 g/dL    Albumin/Globulin Ratio 1.7 1.0 - 2.5    GFR Non-African American >60 >60 mL/min    GFR African American >60 >60 mL/min    GFR Comment         Troponin   Result Value Ref Range    Troponin, High Sensitivity <6 0 - 14 ng/L   Brain Natriuretic Peptide   Result Value Ref Range    Pro-BNP 60 <300 pg/mL   Protime-INR   Result Value Ref Range    Protime 13.0 11.5 - 14.2 sec    INR 1.0    APTT   Result Value Ref Range    PTT 24.7 23.9 - 33.8 sec   POCT Glucose   Result Value Ref Range    Glucose 83 mg/dL    QC OK? ok    POC Glucose Fingerstick   Result Value Ref Range    POC Glucose 83 65 - 105 mg/dL   EKG 12 Lead   Result Value Ref Range    Ventricular Rate 70 BPM    Atrial Rate 70 BPM    P-R Interval 142 ms    QRS Duration 74 ms    Q-T Interval 400 ms    QTc Calculation (Bazett) 432 ms    P Axis 44 degrees    R Axis 43 degrees    T Axis 61 degrees           EMERGENCY DEPARTMENT COURSE:   Vitals:    Vitals:    03/26/22 1322   BP: (!) 155/81   Pulse: 74   Resp: 16   Temp: 97 °F (36.1 °C)   TempSrc: Tympanic   SpO2: 100%   Weight: 160 lb (72.6 kg)   Height: 5' 4\" (1.626 m)     -------------------------  BP: (!) 155/81, Temp: 97 °F (36.1 °C), Pulse: 74, Resp: 16      RE-EVALUATION:  1:54 PM EDT and a score is 5  2:58 PM EDT NIH score 5. CTA head and neck shows no acute process. 3:14 PM EDT Critical Care: The high probability of sudden, clinically significant deterioration in the patient's condition required the highest level of my preparedness to intervene urgently. ? The services I provided to this patient were to treat and/or prevent clinically significant deterioration. Services included the following: chart data review, reviewing nursing notes and/or old charts, documentation time, consultant collaboration regarding findings and treatment options, medication orders and management, direct patient care, vital sign assessments and ordering, interpreting and reviewing diagnostic studies/lab tests. ?  Aggregate critical care time includes only time during which I was engaged in work directly related to the patient's care, as described above, whether at the bedside or elsewhere in the Emergency Department. It did not include time spent performing other reported procedures or the services of residents, students, nurses, nurse practitioners or physician assistants.   ?  Critical Care: 30 minutes  CONSULTS:  1:45 PM EDT Spoke with Dr Milvia Zavala, neurointerventionalist. He did the head CT without contrast.  Requests a CTA head and neck. Patient will then be transferred to [facility].  3:12 PM EDT Dr Wilder Guerra, ED accepted pt for ED to ED transfer. I updated Dr Milvia Zavala on the CTA head and neck results. He requested ED to ED transfer  PROCEDURES:    FINAL IMPRESSION      1. Acute embolic stroke (HCC)          DISPOSITION/PLAN   DISPOSITION        CONDITION ON DISPOSITION:   Stable    PATIENT REFERRED TO:  No follow-up provider specified. DISCHARGE MEDICATIONS:  New Prescriptions    No medications on file       (Please note that portions of this note were completed with a voicerecognition program.  Efforts were made to edit the dictations but occasionally words are mis-transcribed. )    Natividad Travis DO,, MD, F.A.C.E.P.   Attending Emergency Medicine Physician        Irma Jaramillo DO  03/26/22 [id number]

## 2022-03-26 NOTE — ED NOTES
Current 6/10 headache   Stated a midnight today   Drover herself to urgent care, then [facility]   Pt has some left sided residual deficit from previous CVA  Pt on [employer]   CT/ CTA all negative at [facility]  Pt hooked up to cardiac monitor  Labs drawn    Pt alert and oriented  Ambulatory to Jase Omalley RN  03/26/22 1948       Preeti Redd RN  03/26/22 1948       Preeti Redd RN  03/26/22 1952

## 2022-03-27 ENCOUNTER — APPOINTMENT (OUTPATIENT)
Dept: MRI IMAGING | Age: 62
DRG: 103 | End: 2022-03-27
Payer: COMMERCIAL

## 2022-03-27 VITALS
OXYGEN SATURATION: 94 % | BODY MASS INDEX: 27.2 KG/M2 | DIASTOLIC BLOOD PRESSURE: 55 MMHG | TEMPERATURE: 97.7 F | HEART RATE: 86 BPM | SYSTOLIC BLOOD PRESSURE: 101 MMHG | HEIGHT: 64 IN | WEIGHT: 159.3 LBS | RESPIRATION RATE: 14 BRPM

## 2022-03-27 LAB
ABSOLUTE EOS #: 0 K/UL (ref 0–0.4)
ABSOLUTE IMMATURE GRANULOCYTE: 0 K/UL (ref 0–0.3)
ABSOLUTE LYMPH #: 0.92 K/UL (ref 1–4.8)
ABSOLUTE MONO #: 0.07 K/UL (ref 0.1–0.8)
ANION GAP SERPL CALCULATED.3IONS-SCNC: 14 MMOL/L (ref 9–17)
BASOPHILS # BLD: 0 % (ref 0–2)
BASOPHILS ABSOLUTE: 0 K/UL (ref 0–0.2)
BUN BLDV-MCNC: 15 MG/DL (ref 6–20)
CALCIUM SERPL-MCNC: 9.1 MG/DL (ref 8.6–10.4)
CHLORIDE BLD-SCNC: 103 MMOL/L (ref 98–107)
CO2: 19 MMOL/L (ref 20–31)
CREAT SERPL-MCNC: 0.55 MG/DL (ref 0.5–0.9)
EKG ATRIAL RATE: 70 BPM
EKG P AXIS: 44 DEGREES
EKG P-R INTERVAL: 142 MS
EKG Q-T INTERVAL: 400 MS
EKG QRS DURATION: 74 MS
EKG QTC CALCULATION (BAZETT): 432 MS
EKG R AXIS: 43 DEGREES
EKG T AXIS: 61 DEGREES
EKG VENTRICULAR RATE: 70 BPM
EOSINOPHILS RELATIVE PERCENT: 0 % (ref 1–4)
GFR AFRICAN AMERICAN: >60 ML/MIN
GFR NON-AFRICAN AMERICAN: >60 ML/MIN
GFR SERPL CREATININE-BSD FRML MDRD: ABNORMAL ML/MIN/{1.73_M2}
GLUCOSE BLD-MCNC: 122 MG/DL (ref 70–99)
HCT VFR BLD CALC: 45.3 % (ref 36.3–47.1)
HEMOGLOBIN: 14.9 G/DL (ref 11.9–15.1)
IMMATURE GRANULOCYTES: 0 %
LYMPHOCYTES # BLD: 13 % (ref 24–44)
MCH RBC QN AUTO: 30.5 PG (ref 25.2–33.5)
MCHC RBC AUTO-ENTMCNC: 32.9 G/DL (ref 28.4–34.8)
MCV RBC AUTO: 92.6 FL (ref 82.6–102.9)
MONOCYTES # BLD: 1 % (ref 1–7)
MORPHOLOGY: NORMAL
NRBC AUTOMATED: 0 PER 100 WBC
PDW BLD-RTO: 13.8 % (ref 11.8–14.4)
PLATELET # BLD: 312 K/UL (ref 138–453)
PMV BLD AUTO: 9.3 FL (ref 8.1–13.5)
POTASSIUM SERPL-SCNC: 4.1 MMOL/L (ref 3.7–5.3)
RBC # BLD: 4.89 M/UL (ref 3.95–5.11)
SEG NEUTROPHILS: 86 % (ref 36–66)
SEGMENTED NEUTROPHILS ABSOLUTE COUNT: 6.11 K/UL (ref 1.8–7.7)
SODIUM BLD-SCNC: 136 MMOL/L (ref 135–144)
WBC # BLD: 7.1 K/UL (ref 3.5–11.3)

## 2022-03-27 PROCEDURE — 6370000000 HC RX 637 (ALT 250 FOR IP): Performed by: STUDENT IN AN ORGANIZED HEALTH CARE EDUCATION/TRAINING PROGRAM

## 2022-03-27 PROCEDURE — 6360000002 HC RX W HCPCS: Performed by: STUDENT IN AN ORGANIZED HEALTH CARE EDUCATION/TRAINING PROGRAM

## 2022-03-27 PROCEDURE — 97162 PT EVAL MOD COMPLEX 30 MIN: CPT

## 2022-03-27 PROCEDURE — 99223 1ST HOSP IP/OBS HIGH 75: CPT | Performed by: PSYCHIATRY & NEUROLOGY

## 2022-03-27 PROCEDURE — 2500000003 HC RX 250 WO HCPCS: Performed by: STUDENT IN AN ORGANIZED HEALTH CARE EDUCATION/TRAINING PROGRAM

## 2022-03-27 PROCEDURE — 2580000003 HC RX 258: Performed by: STUDENT IN AN ORGANIZED HEALTH CARE EDUCATION/TRAINING PROGRAM

## 2022-03-27 PROCEDURE — 80048 BASIC METABOLIC PNL TOTAL CA: CPT

## 2022-03-27 PROCEDURE — 36415 COLL VENOUS BLD VENIPUNCTURE: CPT

## 2022-03-27 PROCEDURE — 85025 COMPLETE CBC W/AUTO DIFF WBC: CPT

## 2022-03-27 PROCEDURE — 97530 THERAPEUTIC ACTIVITIES: CPT

## 2022-03-27 PROCEDURE — 70551 MRI BRAIN STEM W/O DYE: CPT

## 2022-03-27 RX ORDER — KETOROLAC TROMETHAMINE 30 MG/ML
15 INJECTION, SOLUTION INTRAMUSCULAR; INTRAVENOUS ONCE
Status: COMPLETED | OUTPATIENT
Start: 2022-03-27 | End: 2022-03-27

## 2022-03-27 RX ORDER — FAMOTIDINE 20 MG/1
40 TABLET, FILM COATED ORAL DAILY
Status: DISCONTINUED | OUTPATIENT
Start: 2022-03-27 | End: 2022-03-27 | Stop reason: HOSPADM

## 2022-03-27 RX ORDER — PANTOPRAZOLE SODIUM 40 MG/1
40 TABLET, DELAYED RELEASE ORAL
Status: DISCONTINUED | OUTPATIENT
Start: 2022-03-27 | End: 2022-03-27 | Stop reason: HOSPADM

## 2022-03-27 RX ORDER — RABEPRAZOLE SODIUM 20 MG/1
1 TABLET, DELAYED RELEASE ORAL DAILY
Status: DISCONTINUED | OUTPATIENT
Start: 2022-03-27 | End: 2022-03-27

## 2022-03-27 RX ORDER — METOCLOPRAMIDE HYDROCHLORIDE 5 MG/ML
10 INJECTION INTRAMUSCULAR; INTRAVENOUS ONCE
Status: COMPLETED | OUTPATIENT
Start: 2022-03-27 | End: 2022-03-27

## 2022-03-27 RX ADMIN — SODIUM CHLORIDE, PRESERVATIVE FREE 10 ML: 5 INJECTION INTRAVENOUS at 08:59

## 2022-03-27 RX ADMIN — CLOPIDOGREL 75 MG: 75 TABLET, FILM COATED ORAL at 08:58

## 2022-03-27 RX ADMIN — METOCLOPRAMIDE 10 MG: 5 INJECTION, SOLUTION INTRAMUSCULAR; INTRAVENOUS at 15:31

## 2022-03-27 RX ADMIN — ACETAMINOPHEN 650 MG: 325 TABLET ORAL at 11:24

## 2022-03-27 RX ADMIN — DEXTROSE MONOHYDRATE 500 MG: 50 INJECTION, SOLUTION INTRAVENOUS at 15:29

## 2022-03-27 RX ADMIN — ENOXAPARIN SODIUM 40 MG: 40 INJECTION SUBCUTANEOUS at 08:58

## 2022-03-27 RX ADMIN — PANTOPRAZOLE SODIUM 40 MG: 40 TABLET, DELAYED RELEASE ORAL at 15:31

## 2022-03-27 RX ADMIN — FAMOTIDINE 40 MG: 20 TABLET, FILM COATED ORAL at 15:32

## 2022-03-27 RX ADMIN — KETOROLAC TROMETHAMINE 15 MG: 30 INJECTION, SOLUTION INTRAMUSCULAR at 06:27

## 2022-03-27 ASSESSMENT — PAIN SCALES - GENERAL
PAINLEVEL_OUTOF10: 6
PAINLEVEL_OUTOF10: 0
PAINLEVEL_OUTOF10: 3
PAINLEVEL_OUTOF10: 7

## 2022-03-27 ASSESSMENT — ENCOUNTER SYMPTOMS
WHEEZING: 0
VOMITING: 0
NAUSEA: 0
PHOTOPHOBIA: 0
BACK PAIN: 0
SHORTNESS OF BREATH: 0
COUGH: 0

## 2022-03-27 ASSESSMENT — PAIN DESCRIPTION - PAIN TYPE: TYPE: ACUTE PAIN

## 2022-03-27 ASSESSMENT — PAIN DESCRIPTION - ORIENTATION: ORIENTATION: UPPER;ANTERIOR

## 2022-03-27 ASSESSMENT — PAIN DESCRIPTION - LOCATION: LOCATION: HEAD

## 2022-03-27 NOTE — CONSULTS
Department of Endovascular Neurosurgery                                                                                                                      Resident Consult Note  Stroke Alert paged @ 9.24 pm  ER Room # 17  Arrival to patient bedside @ 9.25 pm        Reason for Consult:  Headache, left sided droop and left sided weakness  Requesting Physician:  Kimberley Humphreys    History Obtained From:  patient    CHIEF COMPLAINT:       headache    HISTORY OF PRESENT ILLNESS:       The patient is a 63 y.o. female who has past medical history of CVA on plavix,anxiety, migraine and chronic smoker presents  at [facility] for concern of severe headache and was transferred to [facility] for further evaluation. She mentioned she started having headache since 12.00 am, which was sharp in nature, 10/10, all over head, not relieved with NSAIDS , associated with photophobia and phonophobia. Her  noted to have left sided facial droop. She went for urgent care and was recommended to go to hospital. She was [place] in [place] stroke team( Dr. Jose Sanchez as telestroke) . She was noted to have left extremity weakness and left sided facial droop which has been her her baseline. CTA negative for LVO and CT head was negative for acute intracranial abnormalities. She is then transferred to [facility] for further evaluation. She She did have episode of headache in past but were not as bad as this one and used to relieve with NSAIDs. Patient also mentioned to have nausea and 2 episode of vomit. Patient denied for fever, nuchal rigidity, vision changes, sob, chest pain and cough.     Labs reviewed unremarkable except for elevated liver enzyme    Last know well:12.00 am    On presentation:  BP:131/79  BSL:75    Prior to arrival patient was on  Antiplatelets/anticoagulants:plavix  Statins:None      Smoking history: smoker  (1/2 ppd x 45 yrs)       PAST MEDICAL HISTORY :       Past Medical History:        Diagnosis Date    Anxiety     Cerebrovascular disease     Mini stroke in 2006    CVA (cerebral infarction)     Facial weakness     left    Family history of colon cancer     Gastroesophageal reflux disease without esophagitis 5/31/2016    History of TIAs     Hypoglycemia     Left hemiparesis (HCC)     Memory problem     Migraine     Sleep difficulties     Speech abnormality     Tobacco abuse     Unspecified sleep apnea        Past Surgical History:        Procedure Laterality Date    APPENDECTOMY      CARDIAC CATHETERIZATION  7-2-15    nml    CHOLECYSTECTOMY      COLONOSCOPY  4/29/2015    2 polyps, sigmoid diverticulosis    FOOT SURGERY Right     HYSTERECTOMY      OVARIAN CYST SURGERY      TONSILLECTOMY         Social History:   Social History     Socioeconomic History    Marital status:      Spouse name: Not on file    Number of children: Not on file    Years of education: Not on file    Highest education level: Not on file   Occupational History     Employer: NONE   Tobacco Use    Smoking status: Current Every Day Smoker     Packs/day: 0.50     Years: 20.00     Pack years: 10.00    Smokeless tobacco: Never Used   Vaping Use    Vaping Use: Never used   Substance and Sexual Activity    Alcohol use: No     Alcohol/week: 0.0 standard drinks    Drug use: No    Sexual activity: Not on file   Other Topics Concern    Not on file   Social History Narrative    Not on file     Social Determinants of Health     Financial Resource Strain: Low Risk     Difficulty of Paying Living Expenses: Not hard at all   Food Insecurity: No Food Insecurity    Worried About Running Out of Food in the Last Year: Never true    [place] of Food in the Last Year: Never true   Transportation Needs:     Lack of Transportation (Medical): Not on file    Lack of Transportation (Non-Medical):  Not on file   Physical Activity:     Days 3/26/2022 3/17/22   RUSSELL Faulkner   RABEprazole (ACIPHEX) 20 MG tablet TAKE 1 TABLET DAILY  Patient not taking: Reported on 3/26/2022 2/28/22   RUSSELL Faulkner   famotidine (PEPCID) 40 MG tablet TAKE 1 TABLET EVERY EVENING  Patient not taking: Reported on 3/26/2022 2/28/22   RUSSELL Faulkner   solifenacin (VESICARE) 5 MG tablet TAKE 1 TABLET DAILY  Patient not taking: Reported on 3/26/2022 2/28/22   RUSSELL Faulkner   albuterol sulfate HFA (VENTOLIN HFA) 108 (90 Base) MCG/ACT inhaler Inhale 2 puffs into the lungs every 4 hours as needed for Wheezing or Shortness of Breath 1/17/22   Ale Morales MD   Spacer/Aero-Holding Murvin Seats [place] 1 Device by Does not apply route daily as needed (use with inhaler)  Patient not taking: Reported on 3/26/2022 1/17/22   Ale Morales MD   celecoxib (CELEBREX) 200 MG capsule Take 1 capsule by mouth daily  Patient not taking: Reported on 3/26/2022 12/2/21   RUSSELL Faulkner   Multiple Vitamin (MULTI VITAMIN DAILY PO) Take by mouth  Patient not taking: Reported on 3/26/2022    Historical Provider, MD   ibuprofen (ADVIL;MOTRIN) 600 MG tablet Take 1 tablet by mouth every 8 hours as needed for Pain 6/25/21   Baltazar Courtney MD   mometasone (NASONEX) 50 MCG/ACT nasal spray Two sprays to each nostril once daily.   Patient not taking: Reported on 3/26/2022 6/15/21   RUSSELL Faulkner   Cyanocobalamin (VITAMIN B 12 PO) Take by mouth  Patient not taking: Reported on 3/26/2022    Historical Provider, MD   Cholecalciferol (VITAMIN D3 PO) Take by mouth daily     Historical Provider, MD       Current Medications:   Current Facility-Administered Medications: albuterol sulfate  (90 Base) MCG/ACT inhaler 2 puff, 2 puff, Inhalation, Q4H PRN  [START ON 3/27/2022] clopidogrel (PLAVIX) tablet 75 mg, 75 mg, Oral, Daily  sodium chloride flush 0.9 % injection 5-40 mL, 5-40 mL, IntraVENous, 2 times per day  sodium chloride flush 0.9 % injection 5-40 mL, 5-40 mL, IntraVENous, PRN  0.9 % sodium chloride infusion, 25 mL, IntraVENous, PRN  [START ON 3/27/2022] enoxaparin (LOVENOX) injection 40 mg, 40 mg, SubCUTAneous, Daily  ondansetron (ZOFRAN-ODT) disintegrating tablet 4 mg, 4 mg, Oral, Q8H PRN **OR** ondansetron (ZOFRAN) injection 4 mg, 4 mg, IntraVENous, Q6H PRN  polyethylene glycol (GLYCOLAX) packet 17 g, 17 g, Oral, Daily PRN  acetaminophen (TYLENOL) tablet 650 mg, 650 mg, Oral, Q6H PRN **OR** acetaminophen (TYLENOL) suppository 650 mg, 650 mg, Rectal, Q6H PRN  methylPREDNISolone sodium (SOLU-MEDROL) injection 125 mg, 125 mg, IntraVENous, Once  ketorolac (TORADOL) injection 15 mg, 15 mg, IntraVENous, Once  0.9 % sodium chloride bolus, 500 mL, IntraVENous, Once  diphenhydrAMINE (BENADRYL) injection 25 mg, 25 mg, IntraVENous, Once    REVIEW OF SYSTEMS:       CONSTITUTIONAL: negative for fatigue and malaise   EYES: negative for double vision and photophobia    HEENT: negative for tinnitus and sore throat   RESPIRATORY: negative for cough, shortness of breath   CARDIOVASCULAR: negative for chest pain, palpitations   GASTROINTESTINAL: negative for nausea, vomiting   GENITOURINARY: negative for incontinence   MUSCULOSKELETAL: negative for neck or back pain   NEUROLOGICAL: negative for seizures   PSYCHIATRIC: negative for fatigue     Review of systems otherwise negative. PHYSICAL EXAM:       /67   Pulse 60   Temp 97.3 °F (36.3 °C) (Oral)   Resp 14   SpO2 95%     CONSTITUTIONAL:  Well developed, well nourished, alert and oriented x 3, in no acute distress. GCS 15, nontoxic. No dysarthria, no aphasia.    HEAD:  normocephalic, atraumatic    EYES:  PERRLA, EOMI.   ENT:  moist mucous membranes   NECK:  supple, symmetric, no midline tenderness to palpation    BACK:  without midline tenderness, step-offs or deformities    LUNGS:  Equal air entry bilaterally   CARDIOVASCULAR:  normal s1 / s2   ABDOMEN:  Soft, no rigidity   NEUROLOGIC:  Mental Status:  A & O x3,awake             Cranial Nerves: cranial nerves II-XII are grossly intact    Motor Exam:    Drift:  absent  Tone:  normal    Motor exam is 5 out of 5 all extremities with the exception of left upper and lower extremities of 4/5    Sensory:    Touch:    Right Upper Extremity:  normal  Left Upper Extremity:  normal  Right Lower Extremity:  normal  Left Lower Extremity:  normal    Deep Tendon Reflexes:    Right Bicep:  2+  Left Bicep:  2+  Right Knee:  2+  Left Knee:  2+    Plantar Response:  Right:  downgoing  Left:  downgoing    Clonus:  N/A  Morrow's:  N/A    Coordination/Dysmetria:  Heel to Shin:  Right:  normal  Finger to Nose:   Right:  normal   Dysdiadochokinesia:  absent    Gait:  normal    INITIAL NIH STROKE SCALE:    Time Performed:  9.30 PM     1a. Level of consciousness:  0 - alert; keenly responsive  1b. Level of consciousness questions:  0 - answers both questions correctly  1c. Level of consciousness questions:  0 - performs both tasks correctly  2. Best Gaze:  0 - normal  3. Visual:  0 - no visual loss  4. Facial Palsy:  1 - minor paralysis (flattened nasolabial fold, asymmetric on smiling)  5a. Motor left arm:  1 - drift; leg falls by the end of the 5 second period but does not hit bed  5b. Motor right arm:  0 - no drift, limb holds 90 (or 45) degrees for full 10 seconds  6a. Motor left le - drift; leg falls by the end of the 5 second period but does not hit bed  6b. Motor right le - no drift; leg holds 30 degree position for full 5 seconds  7. Limb Ataxia:  0 - absent  8. Sensory:  0 - normal; no sensory loss  9. Best Language:  0 - no aphasia, normal  10. Dysarthria:  0 - normal  11.   Extinction and Inattention:  0 - no abnormality    TOTAL:  3     SKIN:  no rash      Modified Carley Score Scale:     [] Zero: No symptoms at all   [] 1: No significant disability despite symptoms; able to carry out all usual duties and activities   [] 2: Slight disability; unable to carry out all previous externum of right upper eyelid    Preseptal cellulitis of right upper eyelid    Squamous blepharitis of upper and lower eyelids of both eyes    Headache    Stroke-like episode       Assessment                 63 y.o. female who presents with The patient is a 63 y.o. female who has past medical history of CVA on plavix,anxiety, migraine and chronic smoker presents  at [address] [place] [address] for concern of severe headache and was transferred to [facility] for further evaluation. 1. Last Known Well (date and time): 12.00     2. Candidate for IV tPA therapy     Yes []     No  [x] due to the following exclusion criteria: NIHSS 3    3. Candidate for Thrombectomy    Yes []      No [x] due to the following exclusion criteria: NIHSS 3    - Discussed with Dr. Jack Skiff     Recommendations:    [x] General Neurology Care Status - prefer 5th floor (5A/5C)   [] Internal Medicine General Care Status   [] NICU Status - (5B)     [] MICU Status   [] Observation Status        Imaging   - CT Head  WO : done no intracranial abnormalities noted  - CTA Head and Neck : done negative for LVO  - MRI Brain WO :Pended    Medications   - Clopidogrel 75 mg  md daily   - Atorvastatin will discuss with attending as patient has dearranged LFT    Labs  - Fasting Lipid panel  - HgbA1c lab      - PT, OT, Speech eval   - Single dose of benadryl 25 mg IV, toradol 15 mg IV, solumedrol 125 mg IV and normal saline bolus 500 ml  - Telemetry   - Neuro checks per protocol  - We recommend SBP less than 140  - Blood glucose goal less than 180  - Please avoid dextrose containing solutions        Additional recommendations may follow    Please contact [facility] NS[facility] with any changes in patients neurologic status. Thank you for your consult.        Adelina Mercado MD   Internal Medicine Resident [address]  3/26/2022 at 11:08 PM

## 2022-03-27 NOTE — ED PROVIDER NOTES
[facility] ED  eMERGENCY dEPARTMENT eNCOUnter   Attending Attestation     Pt Name: Luiza Frazier  MRN: [id number]  [place] 1960  Date of evaluation: 3/26/22       Luiza Frazier is a 63 y.o. female who presents with Headache (top and left side)      History: Nausea with headache. Patient says headache started yesterday. Patient was driving to emergency department because headache was severe. Patient said that she has some facial droop that is constant and chronic from a stroke in 2006 however emergency department physician and nurse thought that she looked worse when she arrived to emergency department and when she is getting ready to leave they noticed this change that there is improvement in the facial droop and thought that she should be evaluated. Patient sent here after discussion with neurology from [facility] facility. They would like stroke team to evaluate and likely admit. Exam: Heart rate and rhythm are regular. Lungs are clear to auscultation bilaterally. Abdomen is soft, nontender. Patient is awake and alert and acting appropriately. Neurology to see. I have low suspicion for acute stroke based on the fact that she is having no symptoms at this time. [facility] neck negative    I performed a history and physical examination of the patient and discussed management with the resident. I reviewed the residents note and agree with the documented findings and plan of care. Any areas of disagreement are noted on the chart. I was personally present for the key portions of any procedures. I have documented in the chart those procedures where I was not present during the key portions. I have personally reviewed all images and agree with the resident's interpretation. I have reviewed the emergency nurses triage note.  I agree with the chief complaint, past medical history, past surgical history, allergies, medications, social and family history as documented unless otherwise noted below. Documentation of the HPI, Physical Exam and Medical Decision Making performed by medical students or scribes is based on my personal performance of the HPI, PE and MDM. For Phys Assistant/ Nurse Practitioner cases/documentation I have had a face to face evaluation of this patient and have completed at least one if not all key elements of the E/M (history, physical exam, and MDM). Additional findings are as noted. For APC cases I have personally evaluated and examined the patient in conjunction with the APC and agree with the treatment plan and disposition of the patient as recorded by the APC.     Kandy Garay MD  Attending Emergency  Physician       Verona Contreras MD  03/26/22 1912

## 2022-03-27 NOTE — ED NOTES
Pt up to the floor with all belongings      [facility]  03/26/22 [phone]

## 2022-03-27 NOTE — ED NOTES
Pt resting asleep in bed on left side. Respirations unlabored.  Call light with in reach      [facility]  03/26/22 2971

## 2022-03-27 NOTE — CARE COORDINATION
Case Management Initial Discharge Plan  Luz Marina Benitez,             Met with:patient to discuss discharge plans. Information verified: address, contacts, phone number, , insurance Yes  Insurance Provider: Kendra Singh    Emergency Contact/Next of Kin name & number:   Kelley Perez (spouse) [phone]  Who are involved in patient's support system? spouse    PCP: RUSSELL Claros  Date of last visit: has apt in April      Discharge Planning    Living Arrangements:  Spouse/Significant Other     Home has 1 stories  3 stairs to climb to get into front door    Patient able to perform ADL's:Independent    Current Services (outpatient & in home) na  DME equipment: na  DME provider: na    Is patient receiving oral anticoagulation therapy? Yes- plavix        Potential Assistance Needed:  N/A    Patient agreeable to home care: No  [place] of choice provided:  n/a    Prior SNF/Rehab Placement and Facility: na  Agreeable to SNF/Rehab: No  [place] of choice provided: n/a     Evaluation: no    Expected Discharge date:     Patient expects to be discharged to:   home    If home: is the family and/or caregiver wiling & able to provide support at home? yes  Who will be providing this support? spouse    Follow Up Appointment: Best Day/ Time:      Transportation provider: spouse  Transportation arrangements needed for discharge: No    Readmission Risk              Risk of Unplanned Readmission:  7             Does patient have a readmission risk score greater than 14?: No  If yes, follow-up appointment must be made within 7 days of discharge.      Goals of Care:       Educated patient on transitional options, provided freedom of choice and are agreeable with plan      Discharge Plan: d/c home independent          Electronically signed by Denny Choudhury RN on 3/27/22 at 4:21 PM EDT

## 2022-03-27 NOTE — PLAN OF CARE
Problem: Pain:  Goal: Pain level will decrease  Description: Pain level will decrease  3/27/2022 1801 by Paul Meade RN  Outcome: Completed  3/27/2022 0940 by Paul Meade RN  Outcome: Ongoing  Goal: Control of acute pain  Description: Control of acute pain  3/27/2022 1801 by Paul Meade RN  Outcome: Completed  3/27/2022 0940 by Paul Meade RN  Outcome: Ongoing  Goal: Control of chronic pain  Description: Control of chronic pain  3/27/2022 1801 by Paul Meade RN  Outcome: Completed  3/27/2022 0940 by Paul Meade RN  Outcome: Ongoing     Problem: Falls - Risk of:  Goal: Will remain free from falls  Description: Will remain free from falls  3/27/2022 1801 by Paul Meade RN  Outcome: Completed  3/27/2022 0940 by Paul Meade RN  Outcome: Ongoing  Goal: Absence of physical injury  Description: Absence of physical injury  3/27/2022 1801 by Pual Meade RN  Outcome: Completed  3/27/2022 0940 by Paul Meade RN  Outcome: Ongoing

## 2022-03-27 NOTE — PROGRESS NOTES
Physical Therapy    Facility/Department: [address] NEURO  Initial Assessment    NAME: Priscilla Daly  : 1960  MRN: [id number]    Chief Complaint   Patient presents with    Headache     top and left side       Date of Service: 3/27/2022    Discharge Recommendations:    Further therapy recommended at discharge. Assessment   Body structures, Functions, Activity limitations: Decreased functional mobility ; Decreased strength;Decreased balance  Assessment: Pt grossly CGA for mobility, amb 150' no AD CGA, variable foot placemenet, R LOB self corrected. Pt would benefit from continued acute PT to address deficits. PT Education: Plan of Care;PT Role;General Safety;Transfer Training;Functional Mobility Training  Activity Tolerance  Activity Tolerance: Patient Tolerated treatment well;Patient limited by fatigue  Activity Tolerance: balance, states hasn't eaten since noon last friday       Patient Diagnosis(es): The encounter diagnosis was Acute nonintractable headache, unspecified headache type. has a past medical history of Anxiety, Cerebrovascular disease, CVA (cerebral infarction), Facial weakness, Family history of colon cancer, Gastroesophageal reflux disease without esophagitis, History of TIAs, Hypoglycemia, Left hemiparesis ([address] Memory problem, Migraine, Sleep difficulties, Speech abnormality, Tobacco abuse, and Unspecified sleep apnea. has a past surgical history that includes Appendectomy; Hysterectomy; Ovarian cyst surgery; Tonsillectomy; Colonoscopy (2015); Cardiac catheterization (7-2-15); Foot surgery (Right); and Cholecystectomy.     Restrictions  Restrictions/Precautions  Restrictions/Precautions: General Precautions,Fall Risk  Required Braces or Orthoses?: No  Position Activity Restriction  Other position/activity restrictions: up with assist  Vision/Hearing  Vision: Impaired  Vision Exceptions: Wears glasses for reading;Wears glasses for distance;Wears glasses at all times  Hearing: Within functional limits     Subjective  General  Chart Reviewed: Yes  Patient assessed for rehabilitation services?: Yes  Response To Previous Treatment: Not applicable  Family / Caregiver Present: Yes ( and mother-in-law arrived mid session)  Follows Commands: Within Functional Limits  General Comment  Comments: RN and pt agreeable to PT. Pt alert in bed upon arrival.  Pain Screening  Patient Currently in Pain: Yes  Pain Assessment  Pain Assessment: 0-10  Pain Level: 6  Pain Type: Acute pain  Pain Location: Head  Pain Orientation: Upper; Anterior  Non-Pharmaceutical Pain Intervention(s): Ambulation/Increased Activity; Distraction; Emotional support  Response to Pain Intervention: Patient Satisfied  Vital Signs  Patient Currently in Pain: Yes  Pre Treatment Pain Screening  Intervention List: Patient able to continue with treatment    Orientation  Orientation  Overall Orientation Status: Within Functional Limits  Social/Functional History  Social/Functional History  Lives With: Spouse (little dog)  Type of Home:  (duplex)  Home Layout: One level  Home Access: Stairs to enter without rails  Entrance Stairs - Number of Steps: 3  Bathroom Shower/Tub: Tub/Shower unit  Bathroom Toilet: Standard  Bathroom Equipment:  (none)  Bathroom Accessibility: Accessible  Home Equipment: Rolling walker,Cane  ADL Assistance: Independent  Homemaking Assistance: Independent  Homemaking Responsibilities: Yes (she cooks, clean, laundry.  split yard work)  Ambulation Assistance: Independent  Transfer Assistance: Independent  Active : Yes  Mode of Transportation: [facility]  Occupation: Full time employment  Type of occupation: [employer], night shift  Leisure & Hobbies: flea markets, sleep  Cognition    WFL    Objective          AROM RLE (degrees)  RLE AROM: WFL  AROM LLE (degrees)  LLE AROM : WFL  AROM RUE (degrees)  RUE AROM : WFL  AROM LUE (degrees)  LUE AROM : WFL  Strength RLE  Strength RLE: WFL  Strength LLE  Strength LLE: WFL  Strength RUE  Strength RUE: WFL  Strength LUE  Strength LUE: WFL     Sensation  Overall Sensation Status: WFL (pt reports numbness/ tingling L cheek)  Bed mobility  Comment: pt performed while author getting linen for chair.  standing in room talking to family upon return, in chair at end of session  Transfers  Sit to Stand:  (standing in room upon return with linen for chair)  Stand to sit: Contact guard assistance  Ambulation  Ambulation?: Yes  Ambulation 1  Surface: level tile  Device: No Device  Assistance: Contact guard assistance  Quality of Gait: slowed, unsteady, R LOB self corrected, variable foot placement, reciprocal  Distance: 150'  Comments: intermittant R rail use in caicedo  Stairs/Curb  Stairs?: No     Balance  Posture: Good  Sitting - Static: Good  Sitting - Dynamic: Good  Standing - Static: Good;-  Standing - Dynamic: Fair;+  Comments: no AD used        Plan   Plan  Times per week: 5x/wk  Current Treatment Recommendations: Strengthening,Balance Training,Endurance Training,Functional Mobility Training,Transfer Training,Gait Training,Stair training,Home Exercise Program,Safety Education & Training,Patient/Caregiver Education & Training,Equipment Evaluation, Education, & procurement  Safety Devices  Type of devices: Call light within reach,Nurse notified,Gait belt,Patient at risk for falls,Left in chair,All fall risk precautions in place  Restraints  Initially in place: No    AM-PAC Score  AM-PAC Inpatient Mobility Raw Score : 21 (03/27/22 1351)  AM-PAC Inpatient T-Scale Score : 50.25 (03/27/22 135)  Mobility Inpatient CMS 0-100% Score: 28.97 (03/27/22 [facility])  Mobility Inpatient CMS G-Code Modifier : Lashonda Salmeron (03/27/22 [facility])          Goals  Short term goals  Time Frame for Short term goals: 10 visits  Short term goal 1: Pt will be [place] bed mobility  Short term goal 2: Pt will be [place] transfers  Short term goal 3: Pt will be [place] amb 250'  Short term goal 4: Pt will navigate 4 steps [place]       Therapy Time   Individual Concurrent Group Co-treatment   Time In 1120         Time Out 1140         Minutes 20         Timed Code Treatment Minutes: 10 Minutes       Jake Estrada, PT

## 2022-03-27 NOTE — FLOWSHEET NOTE
[address] [facility] [address]     Emergency/Trauma Note    PATIENT NAME: Thu Blair    Shift date: 3/26/22  Shift day: Saturday   Shift # 2    Room # 17/17   Name: Thu Blair            Age: 63 y.o. Gender: female          Taoism: Non-Taoist   Place of Spiritism:     Trauma/Incident type: Stroke Alert  Admit Date & Time: 3/26/2022  7:41 PM  TRAUMA NAME: N/A    ADVANCE DIRECTIVES IN CHART? No    NAME OF DECISION MAKER: N/A    RELATIONSHIP OF DECISION MAKER TO PATIENT: N/A    PATIENT/EVENT DESCRIPTION:  Thu Blair is a 63 y.o. female who arrived as a transfer from [facility].  Pt to be admitted to 17/17. SPIRITUAL ASSESSMENT/INTERVENTION:  Nate Ndiaye was a ministry of presence to medical staff and patient.  spoke with patient who was tired, resting, and stated her head was hurting.  stated to talk to RN for future  services. PATIENT BELONGINGS:  With patient    ANY BELONGINGS OF SIGNIFICANT VALUE NOTED:  N/A    REGISTRATION STAFF NOTIFIED? Yes      WHAT IS YOUR SPIRITUAL CARE PLAN FOR THIS PATIENT?:   Chaplains will remain available to offer spiritual and emotional support as needed. Electronically signed by Jayant Hogan Resident, on 3/26/2022 at 8:53 PM.  [address] [employer]  [phone]       03/26/22 2052   Encounter Summary   Services provided to: Patient   Referral/Consult From: Multi-disciplinary team   Continue Visiting   (3/26/22)   Complexity of Encounter Moderate   Length of Encounter 30 minutes   Spiritual Assessment Completed Yes   Crisis   Type Stroke Alert   Assessment Anxious; Hopeful;Coping   Intervention Sustaining presence/ Ministry of presence   Outcome Receptive

## 2022-03-27 NOTE — H&P
[facility]   IN-PATIENT SERVICE      NEUROLOGY   NOTE            Date:   3/27/2022  Patient name:  Melodie Gottron  Date of admission:  3/26/2022  YOB: 1960      Chief Complaint:     Chief Complaint   Patient presents with    Headache     top and left side       Reason for Consult:        Headache     History of Present Illness: The patient is  y.o. female who has past medical history of CVA on plavix,anxiety, migraine and chronic smoker presents  at [facility] for concern of severe headache and was transferred to [facility] for further evaluation.     She mentioned she started having headache since 12.00 am, which was sharp in nature, 10/10, all over head, not relieved with NSAIDS , associated with photophobia and phonophobia. Her  noted to have left sided facial droop. She went for urgent care and was recommended to go to hospital. She was evaluated in [place] by stroke team( Dr. Nabil Rubalcava as telestroke) . She was noted to have left extremity weakness and left sided facial droop which has been her her baseline. CTA negative for LVO and CT head was negative for acute intracranial abnormalities. She is then transferred to [facility] for further evaluation. She She did have episode of headache in past but were not as bad as this one and used to relieve with NSAIDs.  Patient also mentioned to have nausea and 2 episode of vomit.     Patient denied for fever, nuchal rigidity, vision changes, sob, chest pain and cough.     Labs reviewed unremarkable except for elevated liver enzyme     Last know well:12.00 am     On presentation:  BP:131/79  BSL:75     Prior to arrival patient was on  Antiplatelets/anticoagulants:plavix  Statins:None        Smoking history: smoker  (1/2 ppd x 45 yrs)      Past Medical History:     Past Medical History:   Diagnosis Date    Anxiety     Cerebrovascular disease     Mini stroke in 2006    CVA (cerebral infarction)     Facial weakness     left    Family history of colon cancer     Gastroesophageal reflux disease without esophagitis 5/31/2016    History of TIAs     Hypoglycemia     Left hemiparesis ([facility] [address]     Memory problem     Migraine     Sleep difficulties     Speech abnormality     Tobacco abuse     Unspecified sleep apnea         Past Surgical History:     Past Surgical History:   Procedure Laterality Date    APPENDECTOMY      CARDIAC CATHETERIZATION  7-2-15    nml    CHOLECYSTECTOMY      COLONOSCOPY  4/29/2015    2 polyps, sigmoid diverticulosis    FOOT SURGERY Right     HYSTERECTOMY      OVARIAN CYST SURGERY      TONSILLECTOMY          Medications Prior to Admission:     Prior to Admission medications    Medication Sig Start Date End Date Taking?  Authorizing Provider   acetaminophen (TYLENOL) 500 MG tablet Take 500 mg by mouth every 6 hours as needed for Pain (pain)   Yes Historical Provider, MD   clopidogrel (PLAVIX) 75 MG tablet Take 1 tablet by mouth daily  Patient not taking: Reported on 3/26/2022 3/17/22   RUSSELL Sawyer   RABEprazole (ACIPHEX) 20 MG tablet TAKE 1 TABLET DAILY  Patient not taking: Reported on 3/26/2022 2/28/22   RUSSELL Sawyer   famotidine (PEPCID) 40 MG tablet TAKE 1 TABLET EVERY EVENING  Patient not taking: Reported on 3/26/2022 2/28/22   RUSSELL Sawyer   solifenacin (VESICARE) 5 MG tablet TAKE 1 TABLET DAILY  Patient not taking: Reported on 3/26/2022 2/28/22   RUSSELL Sawyer   albuterol sulfate HFA (VENTOLIN HFA) 108 (90 Base) MCG/ACT inhaler Inhale 2 puffs into the lungs every 4 hours as needed for Wheezing or Shortness of Breath 1/17/22   Alton Vasquez MD   Spacer/Aero-Holding Daly Fetch 1 Device by Does not apply route daily as needed (use with inhaler)  Patient not taking: Reported on 3/26/2022 1/17/22   Alton Vasquez MD   celecoxib (CELEBREX) 200 MG capsule Take 1 capsule by mouth daily  Patient not taking: Reported on 3/26/2022 12/2/21   RUSSELL Sawyer   Multiple Vitamin ([address] VITAMIN DAILY PO) Take by mouth  Patient not taking: Reported on 3/26/2022    Historical Provider, MD   ibuprofen (ADVIL;MOTRIN) 600 MG tablet Take 1 tablet by mouth every 8 hours as needed for Pain 6/25/21   Yocasta Thompson MD   mometasone (NASONEX) 50 MCG/ACT nasal spray Two sprays to each nostril once daily. Patient not taking: Reported on 3/26/2022 6/15/21   RUSSELL Scanlon   Cyanocobalamin (VITAMIN B 12 PO) Take by mouth  Patient not taking: Reported on 3/26/2022    Historical Provider, MD   Cholecalciferol (VITAMIN D3 PO) Take by mouth daily     Historical Provider, MD        Allergies:     Tape Thamas Marines tape]    Social History:     Tobacco:    reports that she has been smoking. She has a 10.00 pack-year smoking history. She has never used smokeless tobacco.  Alcohol:      reports no history of alcohol use. Drug Use:  reports no history of drug use. Family History:     Family History   Problem Relation Age of Onset    Diabetes Mother     Heart Disease Father         afib    High Blood Pressure Father     Diabetes Maternal Grandmother     Cancer Maternal Grandmother         breast    Cancer Maternal Grandfather         brain    Heart Disease Paternal Grandfather     Diabetes Sister     Thyroid Disease Sister     Mental Retardation Sister     Cancer Brother         colon, prostate, lung    Diabetes Brother     Diabetes Brother     Other Brother         hyperglycemia    Heart Disease Brother         atrial fibrillation    Other Other         Jack Syndrome/Jack Syndrome    Other Grandchild         2nd Grandson with Hunters Syndrome       Review of Systems:       Constitutional Negative for fever and chills   HEENT Negative for ear discharge, ear pain, nosebleed, headache, nausea vomiting.  Left facial droop   Eyes Negative for photophobia, pain and discharge   Respiratory Negative for hemoptysis and sputum   Cardiovascular Negative for orthopnea, claudication and PND   Gastrointestinal Negative for abdominal pain, diarrhea, blood in stool   Musculoskeletal Negative for joint pain, negative for myalgia, left sided weakness   Skin Negative for rash or itching   hematology Negative for ecchymosis, anemia   Psychiatric Negative for suicidal ideation, anxiety, depression, hallucinations         Physical Exam:   /67   Pulse 60   Temp 97.3 °F (36.3 °C) (Oral)   Resp 14   SpO2 95%   Temp (24hrs), Av.6 °F (36.4 °C), Min:97 °F (36.1 °C), Max:98.6 °F (37 °C)        General examination:       General Appearance:  alert, well appearing, and in no acute distress  HEENT: Normocephalic, atraumatic, moist mucus membranes  Neck: supple, no carotid bruits, (-) nuchal rigidity  Lungs:  Respirations unlabored, chest wall no deformity, BS normal  Cardiovascular: normal rate, regular rhythm  Abdomen: Soft, nontender, nondistended, normal bowel sounds  Skin: No gross lesions, rashes, bruising or bleeding on exposed skin area  Extremities:  peripheral pulses palpable, clubbing or edema, left sided weakness  Psych: normal affect     NEUROLOGIC EXAMINATION     Mental status    Alert and oriented x 3; following all commands;   speech is fluent, no dysarthria, aphasia.       Cranial nerves    II - visual fields intact to confrontation; pupils reactive  III, IV, VI  extraocular muscles intact; no CARMITA; no nystagmus; no ptosis   V - normal facial sensation                                                               VII - normal facial symmetry                                                             VIII - intact hearing                                                                             IX, X - symmetrical palate elevation                                               XI - symmetrical shoulder shrug                                                       XII - midline tongue without atrophy or fasciculation      Motor function  Strength:   5/5 RUE, 5/5 RLE  4/5 LUE, 4/5  LLE  Normal bulk and tone.     Sensory function Intact to touch, pin, vibration, proprioception throughout      Cerebellar Intact finger-nose-finger testing. Intact heel-shin testing. No dysdiadochokinesia present. No tremors                          Reflex function 2/4 symmetric throughout . Downgoing plantar response bilaterally. (-)Morrow's sign bilaterally       Gait                  Normal station and gait. Normal Tandem, tip toes and heel walking             Diagnostics:      Laboratory Testing:  CBC:   Recent Labs     03/26/22  1325 03/26/22 2055   WBC 7.2 7.6   HGB 14.9 14.1    296     BMP:    Recent Labs     03/26/22  1325 03/26/22  1331 03/26/22 2055     --  136   K 4.2  --  4.1     --  101   CO2 26  --  23   BUN 15  --  12   CREATININE 0.55  --  0.58   GLUCOSE 95 83 75         Lab Results   Component Value Date    CHOL 171 07/12/2021    LDLCHOLESTEROL 98 07/12/2021    HDL 55 07/12/2021    TRIG 92 07/12/2021     (H) 03/26/2022     (H) 03/26/2022    TSH 0.99 01/22/2020    INR 1.0 03/26/2022    LABA1C 5.9 07/12/2021    [id number] 724 01/22/2020       No results found for: PHENYTOIN, PHENYTOIN, VALPROATE, CBMZ            Assessment and plan:      The patient is a 61 y.o. female who has past medical history of CVA on plavix,anxiety, migraine and chronic smoker presents  at [facility] for concern of severe headache, facial weakness and left sided weakness  was transferred to [facility] for further evaluation.     Principal Problem:    Headache  Resolved Problems:    * No resolved hospital problems. *        · CT head WO negative for any acute intracranial abnormalities  · CTA negative for LVO  · Follow with MRI  · Continue plavix.   · Will discuss with attending regarding Lipitor due elevated liver enzyme  · Received single dose Methylprednisolone 125 mg IV, benadryl 25 mg iv, ketorolac 15 mg iv and 500 ml normal saline bolus               Electronically signed by Flory Pepper MD on

## 2022-03-27 NOTE — CONSULTS
[facility]   IN-PATIENT SERVICE      NEUROLOGY CONSULT  NOTE            Date:   3/26/2022  Patient name:  Martinez Rondon  Date of admission:  3/26/2022  YOB: 1960      Chief Complaint:     Chief Complaint   Patient presents with    Headache     top and left side       Reason for Consult:      Headache     History of Present Illness: The patient is a 63 y.o. female who has past medical history of CVA on plavix,anxiety, migraine and chronic smoker presents  at [address] [place] [address] for concern of severe headache and was transferred to [facility] for further evaluation.     She mentioned she started having headache since 12.00 am, which was sharp in nature, 10/10, all over head, not relieved with NSAIDS , associated with photophobia and phonophobia. Her  noted to have left sided facial droop. She went for urgent care and was recommended to go to hospital. She was evaluated in [place] by stroke team( Dr. Franz Deaguru as telestroke) . She was noted to have left extremity weakness and left sided facial droop which has been her her baseline. CTA negative for LVO and CT head was negative for acute intracranial abnormalities. She is then transferred to [facility] for further evaluation. She She did have episode of headache in past but were not as bad as this one and used to relieve with NSAIDs.  Patient also mentioned to have nausea and 2 episode of vomit.     Patient denied for fever, nuchal rigidity, vision changes, sob, chest pain and cough.     Labs reviewed unremarkable except for elevated liver enzyme     Last know well:12.00 am     On presentation:  BP:131/79  BSL:75     Prior to arrival patient was on  Antiplatelets/anticoagulants:plavix  Statins:None        Smoking history: smoker  (1/2 ppd x 45 yrs)      Past Medical History:     Past Medical History:   Diagnosis Date    Anxiety     Cerebrovascular disease     Mini stroke in 2006    CVA (cerebral infarction)     Facial weakness     left    Family history of colon cancer     Gastroesophageal reflux disease without esophagitis 5/31/2016    History of TIAs     Hypoglycemia     Left hemiparesis ([facility] [address]     Memory problem     Migraine     Sleep difficulties     Speech abnormality     Tobacco abuse     Unspecified sleep apnea         Past Surgical History:     Past Surgical History:   Procedure Laterality Date    APPENDECTOMY      CARDIAC CATHETERIZATION  7-2-15    nml    CHOLECYSTECTOMY      COLONOSCOPY  4/29/2015    2 polyps, sigmoid diverticulosis    FOOT SURGERY Right     HYSTERECTOMY      OVARIAN CYST SURGERY      TONSILLECTOMY          Medications Prior to Admission:     Prior to Admission medications    Medication Sig Start Date End Date Taking?  Authorizing Provider   acetaminophen (TYLENOL) 500 MG tablet Take 500 mg by mouth every 6 hours as needed for Pain (pain)   Yes Historical Provider, MD   clopidogrel (PLAVIX) 75 MG tablet Take 1 tablet by mouth daily  Patient not taking: Reported on 3/26/2022 3/17/22   RUSSELL Wilcox   RABEprazole (ACIPHEX) 20 MG tablet TAKE 1 TABLET DAILY  Patient not taking: Reported on 3/26/2022 2/28/22   RUSSELL Wilcox   famotidine (PEPCID) 40 MG tablet TAKE 1 TABLET EVERY EVENING  Patient not taking: Reported on 3/26/2022 2/28/22   RUSSELL Wilcox   solifenacin (VESICARE) 5 MG tablet TAKE 1 TABLET DAILY  Patient not taking: Reported on 3/26/2022 2/28/22   RUSSELL Wilcox   albuterol sulfate HFA (VENTOLIN HFA) 108 (90 Base) MCG/ACT inhaler Inhale 2 puffs into the lungs every 4 hours as needed for Wheezing or Shortness of Breath 1/17/22   Johan Jefferson MD   Spacer/Aero-Holding Yarely [place] 1 Device by Does not apply route daily as needed (use with inhaler)  Patient not taking: Reported on 3/26/2022 1/17/22   Johan Jefferson MD   celecoxib (CELEBREX) 200 MG capsule Take 1 capsule by mouth daily  Patient not taking: Reported on 3/26/2022 12/2/21   RUSSELL Wilcox   Multiple Vitamin ([address] VITAMIN DAILY PO) Take by mouth  Patient not taking: Reported on 3/26/2022    Historical Provider, MD   ibuprofen (ADVIL;MOTRIN) 600 MG tablet Take 1 tablet by mouth every 8 hours as needed for Pain 6/25/21   Mayito Craft MD   mometasone (NASONEX) 50 MCG/ACT nasal spray Two sprays to each nostril once daily. Patient not taking: Reported on 3/26/2022 6/15/21   RUSSELL Bangura   Cyanocobalamin (VITAMIN B 12 PO) Take by mouth  Patient not taking: Reported on 3/26/2022    Historical Provider, MD   Cholecalciferol (VITAMIN D3 PO) Take by mouth daily     Historical Provider, MD        Allergies:     Tape Albaro Piles tape]    Social History:     Tobacco:    reports that she has been smoking. She has a 10.00 pack-year smoking history. She has never used smokeless tobacco.  Alcohol:      reports no history of alcohol use. Drug Use:  reports no history of drug use. Family History:     Family History   Problem Relation Age of Onset    Diabetes Mother     Heart Disease Father         afib    High Blood Pressure Father     Diabetes Maternal Grandmother     Cancer Maternal Grandmother         breast    Cancer Maternal Grandfather         brain    Heart Disease Paternal Grandfather     Diabetes Sister     Thyroid Disease Sister     Mental Retardation Sister     Cancer Brother         colon, prostate, lung    Diabetes Brother     Diabetes Brother     Other Brother         hyperglycemia    Heart Disease Brother         atrial fibrillation    Other Other         Jack Syndrome/Jack Syndrome    Other Grandchild         2nd Grandson with Hunters Syndrome       Review of Systems:       Constitutional Negative for fever and chills   HEENT Negative for ear discharge, ear pain, nosebleed, headache, nausea vomiting.  Left facial droop   Eyes Negative for photophobia, pain and discharge   Respiratory Negative for hemoptysis and sputum   Cardiovascular Negative for orthopnea, claudication and PND   Gastrointestinal Negative for abdominal pain, diarrhea, blood in stool   Musculoskeletal Negative for joint pain, negative for myalgia, left sided weakness   Skin Negative for rash or itching   hematology Negative for ecchymosis, anemia   Psychiatric Negative for suicidal ideation, anxiety, depression, hallucinations       Physical Exam:   /67   Pulse 60   Temp 97.3 °F (36.3 °C) (Oral)   Resp 14   SpO2 95%   Temp (24hrs), Av.6 °F (36.4 °C), Min:97 °F (36.1 °C), Max:98.6 °F (37 °C)        General examination:      General Appearance:  alert, well appearing, and in no acute distress  HEENT: Normocephalic, atraumatic, moist mucus membranes  Neck: supple, no carotid bruits, (-) nuchal rigidity  Lungs:  Respirations unlabored, chest wall no deformity, BS normal  Cardiovascular: normal rate, regular rhythm  Abdomen: Soft, nontender, nondistended, normal bowel sounds  Skin: No gross lesions, rashes, bruising or bleeding on exposed skin area  Extremities:  peripheral pulses palpable, clubbing or edema, left sided weakness  Psych: normal affect    NEUROLOGIC EXAMINATION    Mental status   Alert and oriented x 3; following all commands;   speech is fluent, no dysarthria, aphasia. Cranial nerves   II - visual fields intact to confrontation; pupils reactive  III, IV, VI  extraocular muscles intact; no CARMITA; no nystagmus; no ptosis   V - normal facial sensation                                                               VII - normal facial symmetry                                                             VIII - intact hearing                                                                             IX, X - symmetrical palate elevation                                               XI - symmetrical shoulder shrug                                                       XII - midline tongue without atrophy or fasciculation     Motor function  Strength:   5/5 RUE, 5/5 RLE  4/5 LUE, 4/5  LLE  Normal bulk and tone.       Sensory function Intact to touch, pin, vibration, proprioception throughout     Cerebellar Intact finger-nose-finger testing. Intact heel-shin testing. No dysdiadochokinesia present. No tremors                        Reflex function 2/4 symmetric throughout . Downgoing plantar response bilaterally. (-)Morrow's sign bilaterally      Gait                  Normal station and gait. Normal Tandem, tip toes and heel walking             Diagnostics:      Laboratory Testing:  CBC:   Recent Labs     03/26/22  1325 03/26/22 2055   WBC 7.2 7.6   HGB 14.9 14.1    296     BMP:    Recent Labs     03/26/22  1325 03/26/22  1331 03/26/22 2055     --  136   K 4.2  --  4.1     --  101   CO2 26  --  23   BUN 15  --  12   CREATININE 0.55  --  0.58   GLUCOSE 95 83 75         Lab Results   Component Value Date    CHOL 171 07/12/2021    LDLCHOLESTEROL 98 07/12/2021    HDL 55 07/12/2021    TRIG 92 07/12/2021     (H) 03/26/2022     (H) 03/26/2022    TSH 0.99 01/22/2020    INR 1.0 03/26/2022    LABA1C 5.9 07/12/2021    [id number] 724 01/22/2020       No results found for: PHENYTOIN, PHENYTOIN, VALPROATE, CBMZ          Assessment and plan:      The patient is a 63 y.o. female who has past medical history of CVA on plavix,anxiety, migraine and chronic smoker presents  at [facility]  for concern of severe headache, facial weakness and left sided weakness  was transferred to [facility] for further evaluation. Principal Problem:    Headache  Resolved Problems:    * No resolved hospital problems. *       CT head WO negative for any acute intracranial abnormalities   CTA negative for LVO   Follow with MRI   Continue plavix.    Will discuss with attending regarding Lipitor due elevated liver enzyme   Received single dose Methylprednisolone 125 mg IV, benadryl 25 mg iv, ketorolac 15 mg iv and 500 ml normal saline bolus        Electronically signed by Leonor Youssef MD on 3/26/2022 at 11:46 PM      Savannah Wharton MD   PGY 2 Internal Medicine Resident  3/26/2022 at 11:46 PM

## 2022-03-27 NOTE — ED PROVIDER NOTES
Elva Cloud  Emergency Department Encounter  EmergencyMedicine Resident     Pt Name:Billie Solis  MRN: [id number]  [place] 1960  Date of evaluation: 3/27/22  PCP:  Jason Carter, [address] [place] [address]       Chief Complaint   Patient presents with    Headache     top and left side       HISTORY OF PRESENT ILLNESS  (Location/Symptom, Timing/Onset, Context/Setting, Quality, Duration, Modifying Factors, Severity.)      Cindi Julio is a 63 y.o. female who presents with headache, weakness. Patient arrived as transfer from outlying facility for concern for acute on chronic CVA. Patient with history of prior CVA with residual left-sided deficits but reports some worsening of weakness gradually over the past day and headache which started today at midnight. Patient was transferred here for neurology evaluation. Does report some mild headache at this time but no worsening numbness or weakness    PAST MEDICAL / SURGICAL / SOCIAL / FAMILY HISTORY      has a past medical history of Anxiety, Cerebrovascular disease, CVA (cerebral infarction), Facial weakness, Family history of colon cancer, Gastroesophageal reflux disease without esophagitis, History of TIAs, Hypoglycemia, Left hemiparesis ([facility] [address] Memory problem, Migraine, Sleep difficulties, Speech abnormality, Tobacco abuse, and Unspecified sleep apnea. has a past surgical history that includes Appendectomy; Hysterectomy; Ovarian cyst surgery; Tonsillectomy; Colonoscopy (4/29/2015); Cardiac catheterization (7-2-15); Foot surgery (Right); and Cholecystectomy.       Social History     Socioeconomic History    Marital status:      Spouse name: Not on file    Number of children: Not on file    Years of education: Not on file    Highest education level: Not on file   Occupational History     Employer: NONE   Tobacco Use    Smoking status: Current Every Day Smoker     Packs/day: 0.50     Years: 20.00     Pack years: 10.00    Smokeless tobacco: Never Used   Vaping Use    Vaping Use: Never used   Substance and Sexual Activity    Alcohol use: No     Alcohol/week: 0.0 standard drinks    Drug use: No    Sexual activity: Not on file   Other Topics Concern    Not on file   Social History Narrative    Not on file     Social Determinants of Health     Financial Resource Strain: Low Risk     Difficulty of Paying Living Expenses: Not hard at all   Food Insecurity: No Food Insecurity    Worried About [address] in the Last Year: Never true    [address] [facility] in the Last Year: Never true   Transportation Needs:     Lack of Transportation (Medical): Not on file    Lack of Transportation (Non-Medical):  Not on file   Physical Activity:     Days of Exercise per Week: Not on file    Minutes of Exercise per Session: Not on file   Stress:     Feeling of Stress : Not on file   Social Connections:     Frequency of Communication with Friends and Family: Not on file    Frequency of Social Gatherings with Friends and Family: Not on file    Attends Latter day Services: Not on file    Active Member of [address] [place] [address] or Organizations: Not on file    Attends Club or Organization Meetings: Not on file    Marital Status: Not on file   Intimate Partner Violence:     Fear of Current or Ex-Partner: Not on file    Emotionally Abused: Not on file    Physically Abused: Not on file    Sexually Abused: Not on file   Housing Stability:     Unable to Pay for Housing in the Last Year: Not on file    Number of [place] in the Last Year: Not on file    Unstable Housing in the Last Year: Not on file       Family History   Problem Relation Age of Onset    Diabetes Mother     Heart Disease Father         afib    High Blood Pressure Father     Diabetes Maternal Grandmother     Cancer Maternal Grandmother         breast    Cancer Maternal Grandfather         brain    Heart Disease Paternal Grandfather     Diabetes Sister     Thyroid Disease Sister    Ruiz Mental Retardation Sister     Cancer Brother         colon, prostate, lung    Diabetes Brother     Diabetes Brother     Other Brother         hyperglycemia    Heart Disease Brother         atrial fibrillation    Other Other         Jack Syndrome/Jack Syndrome    Other Grandchild         2nd Grandson with Hunters Syndrome       Allergies:  Tape [adhesive tape]    Home Medications:  Prior to Admission medications    Medication Sig Start Date End Date Taking?  Authorizing Provider   acetaminophen (TYLENOL) 500 MG tablet Take 500 mg by mouth every 6 hours as needed for Pain (pain)   Yes Historical Provider, MD   clopidogrel (PLAVIX) 75 MG tablet Take 1 tablet by mouth daily  Patient not taking: Reported on 3/26/2022 3/17/22   Gricelda Po, PA   RABEprazole (ACIPHEX) 20 MG tablet TAKE 1 TABLET DAILY  Patient not taking: Reported on 3/26/2022 2/28/22   Gricelda Po, PA   famotidine (PEPCID) 40 MG tablet TAKE 1 TABLET EVERY EVENING  Patient not taking: Reported on 3/26/2022 2/28/22   Gricelda Po, PA   solifenacin (VESICARE) 5 MG tablet TAKE 1 TABLET DAILY  Patient not taking: Reported on 3/26/2022 2/28/22   Gricelda Po, PA   albuterol sulfate HFA (VENTOLIN HFA) 108 (90 Base) MCG/ACT inhaler Inhale 2 puffs into the lungs every 4 hours as needed for Wheezing or Shortness of Breath 1/17/22   Roxana Hutchinson MD   Spacer/Aero-Holding Rhunette Hy 1 Device by Does not apply route daily as needed (use with inhaler)  Patient not taking: Reported on 3/26/2022 1/17/22   Roxana Hutchinson MD   celecoxib (CELEBREX) 200 MG capsule Take 1 capsule by mouth daily  Patient not taking: Reported on 3/26/2022 12/2/21   Gricelda Po, PA   Multiple Vitamin (MULTI VITAMIN DAILY PO) Take by mouth  Patient not taking: Reported on 3/26/2022    Historical Provider, MD   ibuprofen (ADVIL;MOTRIN) 600 MG tablet Take 1 tablet by mouth every 8 hours as needed for Pain 6/25/21   Frank Alvarez MD   mometasone (NASONEX) 50 MCG/ACT nasal spray Two sprays to each nostril once daily. Patient not taking: Reported on 3/26/2022 6/15/21   RUSSELL Atkins   Cyanocobalamin (VITAMIN B 12 PO) Take by mouth  Patient not taking: Reported on 3/26/2022    Historical Provider, MD   Cholecalciferol (VITAMIN D3 PO) Take by mouth daily     Historical Provider, MD       REVIEW OF SYSTEMS    (2-9 systems for level 4, 10 or more for level 5)      Review of Systems   Constitutional: Negative for fatigue and fever. Eyes: Negative for photophobia and visual disturbance. Respiratory: Negative for cough, shortness of breath and wheezing. Cardiovascular: Negative for chest pain. Gastrointestinal: Negative for nausea and vomiting. Genitourinary: Negative for dysuria and flank pain. Musculoskeletal: Negative for back pain. Skin: Negative for rash and wound. Neurological: Positive for weakness and headaches. Negative for seizures, syncope and numbness. Psychiatric/Behavioral: Negative for confusion. PHYSICAL EXAM   (up to 7 for level 4, 8 or more for level 5)      INITIAL VITALS:   /67   Pulse 60   Temp 97.3 °F (36.3 °C) (Oral)   Resp 14   Ht 5' 4\" (1.626 m)   Wt 159 lb 4.8 oz (72.3 kg)   SpO2 95%   BMI 27.34 kg/m²     Physical Exam  Constitutional:       General: She is not in acute distress. Appearance: She is not toxic-appearing. HENT:      Head: Normocephalic and atraumatic. Cardiovascular:      Rate and Rhythm: Normal rate. Heart sounds: No murmur heard. No friction rub. No gallop. Pulmonary:      Breath sounds: No wheezing, rhonchi or rales. Abdominal:      Tenderness: There is no abdominal tenderness. There is no guarding or rebound. Musculoskeletal:      Right lower leg: No edema. Left lower leg: No edema. Skin:     Findings: No bruising, lesion or rash. Neurological:      Motor: Weakness (4/5 strength in left upper and left lower extremity. Mild left facial droop) present. DIFFERENTIAL  DIAGNOSIS     PLAN (LABS / IMAGING / EKG):  Orders Placed This Encounter   Procedures    MRI BRAIN WO CONTRAST    STROKE PANEL    Basic Metabolic Panel w/ Reflex to MG    CBC with Auto Differential    Diet NPO    Vital signs per unit routine    Up with assistance    Intake and output    Neurovascular checks    Elevate Head of Bed     Telemetry monitoring - continuous duration    Full Code    Inpatient consult to Stroke Team    Inpatient consult to Case Management    OT eval and treat    PT evaluation and treat    Initiate Oxygen Therapy Protocol    EKG Rhythm Strip    ADMIT TO INPATIENT       MEDICATIONS ORDERED:  Orders Placed This Encounter   Medications    albuterol sulfate  (90 Base) MCG/ACT inhaler 2 puff     Order Specific Question:   Initiate RT Bronchodilator Protocol     Answer:   No    clopidogrel (PLAVIX) tablet 75 mg    sodium chloride flush 0.9 % injection 5-40 mL    sodium chloride flush 0.9 % injection 5-40 mL    0.9 % sodium chloride infusion    enoxaparin (LOVENOX) injection 40 mg    OR Linked Order Group     ondansetron (ZOFRAN-ODT) disintegrating tablet 4 mg     ondansetron (ZOFRAN) injection 4 mg    polyethylene glycol (GLYCOLAX) packet 17 g    OR Linked Order Group     acetaminophen (TYLENOL) tablet 650 mg     acetaminophen (TYLENOL) suppository 650 mg    methylPREDNISolone sodium (SOLU-MEDROL) injection 125 mg    ketorolac (TORADOL) injection 15 mg    0.9 % sodium chloride bolus    diphenhydrAMINE (BENADRYL) injection 25 mg       DDX: TIA, CVA, subarachnoid hemorrhage, subdural hematoma, complex migraine    DIAGNOSTIC RESULTS / EMERGENCY DEPARTMENT COURSE / MDM   LAB RESULTS:  Results for orders placed or performed during the hospital encounter of 03/26/22   STROKE PANEL   Result Value Ref Range    Glucose 75 70 - 99 mg/dL    BUN 12 6 - 20 mg/dL    CREATININE 0.58 0.50 - 0.90 mg/dL    Calcium 9.2 8.6 - 10.4 mg/dL    Sodium 136 135 - 144 mmol/L    Potassium 4.1 3.7 - 5.3 mmol/L    Chloride 101 98 - 107 mmol/L    CO2 23 20 - 31 mmol/L    Anion Gap 12 9 - 17 mmol/L    GFR Non-African American >60 >60 mL/min    GFR African American >60 >60 mL/min    GFR Comment          WBC 7.6 3.5 - 11.3 k/uL    RBC 4.64 3.95 - 5.11 m/uL    Hemoglobin 14.1 11.9 - 15.1 g/dL    Hematocrit 42.6 36.3 - 47.1 %    MCV 91.8 82.6 - 102.9 fL    MCH 30.4 25.2 - 33.5 pg    MCHC 33.1 28.4 - 34.8 g/dL    RDW 14.0 11.8 - 14.4 %    Platelets 832 883 - 132 k/uL    MPV 9.5 8.1 - 13.5 fL    NRBC Automated 0.0 0.0 per 100 WBC    Total CK 45 26 - 192 U/L    Seg Neutrophils 45 36 - 65 %    Lymphocytes 46 (H) 24 - 43 %    Monocytes 6 3 - 12 %    Eosinophils % 2 1 - 4 %    Basophils 1 0 - 2 %    Immature Granulocytes 0 0 %    Segs Absolute 3.42 1.50 - 8.10 k/uL    Absolute Lymph # 3.40 1.10 - 3.70 k/uL    Absolute Mono # 0.47 0.10 - 1.20 k/uL    Absolute Eos # 0.17 0.00 - 0.44 k/uL    Basophils Absolute 0.07 0.00 - 0.20 k/uL    Absolute Immature Granulocyte <0.03 0.00 - 0.30 k/uL    Myoglobin 27 25 - 58 ng/mL    Protime 10.4 9.1 - 12.3 sec    INR 1.0     PTT 23.9 20.5 - 30.5 sec    Troponin, High Sensitivity <6 0 - 14 ng/L       IMPRESSION/ ED Course: 70-year-old female no acute distress presenting with headache, weakness. Patient with some weakness of left upper and lower extremity compared to right side as well as some left-sided facial droop but patient states that these deficits are chronic. Does report headache at this time. CT head and CTA head and neck unremarkable at outlying facility.   Neurology consulted here and plans for admission for management    RADIOLOGY:  CT HEAD WO CONTRAST    Result Date: 3/26/2022  EXAMINATION: CT OF THE HEAD WITHOUT CONTRAST  3/26/2022 10:30 am TECHNIQUE: CT of the head was performed without the administration of intravenous contrast. Dose modulation, iterative reconstruction, and/or weight based adjustment of the mA/kV was utilized to reduce the radiation dose to as low as reasonably achievable. COMPARISON: 10/27/2019 HISTORY: ORDERING SYSTEM PROVIDED HISTORY: facial droop TECHNOLOGIST PROVIDED HISTORY: facial droop Decision Support Exception - unselect if not a suspected or confirmed emergency medical condition->Emergency Medical Condition (MA) Reason for Exam: New onset left facial droop, history of TIA FINDINGS: BRAIN/VENTRICLES: There is no acute intracranial hemorrhage, mass effect or midline shift. No abnormal extra-axial fluid collection. The gray-white differentiation is maintained without evidence of an acute infarct. There is no evidence of hydrocephalus. ORBITS: The visualized portion of the orbits demonstrate no acute abnormality. SINUSES: The visualized paranasal sinuses and mastoid air cells demonstrate no acute abnormality. SOFT TISSUES/SKULL:  No acute abnormality of the visualized skull or soft tissues. No acute intracranial abnormality. The findings were sent to the Radiology Results [address] at 1:44 am on 3/26/2022to be communicated to a licensed caregiver. XR CHEST PORTABLE    Result Date: 3/26/2022  EXAM: XR Chest, 1 View EXAM DATE/TIME: 3/26/2022 1:37 pm CLINICAL HISTORY: ORDERING SYSTEM PROVIDED  Stroke  TECHNOLOGIST PROVIDED HISTORY:  Stroke Reason for Exam: Possible stroke, headache, facial droop TECHNIQUE: Frontal view of the chest. COMPARISON: 09/29/2015 FINDINGS: Lungs:  No acute findings. No consolidation. Pleural space:  No acute findings. No pneumothorax. Heart:  No acute findings. No cardiomegaly. Mediastinum:  No acute findings. Bones/joints:  No acute findings. No acute findings in the chest.     CTA HEAD NECK W CONTRAST    Result Date: 3/26/2022  EXAMINATION: CTA OF THE HEAD AND NECK WITH CONTRAST 3/26/2022 2:11 pm: TECHNIQUE: CTA of the head and neck was performed with the administration of intravenous contrast. Multiplanar reformatted images are provided for review.   MIP images are provided for review. Stenosis of the internal carotid arteries measured using NASCET criteria. Dose modulation, iterative reconstruction, and/or weight based adjustment of the mA/kV was utilized to reduce the radiation dose to as low as reasonably achievable. 3D reconstructed images were performed on a separate workstation and provided for review. This scan was analyzed using [employer]. ai contact LVO. Identification of suspected findings is not for diagnostic use beyond notification. Viz LVO is limited to analysis of imaging data and should not be used in-lieu of full patient evaluation or relied upon to make or confirm diagnosis. COMPARISON: 07/10/2014. HISTORY: ORDERING SYSTEM PROVIDED HISTORY: Stroke left facial droop, left-sided weakness, slurred speech TECHNOLOGIST PROVIDED HISTORY: Stroke left facial droop, left-sided weakness, slurred speech Decision Support Exception - unselect if not a suspected or confirmed emergency medical condition->Emergency Medical Condition (MA) Reason for Exam: Stroke left facial droop, left-sided weakness, slurred speech FINDINGS: CTA NECK: AORTIC ARCH/ARCH VESSELS: No dissection or arterial injury. No significant stenosis of the brachiocephalic or subclavian arteries. CAROTID ARTERIES: No dissection, arterial injury, or hemodynamically significant stenosis by NASCET criteria. VERTEBRAL ARTERIES: No dissection, arterial injury, or significant stenosis. SOFT TISSUES: The lung apices are clear. No cervical or superior mediastinal lymphadenopathy. The larynx and pharynx are unremarkable. No acute abnormality of the salivary and thyroid glands. BONES: No acute osseous abnormality. Multilevel cervical spondylosis, most notable C5-C6. CTA HEAD: ANTERIOR CIRCULATION: No significant stenosis of the intracranial internal carotid, anterior cerebral, or middle cerebral arteries. No aneurysm.  POSTERIOR CIRCULATION: No significant stenosis of the vertebral, basilar, or posterior cerebral arteries. No aneurysm. OTHER: No dural venous sinus thrombosis on this non-dedicated study. Similar-appearing increased venous vasculature near the left M1 distribution. BRAIN: No mass effect or midline shift. No evidence of large vessel occlusion, significant stenosis, dissection, or aneurysmal dilation in the major arteries of the head and neck. EKG  EKG Interpretation    Interpreted by me    Rhythm: normal sinus   Rate: normal  Axis: normal  Ectopy: none  Conduction: normal  ST Segments: no acute change  T Waves: no acute change  Q Waves: none    Clinical Impression: Nonspecific EKG    All EKG's are interpreted by the Emergency Department Physician who either signs or Co-signs this chart in the absence of a cardiologist.      CONSULTS:  IP CONSULT TO STROKE TEAM  IP CONSULT TO CASE MANAGEMENT    CRITICAL CARE:  Please see attending note    FINAL IMPRESSION      1. Acute nonintractable headache, unspecified headache type          DISPOSITION / PLAN     DISPOSITION Admitted 03/26/2022 09:56:51 PM      PATIENT REFERRED TO:  No follow-up provider specified.     DISCHARGE MEDICATIONS:  Current Discharge Medication List          Rick Gomez DO  Emergency Medicine Resident    (Please note that portions of thisnote were completed with a voice recognition program.  Efforts were made to edit the dictations but occasionally words are mis-transcribed.)        Rick Gomez DO  Resident  03/27/22 3540

## 2022-03-27 NOTE — PLAN OF CARE
Problem: Pain:  Goal: Pain level will decrease  Description: Pain level will decrease  3/27/2022 0940 by Guilherme Callahan RN  Outcome: Ongoing  3/27/2022 0131 by Xiao Barbosa RN  Outcome: Ongoing  Goal: Control of acute pain  Description: Control of acute pain  3/27/2022 0940 by Guilherme Callahan RN  Outcome: Ongoing  3/27/2022 0131 by Xiao Barbosa RN  Outcome: Ongoing  Goal: Control of chronic pain  Description: Control of chronic pain  3/27/2022 0940 by Guilherme Callahan RN  Outcome: Ongoing  3/27/2022 0131 by Xiao Barbosa RN  Outcome: Ongoing     Problem: Falls - Risk of:  Goal: Will remain free from falls  Description: Will remain free from falls  3/27/2022 0940 by Guilherme Callahan RN  Outcome: Ongoing  3/27/2022 0131 by Xiao Barbosa RN  Outcome: Ongoing  Goal: Absence of physical injury  Description: Absence of physical injury  3/27/2022 0940 by Guilherme Callahan RN  Outcome: Ongoing  3/27/2022 0131 by Xiao Barbosa RN  Outcome: Ongoing

## 2022-03-28 DIAGNOSIS — I63.9 CEREBROVASCULAR ACCIDENT (CVA), UNSPECIFIED MECHANISM (HCC): ICD-10-CM

## 2022-03-28 RX ORDER — CLOPIDOGREL BISULFATE 75 MG/1
TABLET ORAL
Qty: 90 TABLET | Refills: 0 | OUTPATIENT
Start: 2022-03-28

## 2022-03-28 NOTE — DISCHARGE SUMMARY
Neurology Discharge Summary     Patient ID: Walter Rush  :  1960   MRN: [id number]     ACCOUNT:  [de-identified]   Patient's PCP: Mylinda Hammans, PA  Admit Date: 3/26/2022   Discharge Date: 3/27/2022  Length of Stay: 1  Code Status:  Prior  Admitting Physician: Clarissa Reyes, DO  Discharge Physician: Mauricio Calix MD     Active Discharge Diagnoses:       Primary Problem  Headache      [place] Problems    Diagnosis Date Noted    Headache [R51.9] 2022     Condition on the discharge: good with headache improved     Hospital Stay:       Hospital Course:  Walter Rush is a 63 y.o. female presented with acute severe holoacranial headache, 10 out of 10 not relieved with NSAIDs and associated with photophobia and photophobia and left sided facial droop. Patient has past medical history of CVA on Plavix with baseline left sided weakness, anxiety, migraine that has been controlled with no episodes of migraine for the past few years. She went for urgent care and was recommended to go to the hospital and was evaluated in [place] by stroke team was noted to have left extremity weakness and left-sided facial droop which has been her baseline as stated. CT head and CTA were negative for acute findings. Patient was transferred to to [facility] for further evaluation MRI brain was negative for acute intracranial findings. Patient was prescribed migraine cocktail in addition to Depakote 500 mg IV 1 dose. Patient reported improvement of her headache and was discharged awake alert and oriented. Of note, her liver function test was elevated with , , alkaline phosphatase 107.    She was instructed to repeat LFTs which she did on 3/30/2022 and showed ALT 60, AST 15, alkaline phosphatase 78 with normal bilirubin       Significant Diagnostic Studies:   Labs / Micro:  CBC:   Lab Results   Component Value Date    WBC 7.1 2022    RBC 4.89 2022    HGB 14.9 03/27/2022    HCT 45.3 03/27/2022    MCV 92.6 03/27/2022    MCH 30.5 03/27/2022    MCHC 32.9 03/27/2022    RDW 13.8 03/27/2022     03/27/2022     BMP:    Lab Results   Component Value Date    GLUCOSE 122 03/27/2022     03/27/2022    K 4.1 03/27/2022     03/27/2022    CO2 19 03/27/2022    ANIONGAP 14 03/27/2022    BUN 15 03/27/2022    CREATININE 0.55 03/27/2022    BUNCRER 27 03/26/2022    CALCIUM 9.1 03/27/2022    LABGLOM >60 03/27/2022    GFRAA >60 03/27/2022    GFR      03/27/2022     CMP:    Lab Results   Component Value Date    GLUCOSE 122 03/27/2022     03/27/2022    K 4.1 03/27/2022     03/27/2022    CO2 19 03/27/2022    BUN 15 03/27/2022    CREATININE 0.55 03/27/2022    ANIONGAP 14 03/27/2022    ALKPHOS 78 03/30/2022    ALT 60 03/30/2022    AST 15 03/30/2022    BILITOT 0.37 03/30/2022    LABALBU 4.8 03/30/2022    ALBUMIN 1.9 03/30/2022    LABGLOM >60 03/27/2022    GFRAA >60 03/27/2022    GFR      03/27/2022    PROT 7.3 03/30/2022    CALCIUM 9.1 03/27/2022         Radiology:    CT HEAD WO CONTRAST    Result Date: 3/26/2022  EXAMINATION: CT OF THE HEAD WITHOUT CONTRAST  3/26/2022 10:30 am TECHNIQUE: CT of the head was performed without the administration of intravenous contrast. Dose modulation, iterative reconstruction, and/or weight based adjustment of the mA/kV was utilized to reduce the radiation dose to as low as reasonably achievable. COMPARISON: 10/27/2019 HISTORY: ORDERING SYSTEM PROVIDED HISTORY: facial droop TECHNOLOGIST PROVIDED HISTORY: facial droop Decision Support Exception - unselect if not a suspected or confirmed emergency medical condition->Emergency Medical Condition (MA) Reason for Exam: New onset left facial droop, history of TIA FINDINGS: BRAIN/VENTRICLES: There is no acute intracranial hemorrhage, mass effect or midline shift. No abnormal extra-axial fluid collection. The gray-white differentiation is maintained without evidence of an acute infarct.   There is no evidence of hydrocephalus. ORBITS: The visualized portion of the orbits demonstrate no acute abnormality. SINUSES: The visualized paranasal sinuses and mastoid air cells demonstrate no acute abnormality. SOFT TISSUES/SKULL:  No acute abnormality of the visualized skull or soft tissues. No acute intracranial abnormality. The findings were sent to the Radiology Results [address] at 1:44 am on 3/26/2022to be communicated to a licensed caregiver. XR CHEST PORTABLE    Result Date: 3/26/2022  EXAM: XR Chest, 1 View EXAM DATE/TIME: 3/26/2022 1:37 pm CLINICAL HISTORY: ORDERING SYSTEM PROVIDED  Stroke  TECHNOLOGIST PROVIDED HISTORY:  Stroke Reason for Exam: Possible stroke, headache, facial droop TECHNIQUE: Frontal view of the chest. COMPARISON: 09/29/2015 FINDINGS: Lungs:  No acute findings. No consolidation. Pleural space:  No acute findings. No pneumothorax. Heart:  No acute findings. No cardiomegaly. Mediastinum:  No acute findings. Bones/joints:  No acute findings. No acute findings in the chest.     CTA HEAD NECK W CONTRAST    Result Date: 3/26/2022  EXAMINATION: CTA OF THE HEAD AND NECK WITH CONTRAST 3/26/2022 2:11 pm: TECHNIQUE: CTA of the head and neck was performed with the administration of intravenous contrast. Multiplanar reformatted images are provided for review. MIP images are provided for review. Stenosis of the internal carotid arteries measured using NASCET criteria. Dose modulation, iterative reconstruction, and/or weight based adjustment of the mA/kV was utilized to reduce the radiation dose to as low as reasonably achievable. 3D reconstructed images were performed on a separate workstation and provided for review. This scan was analyzed using Viz. ai contact LVO. Identification of suspected findings is not for diagnostic use beyond notification.  Viz LVO is limited to analysis of imaging data and should not be used in-lieu of full patient evaluation or relied upon to make or confirm diagnosis. COMPARISON: 07/10/2014. HISTORY: ORDERING SYSTEM PROVIDED HISTORY: Stroke left facial droop, left-sided weakness, slurred speech TECHNOLOGIST PROVIDED HISTORY: Stroke left facial droop, left-sided weakness, slurred speech Decision Support Exception - unselect if not a suspected or confirmed emergency medical condition->Emergency Medical Condition (MA) Reason for Exam: Stroke left facial droop, left-sided weakness, slurred speech FINDINGS: CTA NECK: AORTIC ARCH/ARCH VESSELS: No dissection or arterial injury. No significant stenosis of the brachiocephalic or subclavian arteries. CAROTID ARTERIES: No dissection, arterial injury, or hemodynamically significant stenosis by NASCET criteria. VERTEBRAL ARTERIES: No dissection, arterial injury, or significant stenosis. SOFT TISSUES: The lung apices are clear. No cervical or superior mediastinal lymphadenopathy. The larynx and pharynx are unremarkable. No acute abnormality of the salivary and thyroid glands. BONES: No acute osseous abnormality. Multilevel cervical spondylosis, most notable C5-C6. CTA HEAD: ANTERIOR CIRCULATION: No significant stenosis of the intracranial internal carotid, anterior cerebral, or middle cerebral arteries. No aneurysm. POSTERIOR CIRCULATION: No significant stenosis of the vertebral, basilar, or posterior cerebral arteries. No aneurysm. OTHER: No dural venous sinus thrombosis on this non-dedicated study. Similar-appearing increased venous vasculature near the left M1 distribution. BRAIN: No mass effect or midline shift. No evidence of large vessel occlusion, significant stenosis, dissection, or aneurysmal dilation in the major arteries of the head and neck.      MRI BRAIN WO CONTRAST    Result Date: 3/27/2022  EXAMINATION: MRI OF THE BRAIN WITHOUT CONTRAST  3/27/2022 10:47 am TECHNIQUE: Multiplanar multisequence MRI of the brain was performed without the administration of intravenous contrast. COMPARISON: No MRI comparison available. HISTORY: ORDERING SYSTEM PROVIDED HISTORY: rule out new stroke TECHNOLOGIST PROVIDED HISTORY: rule out new stroke What is the sedation requirement?->None Decision Support Exception - unselect if not a suspected or confirmed emergency medical condition->Emergency Medical Condition (MA) Reason for Exam: rule out new stroke FINDINGS: INTRACRANIAL STRUCTURES/VENTRICLES: There is no acute infarct. No mass effect or midline shift. No evidence of an acute intracranial hemorrhage. Few scattered punctate white matter T2/FLAIR hyperintensities are nonspecific. The ventricles and sulci are normal in size and configuration. The sellar/suprasellar regions appear unremarkable. The normal signal voids within the major intracranial vessels appear maintained. ORBITS: The visualized portion of the orbits demonstrate no acute abnormality. SINUSES: Partial opacification of the left ethmoid sinus. Mastoid air cells appear clear. BONES/SOFT TISSUES: The bone marrow signal intensity appears normal. The soft tissues demonstrate no acute abnormality. No acute intracranial abnormality. Specifically, no evidence of acute infarct. Consultations:    Consults:     Final Specialist Recommendations/Findings:   IP CONSULT TO STROKE TEAM  IP CONSULT TO CASE MANAGEMENT      The patient was seen and examined on day of discharge and this discharge summary is in conjunction with any daily progress note from day of discharge.     Discharge plan:       Disposition: Home    Physician Follow Up:     Maria Eugenia Benjamin [address]  [phone]    In 6 weeks         Requiring Further Evaluation/Follow Up POST HOSPITALIZATION/Incidental Findings:     Diet: regular diet    Activity: As tolerated    Instructions to Patient:  - Take all medications as prescribed  - Follow up with PCP   - In case of any worsening condition please visit Emergency Room. Restrictions: Driving No, Swimming No, Operating heavy machinery No, Compromising heights No      Discharge Medications:      Medication List      CONTINUE taking these medications    acetaminophen 500 MG tablet  Commonly known as: TYLENOL     albuterol sulfate  (90 Base) MCG/ACT inhaler  Commonly known as: Ventolin HFA  Inhale 2 puffs into the lungs every 4 hours as needed for Wheezing or Shortness of Breath     celecoxib 200 MG capsule  Commonly known as: CeleBREX  Take 1 capsule by mouth daily     clopidogrel 75 MG tablet  Commonly known as: PLAVIX  Take 1 tablet by mouth daily     famotidine 40 MG tablet  Commonly known as: PEPCID  TAKE 1 TABLET EVERY EVENING     ibuprofen 600 MG tablet  Commonly known as: ADVIL;MOTRIN  Take 1 tablet by mouth every 8 hours as needed for Pain     mometasone 50 MCG/ACT nasal spray  Commonly known as: Nasonex  Two sprays to each nostril once daily. MULTI VITAMIN DAILY PO     RABEprazole 20 MG tablet  Commonly known as: ACIPHEX  TAKE 1 TABLET DAILY     solifenacin 5 MG tablet  Commonly known as: VESICARE  TAKE 1 TABLET DAILY     Spacer/Aero-Holding Chambers Tash  1 Device by Does not apply route daily as needed (use with inhaler)     VITAMIN B 12 PO     VITAMIN D3 PO            Time Spent on discharge is  31 mins in patient examination, evaluation, counseling as well as medication reconciliation, prescriptions for required medications, discharge plan and follow up. Electronically signed by   Henry Merlin, MD  3/28/2022  4:39 PM      Thank you RUSSELL Barbosa for the opportunity to be involved in this patient's care.

## 2022-03-30 ENCOUNTER — HOSPITAL ENCOUNTER (OUTPATIENT)
Dept: LAB | Age: 62
Discharge: HOME | End: 2022-03-30
Payer: COMMERCIAL

## 2022-03-30 ENCOUNTER — TELEPHONE (OUTPATIENT)
Dept: FAMILY MEDICINE CLINIC | Age: 62
End: 2022-03-30

## 2022-03-30 DIAGNOSIS — R74.8 ELEVATED LIVER ENZYMES: ICD-10-CM

## 2022-03-30 DIAGNOSIS — R73.01 IFG (IMPAIRED FASTING GLUCOSE): ICD-10-CM

## 2022-03-30 DIAGNOSIS — I63.9 CEREBROVASCULAR ACCIDENT (CVA), UNSPECIFIED MECHANISM (HCC): ICD-10-CM

## 2022-03-30 DIAGNOSIS — Z00.00 ANNUAL PHYSICAL EXAM: ICD-10-CM

## 2022-03-30 DIAGNOSIS — I63.9 CEREBROVASCULAR ACCIDENT (CVA), UNSPECIFIED MECHANISM (HCC): Primary | ICD-10-CM

## 2022-03-30 LAB
ALBUMIN SERPL-MCNC: 4.8 G/DL (ref 3.5–5.2)
ALBUMIN/GLOBULIN RATIO: 1.9 (ref 1–2.5)
ALP BLD-CCNC: 78 U/L (ref 35–104)
ALT SERPL-CCNC: 60 U/L (ref 5–33)
AST SERPL-CCNC: 15 U/L
BILIRUB SERPL-MCNC: 0.37 MG/DL (ref 0.3–1.2)
BILIRUBIN DIRECT: <0.08 MG/DL
BILIRUBIN, INDIRECT: ABNORMAL MG/DL (ref 0–1)
CHOLESTEROL/HDL RATIO: 3.1
CHOLESTEROL: 190 MG/DL
EKG ATRIAL RATE: 68 BPM
EKG P AXIS: 59 DEGREES
EKG P-R INTERVAL: 154 MS
EKG Q-T INTERVAL: 402 MS
EKG QRS DURATION: 68 MS
EKG QTC CALCULATION (BAZETT): 427 MS
EKG R AXIS: 9 DEGREES
EKG T AXIS: 36 DEGREES
EKG VENTRICULAR RATE: 68 BPM
ESTIMATED AVERAGE GLUCOSE: 114 MG/DL
GLOBULIN: 2.5 G/DL (ref 1.5–3.8)
HBA1C MFR BLD: 5.6 % (ref 4–6)
HDLC SERPL-MCNC: 62 MG/DL
LDL CHOLESTEROL: 106 MG/DL (ref 0–130)
TOTAL PROTEIN: 7.3 G/DL (ref 6.4–8.3)
TRIGL SERPL-MCNC: 108 MG/DL

## 2022-03-30 PROCEDURE — 80061 LIPID PANEL: CPT

## 2022-03-30 PROCEDURE — 80076 HEPATIC FUNCTION PANEL: CPT

## 2022-03-30 PROCEDURE — 83036 HEMOGLOBIN GLYCOSYLATED A1C: CPT

## 2022-03-30 PROCEDURE — 93010 ELECTROCARDIOGRAM REPORT: CPT | Performed by: INTERNAL MEDICINE

## 2022-03-30 PROCEDURE — 36415 COLL VENOUS BLD VENIPUNCTURE: CPT

## 2022-03-30 NOTE — TELEPHONE ENCOUNTER
[facility] Transitions Initial Follow Up Call    Outreach made within 2 business days of discharge: Yes    Patient: Renee Barnes Patient : 1960   MRN: [id number]  Reason for Admission: There are no discharge diagnoses documented for the most recent discharge. Discharge Date: 3/27/22       Spoke with: Darion Bernabe     Discharge department/facility: [facility]    TCM Interactive Patient Contact:  Was patient able to fill all prescriptions: Yes  Was patient instructed to bring all medications to the follow-up visit: Yes  Is patient taking all medications as directed in the discharge summary? Yes  Does patient understand their discharge instructions: Yes  Does patient have questions or concerns that need addressed prior to 7-14 day follow up office visit: yes -continues  with headache and they mentioned at hospital needs MRI. Will discuss at [address]    Scheduled appointment with PCP within 7-14 days    Follow Up  Future Appointments   Date Time Provider Kateryna Amador   2022  3:20 PM Marlene Smith   2022  1:40 PM RUSSELL Smith DANIELA Ariza LPN

## 2022-04-01 ENCOUNTER — TELEPHONE (OUTPATIENT)
Dept: FAMILY MEDICINE CLINIC | Age: 62
End: 2022-04-01

## 2022-04-01 ENCOUNTER — OFFICE VISIT (OUTPATIENT)
Dept: FAMILY MEDICINE CLINIC | Age: 62
End: 2022-04-01
Payer: COMMERCIAL

## 2022-04-01 VITALS
HEIGHT: 64 IN | HEART RATE: 72 BPM | BODY MASS INDEX: 27.31 KG/M2 | DIASTOLIC BLOOD PRESSURE: 78 MMHG | WEIGHT: 160 LBS | SYSTOLIC BLOOD PRESSURE: 120 MMHG

## 2022-04-01 DIAGNOSIS — Z86.73 HX-TIA (TRANSIENT ISCHEMIC ATTACK): ICD-10-CM

## 2022-04-01 DIAGNOSIS — M54.2 NECK PAIN: ICD-10-CM

## 2022-04-01 DIAGNOSIS — M19.072 ARTHRITIS OF BOTH FEET: ICD-10-CM

## 2022-04-01 DIAGNOSIS — M19.071 ARTHRITIS OF BOTH FEET: ICD-10-CM

## 2022-04-01 DIAGNOSIS — R51.9 INTRACTABLE HEADACHE, UNSPECIFIED CHRONICITY PATTERN, UNSPECIFIED HEADACHE TYPE: Primary | ICD-10-CM

## 2022-04-01 DIAGNOSIS — R29.810 FACIAL WEAKNESS: ICD-10-CM

## 2022-04-01 DIAGNOSIS — R51.9 ACUTE INTRACTABLE HEADACHE, UNSPECIFIED HEADACHE TYPE: Primary | ICD-10-CM

## 2022-04-01 PROCEDURE — 1111F DSCHRG MED/CURRENT MED MERGE: CPT | Performed by: PHYSICIAN ASSISTANT

## 2022-04-01 PROCEDURE — 99495 TRANSJ CARE MGMT MOD F2F 14D: CPT | Performed by: PHYSICIAN ASSISTANT

## 2022-04-01 RX ORDER — ASCORBIC ACID 500 MG
500 TABLET ORAL DAILY
COMMUNITY

## 2022-04-01 RX ORDER — DIPHENHYDRAMINE HCL 25 MG
25 CAPSULE ORAL EVERY 6 HOURS PRN
COMMUNITY

## 2022-04-01 RX ORDER — CELECOXIB 200 MG/1
200 CAPSULE ORAL DAILY
Qty: 90 CAPSULE | Refills: 0 | Status: SHIPPED | OUTPATIENT
Start: 2022-04-01 | End: 2022-07-05 | Stop reason: SDUPTHER

## 2022-04-01 ASSESSMENT — ENCOUNTER SYMPTOMS: RESPIRATORY NEGATIVE: 1

## 2022-04-01 NOTE — TELEPHONE ENCOUNTER
Pt will call our office back on Monday with info for a neurologist at [address] [place] [address] Dr XIONG in [place] that she seen and would like to go back to to when she calls on Monday we will need the info to make an external referral to neurology.

## 2022-04-01 NOTE — PROGRESS NOTES
She was transferred from [facility] ER to [address] [place] [address]. Patient was evaluated by neurology and ruled out for acute CVA. She admits to persistent neck pain. Neurology recommended an MRI of c-spine. Patient has a history TIA in the past.  Patient admits to a significant amount of work. She has been working 7 days weekly for the past 2.5 years. She states that sleep is interrupted. She denies any additional concerns or complaints today. Inpatient course: Discharge summary reviewed- see chart. Interval history/Current status: reviewed    Patient Active Problem List   Diagnosis    [facility] [address]    Hx-TIA (transient ischemic attack)    Migraine    Tobacco abuse    Family history of colon cancer    Facial weakness    Memory problem    Anxiety    Sleep difficulties    VA (obstructive sleep apnea)    Left hemiparesis (HCC)    Gastroesophageal reflux disease without esophagitis    Hordeolum externum of right upper eyelid    Preseptal cellulitis of right upper eyelid    Squamous blepharitis of upper and lower eyelids of both eyes    Headache    Stroke-like episode       Medications listed as ordered at the time of discharge from hospital     Medication List          Accurate as of April 1, 2022  4:52 PM. If you have any questions, ask your nurse or doctor.             CONTINUE taking these medications    acetaminophen 500 MG tablet  Commonly known as: TYLENOL     albuterol sulfate  (90 Base) MCG/ACT inhaler  Commonly known as: Ventolin HFA  Inhale 2 puffs into the lungs every 4 hours as needed for Wheezing or Shortness of Breath     celecoxib 200 MG capsule  Commonly known as: CeleBREX  Take 1 capsule by mouth daily     clopidogrel 75 MG tablet  Commonly known as: PLAVIX  Take 1 tablet by mouth daily     diphenhydrAMINE 25 MG capsule  Commonly known as: BENADRYL     famotidine 40 MG tablet  Commonly known as: PEPCID  TAKE 1 TABLET EVERY EVENING     ibuprofen 600 MG tablet  Commonly known as: ADVIL;MOTRIN  Take 1 tablet by mouth every 8 hours as needed for Pain     mometasone 50 MCG/ACT nasal spray  Commonly known as: Nasonex  Two sprays to each nostril once daily.      MULTI VITAMIN DAILY PO     PROBIOTIC-10 PO     RABEprazole 20 MG tablet  Commonly known as: ACIPHEX  TAKE 1 TABLET DAILY     solifenacin 5 MG tablet  Commonly known as: VESICARE  TAKE 1 TABLET DAILY     Spacer/Aero-Holding Kathalene Honor  1 Device by Does not apply route daily as needed (use with inhaler)     VITAMIN B 12 PO     vitamin C 500 MG tablet  Commonly known as: ASCORBIC ACID     VITAMIN D3 PO           Where to Get Your Medications      These medications were sent to [facility], [address] [facility] [phone] - F [phone]  [place] [address]    Phone: [phone]   · celecoxib 200 MG capsule          Medications marked \"taking\" at this time  Outpatient Medications Marked as Taking for the 4/1/22 encounter (Office Visit) with RUSSELL Franco   Medication Sig Dispense Refill    diphenhydrAMINE (BENADRYL) 25 MG capsule Take 25 mg by mouth every 6 hours as needed for Itching      Probiotic Product (PROBIOTIC-10 PO) Take by mouth      vitamin C (ASCORBIC ACID) 500 MG tablet Take 500 mg by mouth daily      celecoxib (CELEBREX) 200 MG capsule Take 1 capsule by mouth daily 90 capsule 0    acetaminophen (TYLENOL) 500 MG tablet Take 500 mg by mouth every 6 hours as needed for Pain (pain)      clopidogrel (PLAVIX) 75 MG tablet Take 1 tablet by mouth daily 90 tablet 0    RABEprazole (ACIPHEX) 20 MG tablet TAKE 1 TABLET DAILY 90 tablet 1    famotidine (PEPCID) 40 MG tablet TAKE 1 TABLET EVERY EVENING 90 tablet 1    solifenacin (VESICARE) 5 MG tablet TAKE 1 TABLET DAILY 90 tablet 1    albuterol sulfate HFA (VENTOLIN HFA) 108 (90 Base) MCG/ACT inhaler Inhale 2 puffs into the lungs every 4 hours as needed for Wheezing or Shortness of Breath 18 g 0    Cyanocobalamin (VITAMIN B 12 PO) Take by mouth       Cholecalciferol (VITAMIN D3 PO) Take by mouth daily           Medications patient taking as of now reconciled against medications ordered at time of hospital discharge: Yes    Review of Systems   Constitutional: Negative. HENT: Negative. Respiratory: Negative. Cardiovascular: Negative. Musculoskeletal: Positive for neck pain. Neurological: Positive for headaches. Psychiatric/Behavioral: Positive for agitation. Objective:    /78 (Site: Left Upper Arm, Position: Sitting, Cuff Size: Large Adult)   Pulse 72   Ht 5' 4\" (1.626 m)   Wt 160 lb (72.6 kg)   BMI 27.46 kg/m²   Physical Exam  HENT:      Head: Normocephalic. Right Ear: Tympanic membrane normal.      Left Ear: Tympanic membrane normal.      Mouth/Throat:      Mouth: Mucous membranes are moist.   Eyes:      Pupils: Pupils are equal, round, and reactive to light. Cardiovascular:      Rate and Rhythm: Normal rate. Pulmonary:      Effort: Pulmonary effort is normal.      Breath sounds: Normal breath sounds. Skin:     General: Skin is warm and dry. Neurological:      General: No focal deficit present. Mental Status: She is alert and oriented to person, place, and time. Comments: Left facial droop         An electronic signature was used to authenticate this note.   --RUSSELL Jones

## 2022-04-04 NOTE — TELEPHONE ENCOUNTER
----- Message from Anahi Rangel sent at 4/4/2022  8:30 AM EDT -----  Subject: Referral Request    QUESTIONS   Reason for referral request? Patient called back with the information on   the Neurologist she wants to see. Has the physician seen you for this condition before? No   Preferred Specialist (if applicable)? Do you already have an appointment scheduled? No  Additional Information for Provider? Dr Peng Wesley [address] [place] [id number] [phone]  ---------------------------------------------------------------------------  --------------  [address]  What is the best way for the office to contact you? OK to leave message on   voicemail  Preferred Call Back Phone Number? [phone]  ---------------------------------------------------------------------------  --------------  SCRIPT ANSWERS  Relationship to Patient?  Self

## 2022-04-06 ENCOUNTER — TELEPHONE (OUTPATIENT)
Dept: FAMILY MEDICINE CLINIC | Age: 62
End: 2022-04-06

## 2022-04-06 NOTE — LETTER
[facility] department of [place] [address]  Phone: [phone]  Fax: [phone]    Marlene Cadet        April 6, 2022     Patient: Wu Huertas   YOB: 1960   Date of Visit: 4/6/2022       To Whom It May Concern: It is my medical opinion that Sara Gates should remain off work thru 5/12/2022    If you have any questions or concerns, please don't hesitate to call.     Sincerely,        RUSSELL Cadet/[place]

## 2022-04-08 ENCOUNTER — HOSPITAL ENCOUNTER (OUTPATIENT)
Dept: MRI IMAGING | Age: 62
Discharge: HOME | End: 2022-04-10
Payer: COMMERCIAL

## 2022-04-08 DIAGNOSIS — R51.9 ACUTE INTRACTABLE HEADACHE, UNSPECIFIED HEADACHE TYPE: ICD-10-CM

## 2022-04-08 DIAGNOSIS — M54.2 NECK PAIN: ICD-10-CM

## 2022-04-08 PROCEDURE — 72141 MRI NECK SPINE W/O DYE: CPT

## 2022-04-11 ENCOUNTER — TELEPHONE (OUTPATIENT)
Dept: FAMILY MEDICINE CLINIC | Age: 62
End: 2022-04-11

## 2022-04-11 DIAGNOSIS — M48.02 CERVICAL STENOSIS OF SPINE: Primary | ICD-10-CM

## 2022-04-14 ENCOUNTER — TELEPHONE (OUTPATIENT)
Dept: FAMILY MEDICINE CLINIC | Age: 62
End: 2022-04-14

## 2022-04-14 NOTE — TELEPHONE ENCOUNTER
questioning if can go ti chiropractor as trouble turning moving head. Advised not to go to chiropractor until talks with neurosurgeon and number provided,Alsbrandi questionns medical leave and did not get disability check and sent her to insurance office to discus paper work

## 2022-04-14 NOTE — TELEPHONE ENCOUNTER
----- Message from Matti Federica sent at 4/13/2022  4:32 PM EDT -----  Subject: Message to Provider    QUESTIONS  Information for Provider? Pt was told to call in today if she had not   heard from the neuro surgeon. Pt stated she has not heard anything and is   still in a lot of pain.   ---------------------------------------------------------------------------  --------------  CALL BACK INFO  What is the best way for the office to contact you? OK to leave message on   voicemail  Preferred Call Back Phone Number? [phone]  ---------------------------------------------------------------------------  --------------  SCRIPT ANSWERS  Relationship to Patient?  Self

## 2022-04-20 ENCOUNTER — OFFICE VISIT (OUTPATIENT)
Dept: FAMILY MEDICINE CLINIC | Age: 62
End: 2022-04-20
Payer: COMMERCIAL

## 2022-04-20 VITALS
HEART RATE: 70 BPM | BODY MASS INDEX: 27.83 KG/M2 | SYSTOLIC BLOOD PRESSURE: 120 MMHG | DIASTOLIC BLOOD PRESSURE: 62 MMHG | WEIGHT: 163 LBS | HEIGHT: 64 IN

## 2022-04-20 DIAGNOSIS — M48.02 CERVICAL SPINAL STENOSIS: Primary | ICD-10-CM

## 2022-04-20 DIAGNOSIS — K21.9 GASTROESOPHAGEAL REFLUX DISEASE WITHOUT ESOPHAGITIS: ICD-10-CM

## 2022-04-20 DIAGNOSIS — R51.9 NONINTRACTABLE HEADACHE, UNSPECIFIED CHRONICITY PATTERN, UNSPECIFIED HEADACHE TYPE: ICD-10-CM

## 2022-04-20 PROCEDURE — 99213 OFFICE O/P EST LOW 20 MIN: CPT | Performed by: PHYSICIAN ASSISTANT

## 2022-04-20 ASSESSMENT — ENCOUNTER SYMPTOMS
ABDOMINAL PAIN: 0
HEARTBURN: 0
SINUS PRESSURE: 0
RESPIRATORY NEGATIVE: 1
PHOTOPHOBIA: 0
TROUBLE SWALLOWING: 0

## 2022-04-20 ASSESSMENT — PATIENT HEALTH QUESTIONNAIRE - PHQ9
SUM OF ALL RESPONSES TO PHQ QUESTIONS 1-9: 11
SUM OF ALL RESPONSES TO PHQ QUESTIONS 1-9: 11
2. FEELING DOWN, DEPRESSED OR HOPELESS: 0
4. FEELING TIRED OR HAVING LITTLE ENERGY: 3
6. FEELING BAD ABOUT YOURSELF - OR THAT YOU ARE A FAILURE OR HAVE LET YOURSELF OR YOUR FAMILY DOWN: 3
10. IF YOU CHECKED OFF ANY PROBLEMS, HOW DIFFICULT HAVE THESE PROBLEMS MADE IT FOR YOU TO DO YOUR WORK, TAKE CARE OF THINGS AT HOME, OR GET ALONG WITH OTHER PEOPLE: 1
3. TROUBLE FALLING OR STAYING ASLEEP: 3
8. MOVING OR SPEAKING SO SLOWLY THAT OTHER PEOPLE COULD HAVE NOTICED. OR THE OPPOSITE, BEING SO FIGETY OR RESTLESS THAT YOU HAVE BEEN MOVING AROUND A LOT MORE THAN USUAL: 1
SUM OF ALL RESPONSES TO PHQ QUESTIONS 1-9: 11
1. LITTLE INTEREST OR PLEASURE IN DOING THINGS: 0
5. POOR APPETITE OR OVEREATING: 0
SUM OF ALL RESPONSES TO PHQ QUESTIONS 1-9: 11
7. TROUBLE CONCENTRATING ON THINGS, SUCH AS READING THE NEWSPAPER OR WATCHING TELEVISION: 1
9. THOUGHTS THAT YOU WOULD BE BETTER OFF DEAD, OR OF HURTING YOURSELF: 0
SUM OF ALL RESPONSES TO PHQ9 QUESTIONS 1 & 2: 0

## 2022-04-20 NOTE — PROGRESS NOTES
Subjective:      Patient ID: Jovanni Argueta is a 63 y.o. female. Neck Pain   This is a chronic problem. The current episode started more than 1 month ago. The pain is present in the midline. The quality of the pain is described as aching and burning. Associated symptoms include headaches. Pertinent negatives include no leg pain, photophobia or trouble swallowing. She has tried NSAIDs for the symptoms. Headache   This is a chronic problem. The current episode started more than 1 month ago. Associated symptoms include neck pain. Pertinent negatives include no abdominal pain, anorexia, phonophobia, photophobia or sinus pressure. Gastroesophageal Reflux  She reports no abdominal pain or no heartburn. This is a chronic problem. The current episode started more than 1 year ago. She has tried a PPI for the symptoms. The treatment provided significant relief. Review of Systems   Constitutional: Negative. HENT: Negative for sinus pressure and trouble swallowing. Eyes: Negative for photophobia. Respiratory: Negative. Cardiovascular: Negative. Gastrointestinal: Negative for abdominal pain, anorexia and heartburn. Musculoskeletal: Positive for neck pain. Neurological: Positive for headaches.      Past Medical History:   Diagnosis Date    Anxiety     Cerebrovascular disease     Mini stroke in 2006    CVA (cerebral infarction)     Facial weakness     left    Family history of colon cancer     Gastroesophageal reflux disease without esophagitis 5/31/2016    History of TIAs     Hypoglycemia     Left hemiparesis ([facility] [address]     Memory problem     Migraine     Sleep difficulties     Speech abnormality     Tobacco abuse     Unspecified sleep apnea      Past Surgical History:   Procedure Laterality Date    APPENDECTOMY      CARDIAC CATHETERIZATION  7-2-15    nml    CHOLECYSTECTOMY      COLONOSCOPY  4/29/2015    2 polyps, sigmoid diverticulosis    FOOT SURGERY Right     HYSTERECTOMY      OVARIAN CYST SURGERY      TONSILLECTOMY       Social History     Tobacco Use    Smoking status: Current Every Day Smoker     Packs/day: 0.50     Years: 20.00     Pack years: 10.00    Smokeless tobacco: Never Used   Vaping Use    Vaping Use: Never used   Substance Use Topics    Alcohol use: No     Alcohol/week: 0.0 standard drinks    Drug use: No       Objective:   Physical Exam  HENT:      Head: Normocephalic. Right Ear: Tympanic membrane normal.      Left Ear: Tympanic membrane normal.      Mouth/Throat:      Mouth: Mucous membranes are moist.   Eyes:      Pupils: Pupils are equal, round, and reactive to light. Cardiovascular:      Rate and Rhythm: Normal rate. Pulmonary:      Effort: Pulmonary effort is normal.      Breath sounds: Normal breath sounds. Abdominal:      General: Abdomen is flat. Musculoskeletal:      Cervical back: Spasms and tenderness present. Lumbar back: Tenderness present. Neurological:      General: No focal deficit present. Mental Status: She is alert. Psychiatric:         Mood and Affect: Mood normal.         Assessment:      1. Cervical spinal stenosis    2. Gastroesophageal reflux disease without esophagitis    3. Nonintractable headache, unspecified chronicity pattern, unspecified headache type          Plan:      Interval history reviewed with patient. Keep scheduled appointments with neurology and neurosurgery. Healthy diet and routine exercise. Continue current medications. Patient declines vaccinations. Follow-up in six months/sooner PRN.         RUSSELL Dunn

## 2022-04-20 NOTE — PATIENT INSTRUCTIONS
Patient Education        Cervical Spinal Stenosis: Care Instructions  Your Care Instructions     Spinal stenosis is a narrowing of the canal that surrounds the spinal cord and nerve roots. Sometimes bone and other tissue grow into this canal and press on the nerves that branch out from the spinal cord. This can happen as a part ofaging. When the narrowing happens in your neck, it's called cervical spinal stenosis. It often causes stiffness, pain, numbness, and weakness in the neck, shoulders, arms, hands, or legs. It can even cause problems with your balance,coordination, and bowel or bladder control. But some people have no symptoms. You may be able to get relief from the symptoms of spinal stenosis by taking pain medicine. Your doctor may suggest physical therapy and exercises to keep your spine strong and flexible. Some people try steroid shots to reduce swelling. If pain and numbness in your neck, arms, or legs are still so badthat you cannot do your normal activities, you may need surgery. Follow-up care is a key part of your treatment and safety. Be sure to make and go to all appointments, and call your doctor if you are having problems. It's also a good idea to know your test results and keep alist of the medicines you take. How can you care for yourself at home?  Ask your doctor if you can take an over-the-counter pain medicine, such as acetaminophen (Tylenol), ibuprofen (Advil, Motrin), or naproxen (Aleve). Be safe with medicines. Read and follow all instructions on the label.  Do not take two or more pain medicines at the same time unless the doctor told you to. Many pain medicines have acetaminophen, which is Tylenol. Too much acetaminophen (Tylenol) can be harmful.  Change positions often when you are standing or sitting. This may reduce pressure on the spinal cord and its nerves.  When you rest, use pillows or towel rolls to support your neck and head in a comfortable position.    Follow your doctor's instructions about activity. He or she may tell you not to do sports or activities that could injure your neck.  Stretch your neck and shoulders as your doctor or physical therapist recommends. If your doctor says it is okay to do them, these exercises may help:  ? Neck stretches to the side. Keep your shoulders relaxed and slowly tilt your head straight over toward one shoulder. Hold for 15 seconds. Let the weight of your head stretch your muscles. Then do the same toward the other shoulder. ? Neck rotations. Keep your chin level and slowly turn your head to one side. Hold for 15 seconds. Then do the same to the other side. ? Shoulder rolls. Roll your shoulders up, then back, and then down in a smooth, circular motion. Repeat several times. When should you call for help? Call 911 anytime you think you may need emergency care. For example, call if:     You are unable to move an arm or a leg at all. Call your doctor now or seek immediate medical care if:     You have new or worse symptoms in your arms, legs, belly, or buttocks. Symptoms may include:  ? Numbness or tingling. ? Weakness. ? Pain.      You lose bladder or bowel control. Watch closely for changes in your health, and be sure to contact your doctor if:     You do not get better as expected. Where can you learn more? Go to https://[employer]. org and sign in to your "Shah[employer] Tabat[employer]ai[employer]" account. Enter  in the [employer] box to learn more about \"Cervical Spinal Stenosis: Care Instructions. \"     If you do not have an account, please click on the \"Sign Up Now\" link. Current as of: July 1, 2021               Content Version: 13.2  © 2677-9574 [employer]. Care instructions adapted under license by [facility] ([facility]).  If you have questions about a medical condition or this instruction, always ask your healthcare professional. [address]  any warranty or liability for your use of this information.

## 2022-05-11 ENCOUNTER — OFFICE VISIT (OUTPATIENT)
Dept: NEUROLOGY | Age: 62
End: 2022-05-11
Payer: COMMERCIAL

## 2022-05-11 ENCOUNTER — HOSPITAL ENCOUNTER (OUTPATIENT)
Dept: GENERAL RADIOLOGY | Age: 62
Discharge: HOME | End: 2022-05-13
Payer: COMMERCIAL

## 2022-05-11 ENCOUNTER — OFFICE VISIT (OUTPATIENT)
Dept: NEUROSURGERY | Age: 62
End: 2022-05-11
Payer: COMMERCIAL

## 2022-05-11 ENCOUNTER — HOSPITAL ENCOUNTER (OUTPATIENT)
Age: 62
Discharge: HOME | End: 2022-05-13
Payer: COMMERCIAL

## 2022-05-11 VITALS
OXYGEN SATURATION: 95 % | HEART RATE: 79 BPM | HEIGHT: 64 IN | WEIGHT: 166 LBS | BODY MASS INDEX: 28.34 KG/M2 | DIASTOLIC BLOOD PRESSURE: 77 MMHG | SYSTOLIC BLOOD PRESSURE: 124 MMHG | TEMPERATURE: 98.1 F

## 2022-05-11 VITALS
DIASTOLIC BLOOD PRESSURE: 79 MMHG | HEART RATE: 79 BPM | HEIGHT: 64 IN | TEMPERATURE: 98.1 F | RESPIRATION RATE: 18 BRPM | WEIGHT: 166 LBS | BODY MASS INDEX: 28.34 KG/M2 | SYSTOLIC BLOOD PRESSURE: 124 MMHG | OXYGEN SATURATION: 95 %

## 2022-05-11 DIAGNOSIS — M47.812 CERVICAL SPONDYLOSIS: ICD-10-CM

## 2022-05-11 DIAGNOSIS — R51.9 NONINTRACTABLE HEADACHE, UNSPECIFIED CHRONICITY PATTERN, UNSPECIFIED HEADACHE TYPE: Primary | ICD-10-CM

## 2022-05-11 DIAGNOSIS — G56.02 LEFT CARPAL TUNNEL SYNDROME: ICD-10-CM

## 2022-05-11 DIAGNOSIS — M47.812 CERVICAL SPONDYLOSIS: Primary | ICD-10-CM

## 2022-05-11 PROCEDURE — 72040 X-RAY EXAM NECK SPINE 2-3 VW: CPT

## 2022-05-11 PROCEDURE — 99214 OFFICE O/P EST MOD 30 MIN: CPT | Performed by: STUDENT IN AN ORGANIZED HEALTH CARE EDUCATION/TRAINING PROGRAM

## 2022-05-11 PROCEDURE — 99204 OFFICE O/P NEW MOD 45 MIN: CPT | Performed by: NURSE PRACTITIONER

## 2022-05-11 RX ORDER — RIZATRIPTAN BENZOATE 10 MG/1
10 TABLET ORAL
Qty: 10 TABLET | Refills: 0 | Status: SHIPPED | OUTPATIENT
Start: 2022-05-11 | End: 2022-05-11

## 2022-05-11 RX ORDER — LANOLIN ALCOHOL/MO/W.PET/CERES
400 CREAM (GRAM) TOPICAL DAILY
Qty: 30 TABLET | Refills: 3 | Status: SHIPPED | OUTPATIENT
Start: 2022-05-11

## 2022-05-11 NOTE — PROGRESS NOTES
[address]  [facility] [address], [address] [facility] [address]  Dept: [phone]    Patient:  Blanca Echevarria  YOB: 1960  Date: 5/11/22    The patient is a 63 y.o. female who presents today for consult of the following problems:     Chief Complaint   Patient presents with    New Patient     cervical stenosis of spine          HPI:     Blanca Echevarria is a 63 y.o. female on whom neurosurgical consultation was requested by RUSSELL Whitney for management of neck pain and arm symptoms. Was recently in the hospitalized for new onset headache. Does have a history significant for stroke with some left sided residual weakness. Has noticed that she has had some stiffness to her neck lately. Will have occasional numbness to one of her legs (doesn't remember which) after standing for a while, has to move around for it to resolve. Will have numbness to left hand intermittently, usually to middle 3 digits. Doesn't hold coffee cups in left hand. Will wake up occasionally with numbness present, has attributed it to position. Denies any right upper extremity symptoms. No distinct issues with hand clumsiness, dexterity issues.     Nocturnal Awakening: Yes  Reason: Numbness      History:     Past Medical History:   Diagnosis Date    Anxiety     Cerebrovascular disease     Mini stroke in 2006    CVA (cerebral infarction)     Facial weakness     left    Family history of colon cancer     Gastroesophageal reflux disease without esophagitis 5/31/2016    History of TIAs     Hypoglycemia     Left hemiparesis ([address]     Memory problem     Migraine     Sleep difficulties     Speech abnormality     Tobacco abuse     Unspecified sleep apnea      Past Surgical History:   Procedure Laterality Date    APPENDECTOMY      CARDIAC CATHETERIZATION  7-2-15    nml    CHOLECYSTECTOMY      COLONOSCOPY  4/29/2015    2 polyps, sigmoid diverticulosis    FOOT SURGERY Right     HYSTERECTOMY      OVARIAN CYST SURGERY      TONSILLECTOMY       Family History   Problem Relation Age of Onset    Diabetes Mother     Heart Disease Father         afib    High Blood Pressure Father     Diabetes Sister     Thyroid Disease Sister     Mental Retardation Sister     Cancer Brother         colon, prostate, lung    Diabetes Brother     Diabetes Brother     Other Brother         hyperglycemia    Heart Disease Brother         atrial fibrillation    Diabetes Maternal Grandmother     Cancer Maternal Grandmother         breast    Cancer Maternal Grandfather         brain    Heart Disease Paternal Grandfather     Other Grandson         2nd Grandson with Hunters Syndrome    Other Grandson         Jack Syndrome/Jack Syndrome     Current Outpatient Medications on File Prior to Visit   Medication Sig Dispense Refill    rizatriptan (MAXALT) 10 MG tablet Take 1 tablet by mouth once as needed for Migraine May repeat in 2 hours if needed 10 tablet 0    magnesium oxide (MAG-OX) 400 (240 Mg) MG tablet Take 1 tablet by mouth daily 30 tablet 3    diphenhydrAMINE (BENADRYL) 25 MG capsule Take 25 mg by mouth every 6 hours as needed for Itching      Probiotic Product (PROBIOTIC-10 PO) Take by mouth      vitamin C (ASCORBIC ACID) 500 MG tablet Take 500 mg by mouth daily      celecoxib (CELEBREX) 200 MG capsule Take 1 capsule by mouth daily 90 capsule 0    acetaminophen (TYLENOL) 500 MG tablet Take 500 mg by mouth every 6 hours as needed for Pain (pain)      clopidogrel (PLAVIX) 75 MG tablet Take 1 tablet by mouth daily 90 tablet 0    RABEprazole (ACIPHEX) 20 MG tablet TAKE 1 TABLET DAILY 90 tablet 1    famotidine (PEPCID) 40 MG tablet TAKE 1 TABLET EVERY EVENING 90 tablet 1    solifenacin (VESICARE) 5 MG tablet TAKE 1 TABLET DAILY 90 tablet 1    Cyanocobalamin (VITAMIN B 12 PO) Take by mouth       Cholecalciferol (VITAMIN D3 PO) Take by mouth daily        No current facility-administered medications on file prior to visit. Social History     Tobacco Use    Smoking status: Current Every Day Smoker     Packs/day: 0.50     Years: 20.00     Pack years: 10.00    Smokeless tobacco: Never Used   Vaping Use    Vaping Use: Never used   Substance Use Topics    Alcohol use: No     Alcohol/week: 0.0 standard drinks    Drug use: No       Allergies   Allergen Reactions    Tape [Adhesive Tape] Rash       Review of Systems  Constitutional: Negative for activity change and appetite change. HENT: Negative for ear pain and facial swelling. Eyes: Negative for discharge and itching. Respiratory: Negative for choking and chest tightness. Cardiovascular: Negative for chest pain and leg swelling. Gastrointestinal: Negative for nausea and abdominal pain. Endocrine: Negative for cold intolerance and heat intolerance. Genitourinary: Negative for frequency and flank pain. Musculoskeletal: Negative for myalgias and joint swelling. Skin: Negative for rash and wound. Allergic/Immunologic: Negative for environmental allergies and food allergies. Hematological: Negative for adenopathy. Does not bruise/bleed easily. Psychiatric/Behavioral: Negative for self-injury. The patient is not nervous/anxious. Physical Exam:      /77   Pulse 79   Temp 98.1 °F (36.7 °C)   Ht 5' 4\" (1.626 m)   Wt 166 lb (75.3 kg)   SpO2 95%   BMI 28.49 kg/m²   Estimated body mass index is 28.49 kg/m² as calculated from the following:    Height as of this encounter: 5' 4\" (1.626 m). Weight as of this encounter: 166 lb (75.3 kg). General:  Christiano Valero is a 63y.o. year old female who appears her stated age. HEENT: Normocephalic atraumatic. Neck supple. Chest: regular rate; pulses equal  Abdomen: Soft nontender nondistended.    Ext: DP and PT pulses 2+, good cap refill  Neuro    Mentation  Appropriate affect  Registration intact  Orientation intact  Judgment intact to situation    Cranial Nerves:   Pupils equal and reactive to light  Extraocular motion intact  Face and shrug symmetric  Tongue midline  No dysarthria  v1-3 sensation symmetric, masseter tone symmetric  Hearing symmetric and intact    Sensation: Decreased to left middle and ring finger  Ring finger split: Negative    Motor  Right upper and lower extremity 5/5  Left upper and lower extremity 4+/5    Reflexes  L Brachioradialis 2+/4; R brachioradialis 2+/4  L Biceps 2+/4; R Biceps 2+/4  L Triceps 2+/4; R Triceps 2+/4  L Patellar 2+/4: R Patellar 2+/4  L Achilles 2+/4; R Achilles 2+/4    hoffmans L: neg  hoffmans R: neg  Clonus L: neg  Clonus R: neg  Babinski L: neg  Babinski R: neg    Spurlings: No  Lhermittes: No    Tinels at elbow: Yesleft side  Tinels at wrist: Yesleft side  Phalens: Yesleft side  Ok sign: No  Froments: No  Benedictine Hand: No    Atrophy of thenar: No  Atrophy of hypothenar: No    Studies Review:     MRI cervical spine 4/8/2022 (images reviewed): FINDINGS:   BONES/ALIGNMENT: There is normal alignment of the spine. The vertebral body   heights are maintained. The bone marrow signal appears unremarkable.       SPINAL CORD: No abnormal cord signal is seen.       SOFT TISSUES: No paraspinal mass identified.       C2-C3: There is no significant disc protrusion, spinal canal stenosis or   neural foraminal narrowing.       C3-C4: There is no significant disc protrusion, spinal canal stenosis or   neural foraminal narrowing.       C4-C5: Posterior disc osteophyte complex with mild spinal canal stenosis.    Uncovertebral facet DJD with moderate bilateral neural foraminal narrowing.       C5-C6: Posterior disc osteophyte complex and ligamentum flavum hypertrophy   with moderate spinal canal stenosis.  Uncovertebral facet DJD with moderate   bilateral neural foraminal narrowing.       C6-C7: No significant spinal canal or left neural foraminal narrowing.  Mild   right neural foraminal stenosis.       C7-T1: There is no significant disc protrusion, spinal canal stenosis or   neural foraminal narrowing. Neurology notes reviewed    Assessment and Plan:      1. Cervical spondylosis    2. Left carpal tunnel syndrome          Plan: We will obtain upright flexion and extension x-rays to evaluate for any instability. Referral provided for initiation of physical therapy to work on stretching, strengthening, range of motion, TENS, ultrasound. Patient also fitted with left carpal tunnel brace to start wearing while sleeping to see if this provides any improvement to obtain numbness and tingling. We will see the patient back in 10 to 12 weeks for reevaluation. Followup: Return in about 12 weeks (around 8/3/2022), or if symptoms worsen or fail to improve. Prescriptions Ordered:  No orders of the defined types were placed in this encounter. Orders Placed:  Orders Placed This Encounter   Procedures    XR CERVICAL SPINE FLEXION AND EXTENSION     Standing Status:   Future     Number of Occurrences:   1     Standing Expiration Date:   8/9/2022     Order Specific Question:   Reason for exam:     Answer:   eval for instability    Ambulatory referral to Physical Therapy     Referral Priority:   Routine     Referral Type:   Eval and Treat     Referral Reason:   Specialty Services Required     Requested Specialty:   Physical Therapy     Number of Visits Requested:   1        Electronically signed by STEF Navarro CNP on 5/11/2022 at 3:43 PM    Please note that this chart was generated using voice recognition Dragon dictation software. Although every effort was made to ensure the accuracy of this automated transcription, some errors in transcription may have occurred.

## 2022-05-11 NOTE — PATIENT INSTRUCTIONS
- Keep on Plavix 75 mg   - follow up with neurosurgery for cervical stenosis   - Maxalt 10 mg as needed for severe headache   - Magnesium oxide 400 mg daily.    - follow up with us in 6 months

## 2022-05-11 NOTE — PROGRESS NOTES
[address] [place] [address] # [address] [id number]  Dept: [phone]  Dept Fax: [phone]    NEUROLOGY FOLLOW-UP PATIENT NOTE       PATIENT NAME: Nancey Claude  PATIENT MRN: [id number]  PRIMARY CARE PHYSICIAN: RUSSELL Bunch      HPI:      Nancey Claude is a 63 y.o. female presented to the hospital on April 2022 with severe caudocranial headache, 10 out of 10, relieved with using NSAIDs, associated with photophobia and phonophobia, left-sided facial droop, PMH of CVA Plavix with baseline of left-sided weakness, anxiety, migraine that has been controlled with no episodes of migraine for the past few years,    -Patient went for urgent care and was recommended to go to the hospital evaluated in [place] by stroke team and was noted to have left extremity weakness left-sided facial droop,  -CT head and CTA: Negative, transferred to [facility] [place] MRI brain: Negative,  -Patient was given migraine cocktail in addition to Depakote 500 mg IV 1 dose reported improvement of her headache discharged    Since discharge from the hospital she complained of to severe headache, 10 out of 10, top of the headache, accompanied with photophobia, patient took oxycodone that helped however usually she gets mild headache that is getting better with Tylenol    PREVIOUS WORKUP:     Lab Results   Component Value Date    WBC 7.1 03/27/2022    HGB 14.9 03/27/2022    HCT 45.3 03/27/2022    MCV 92.6 03/27/2022     03/27/2022       Past Medical History:   Diagnosis Date    Anxiety     Cerebrovascular disease     Mini stroke in 2006    CVA (cerebral infarction)     Facial weakness     left    Family history of colon cancer     Gastroesophageal reflux disease without esophagitis 5/31/2016    History of TIAs     Hypoglycemia     [address] [address]     Memory problem     Migraine     Sleep difficulties     Speech abnormality     Tobacco abuse     Unspecified sleep apnea         Past Surgical History:   Procedure Laterality Date    APPENDECTOMY      CARDIAC CATHETERIZATION  7-2-15    nml    CHOLECYSTECTOMY      COLONOSCOPY  4/29/2015    2 polyps, sigmoid diverticulosis    FOOT SURGERY Right     HYSTERECTOMY      OVARIAN CYST SURGERY      TONSILLECTOMY          Social History     Socioeconomic History    Marital status:      Spouse name: Not on file    Number of children: Not on file    Years of education: Not on file    Highest education level: Not on file   Occupational History     Employer: NONE   Tobacco Use    Smoking status: Current Every Day Smoker     Packs/day: 0.50     Years: 20.00     Pack years: 10.00    Smokeless tobacco: Never Used   Vaping Use    Vaping Use: Never used   Substance and Sexual Activity    Alcohol use: No     Alcohol/week: 0.0 standard drinks    Drug use: No    Sexual activity: Not on file   Other Topics Concern    Not on file   Social History Narrative    Not on file     Social Determinants of Health     Financial Resource Strain: Low Risk     Difficulty of Paying Living Expenses: Not hard at all   Food Insecurity: No Food Insecurity    Worried About Running Out of Food in the Last Year: Never true    [place] of Food in the Last Year: Never true   Transportation Needs:     Lack of Transportation (Medical): Not on file    Lack of Transportation (Non-Medical):  Not on file   Physical Activity:     Days of Exercise per Week: Not on file    Minutes of Exercise per Session: Not on file   Stress:     Feeling of Stress : Not on file   Social Connections:     Frequency of Communication with Friends and Family: Not on file    Frequency of Social Gatherings with Friends and Family: Not on file    Attends Oriental orthodox Services: Not on file    Active Member of Clubs or Organizations: Not on file    Attends Club or Organization Meetings: Not on file    Marital Status: Not on file   Intimate Partner Violence:     Fear of Current or Ex-Partner: Not on file    Emotionally Abused: Not on file    Physically Abused: Not on file    Sexually Abused: Not on file   Housing Stability:     Unable to Pay for Housing in the Last Year: Not on file    Number of Places Lived in the Last Year: Not on file    Unstable Housing in the Last Year: Not on file        Current Outpatient Medications   Medication Sig Dispense Refill    diphenhydrAMINE (BENADRYL) 25 MG capsule Take 25 mg by mouth every 6 hours as needed for Itching      Probiotic Product (PROBIOTIC-10 PO) Take by mouth      vitamin C (ASCORBIC ACID) 500 MG tablet Take 500 mg by mouth daily      celecoxib (CELEBREX) 200 MG capsule Take 1 capsule by mouth daily 90 capsule 0    acetaminophen (TYLENOL) 500 MG tablet Take 500 mg by mouth every 6 hours as needed for Pain (pain)      clopidogrel (PLAVIX) 75 MG tablet Take 1 tablet by mouth daily 90 tablet 0    RABEprazole (ACIPHEX) 20 MG tablet TAKE 1 TABLET DAILY 90 tablet 1    famotidine (PEPCID) 40 MG tablet TAKE 1 TABLET EVERY EVENING 90 tablet 1    solifenacin (VESICARE) 5 MG tablet TAKE 1 TABLET DAILY 90 tablet 1    Cyanocobalamin (VITAMIN B 12 PO) Take by mouth       Cholecalciferol (VITAMIN D3 PO) Take by mouth daily        No current facility-administered medications for this visit. Allergies   Allergen Reactions    Tape Mariana Drivers Tape] Rash        REVIEW OF SYSTEMS:     Review of Systems     VITALS  /79 (Site: Left Upper Arm, Position: Sitting, Cuff Size: Large Adult)   Pulse 79   Temp 98.1 °F (36.7 °C) (Temporal)   Resp 18   Ht 5' 4\" (1.626 m)   Wt 166 lb (75.3 kg)   SpO2 95%   BMI 28.49 kg/m²      PHYSICAL EXAMINATION:     Physical Exam   General appearance: cooperative  Skin: no rash or skin lesions.   HEENT: normocephalic  Optic Fundi: deferred  Neck: supple, no cervcical adenopathy or carotid bruit  Lungs: clear to auscultation  Heart: Regular rate and rhythm, normal S1, S2. No murmurs, clicks or gallops.   Peripheral pulses: radial pulses palpable  Abdominal: BS present, soft, NT, ND  Extremities: no edema    NEUROLOGICAL EXAMINATION:     GENERAL  Appears comfortable and in no distress   HEENT  NC/ AT   HEART  S1 and S2 heard; palpation of pulses: radial pulse    NECK  Supple and no bruits heard   MENTAL STATUS:  Alert, oriented, intact memory, no confusion, normal speech, normal language, no hallucination or delusion   CRANIAL NERVES: II     -      Visual fields intact to confrontation  III,IV,VI -  PERR, EOMs full, no ptosis  V     -     Normal facial sensation   VII    -     Normal facial symmetry  VIII   -     Intact hearing   IX,X -     Symmetrical palate  XI    -     Symmetrical shoulder shrug  XII   -     Midline tongue, no atrophy    MOTOR FUNCTION: RUE: Significant for good strength of grade 5/5 in proximal and distal muscle groups   LUE: Significant for good strength of grade 4+/5 in proximal and distal muscle groups   RLE: Significant for good strength of grade 4-/5 in proximal and distal muscle groups   LLE: Significant for good strength of grade 5/5 in proximal and distal muscle groups      Normal bulk, normal tone and no involuntary movements, no tremor   SENSORY FUNCTION:  Normal touch, normal pin, normal vibration, normal proprioception   CEREBELLAR FUNCTION:  Intact fine motor control over upper limbs and lower limbs   REFLEX FUNCTION:  Symmetric in upper and lower extremities, no Babinski sign   STATION and GAIT  Normal gait and tandem station, normal tip toes and heel walking       ASSESSMENT:           63 y.o. female who presents with The patient is a 63 y.o. female who has past medical history of CVA on plavix,anxiety, migraine and chronic smoker presents  at [facility] for concern of severe headache   -MRI cervical spine: Showed multilevel degeneration change, moderate spinal canal stenosis at C5-C6,    PLAN:     - Keep on Plavix 75 mg   - follow up with neurosurgery for cervical stenosis   - Maxalt 10 mg as needed for severe headache   - Magnesium oxide 400 mg daily. - follow up with us in 6 months        Ms. Kendra Lynne received counseling on the following healthy behaviors: medical compliance, smoking cessation, blood pressure control, regular follow up with primary doctor.         Electronically signed by Magdy Alfred MD on 5/11/2022 at 1:31 PM

## 2022-05-18 ENCOUNTER — HOSPITAL ENCOUNTER (OUTPATIENT)
Dept: PHYSICAL THERAPY | Age: 62
Setting detail: THERAPIES SERIES
Discharge: HOME | End: 2022-05-18
Payer: COMMERCIAL

## 2022-05-18 PROCEDURE — 97110 THERAPEUTIC EXERCISES: CPT | Performed by: PHYSICAL THERAPIST

## 2022-05-18 PROCEDURE — 97161 PT EVAL LOW COMPLEX 20 MIN: CPT | Performed by: PHYSICAL THERAPIST

## 2022-05-18 ASSESSMENT — PAIN DESCRIPTION - ONSET: ONSET: PROGRESSIVE

## 2022-05-18 ASSESSMENT — PAIN DESCRIPTION - DESCRIPTORS: DESCRIPTORS: ACHING;PINS AND NEEDLES;NUMBNESS

## 2022-05-18 ASSESSMENT — PAIN DESCRIPTION - ORIENTATION: ORIENTATION: LEFT

## 2022-05-18 ASSESSMENT — PAIN DESCRIPTION - LOCATION: LOCATION: NECK

## 2022-05-18 ASSESSMENT — PAIN SCALES - GENERAL: PAINLEVEL_OUTOF10: 3

## 2022-05-18 ASSESSMENT — PAIN DESCRIPTION - FREQUENCY: FREQUENCY: CONTINUOUS

## 2022-05-18 ASSESSMENT — PAIN - FUNCTIONAL ASSESSMENT: PAIN_FUNCTIONAL_ASSESSMENT: PREVENTS OR INTERFERES WITH ALL ACTIVE AND SOME PASSIVE ACTIVITIES

## 2022-05-18 NOTE — FLOWSHEET NOTE
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  · Treatment Diagnosis: M47.812 Cervical spondylosis  Insurance/Certification information:  PT Insurance Information: BCBS  Physician Information:   Houston Blank CNP  Plan of care signed (Y/N): n   Visit# / total visits: 1 /10  Pain level: 7/10       Time In:8:00   Time Out:8:46    Progress Note: [x]  Yes  []  No  Next due by: Visit #10 , or 22     Subjective:See eval       Objective: See eval  Observation:   Test measurements:      Exercises:   Exercise/Equipment Resistance/Repetitions Other comments   C-spine retraction 10x    Retraction/extension 10x    Active rotation 5x    L side bend          B rowing/ extension          L median N flossing           Manual traction/ retraction 3'                        [x] Provided verbal/tactile cueing for activities related to strengthening, flexibility, endurance, ROM. (80192)  [] Provided verbal/tactile cueing for activities related to improving balance, coordination, kinesthetic sense, posture, motor skill, proprioception. (08065)    Therapeutic Activities:     [] Therapeutic activities, direct (one-on-one) patient contact (use of dynamic activities to improve functional performance). (38899)    Gait:   [] Provided training and instruction to the patient for ambulation re-education. (20379)    Self-Care/ADL's  [] Self-care/home management training and compensatory training, meal preparation, safety procedures, and instructions in use of assistive technology devices/adaptive equipment, direct one-on-one contact. (52070)    Home Exercise Program:     [] Reviewed/Progressed HEP activities related to strengthening, flexibility, endurance, ROM. (67356)  [] Reviewed/Progressed HEP activities related to improving balance, coordination, kinesthetic sense, posture, motor skill, proprioception.  (73154)    Manual Treatments:    [] Provided manual therapy to mobilize soft tissue/joints for the purpose of modulating pain, promoting relaxation,  increasing ROM, reducing/eliminating soft tissue swelling/inflammation/restriction, improving soft tissue extensibility. (26620)    Service Based Modalities:  Eval 31'    Timed Code Treatment Minutes:    There ex/ HEP 15'    Total Treatment Minutes: 55'      Treatment/Activity Tolerance:  [x] Patient tolerated treatment well [] Patient limited by fatique  [] Patient limited by pain  [] Patient limited by other medical complications  [x] Other: Caution due to TIA/ HA     Prognosis: [x] Good [] Fair  [] Poor    Patient Requires Follow-up: [x] Yes  [] No      Goals:  Short Term Goals  Time Frame for Short term goals: 1 week  Short term goal 1: Start HEP    Long Term Goals  Time Frame for Long term goals : 4 weeks  Long term goal 1: Neck pain controlled at 2-3/10 to allow regular ADL  Long term goal 2: L UE radiating symptoms reduced 75%  Long term goal 3: C-spine AROM 30 deg extension, rotations 60 deg for safe driving needs  Long term goal 4: Able to continue regular work schedule          Plan:   [] Continue per plan of care [] Alter current plan (see comments)  [x] Plan of care initiated [] Hold pending MD visit [] Discharge  Plan for Next Session:      Electronically signed by:  Aryan Torres PT,PT
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
## 2022-05-18 NOTE — PLAN OF CARE
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 Please review the attached evaluation and/or summary of the patient's plan of care, and verify that you agree therapy should continue by signing the attached document and sending it back to our office.     Plan of Care/Treatment to date:  [x] Therapeutic Exercise    [] Modalities:  [] Therapeutic Activity     [] Ultrasound  [] Electrical Stimulation  [] Gait Training      [x] Cervical Traction [] Lumbar Traction  [] Neuromuscular Re-education    [] Cold/hotpack [] Iontophoresis   [x] Instruction in HEP     Other:  [x] Manual Therapy      []             [] Aquatic Therapy      []                 Goals:  Short Term Goals  Time Frame for Short term goals: 1 week  Short term goal 1: Start HEP    Long Term Goals  Time Frame for Long term goals : 4 weeks  Long term goal 1: Neck pain controlled at 2-3/10 to allow regular ADL  Long term goal 2: L UE radiating symptoms reduced 75%  Long term goal 3: C-spine AROM 30 deg extension, rotations 60 deg for safe driving needs  Long term goal 4: Able to continue regular work schedule    Frequency/Duration:22 - 22  # Days per week: [] 1 day # Weeks: [] 1 week [] 5 weeks     [x] 2 days   [] 2 weeks [] 6 weeks     [] 3 days   [] 3 weeks [] 7 weeks     [] 4 days   [x] 4 weeks [] 8 weeks    Rehab Potential: [] Excellent [x] Good [] Fair  [] Poor     Electronically signed by:  Nolberto Curling, PT      If you have any questions or concerns, please don't hesitate to call.   Thank you for your referral.      Physician Signature:________________________________Date:__________________  By signing above, therapists plan is approved by physician

## 2022-05-24 DIAGNOSIS — N32.81 OVERACTIVE BLADDER: ICD-10-CM

## 2022-05-24 RX ORDER — SOLIFENACIN SUCCINATE 5 MG/1
TABLET, FILM COATED ORAL
Qty: 90 TABLET | Refills: 1 | OUTPATIENT
Start: 2022-05-24

## 2022-05-24 NOTE — TELEPHONE ENCOUNTER
Last Appt:  4/20/2022  Next Appt:   10/21/2022  [facility] [address]

## 2022-05-31 ENCOUNTER — HOSPITAL ENCOUNTER (OUTPATIENT)
Dept: PHYSICAL THERAPY | Age: 62
Setting detail: THERAPIES SERIES
Discharge: HOME | End: 2022-05-31
Payer: COMMERCIAL

## 2022-05-31 PROCEDURE — 97110 THERAPEUTIC EXERCISES: CPT | Performed by: PHYSICAL THERAPIST

## 2022-05-31 NOTE — FLOWSHEET NOTE
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 Still has 3rd and 4th finger finger numbness / tingle that is intermittent   Reports B toe tingle since back to work . Objective:   Observation:   Is now back to work at [employer], full time  Job involves almost constant looking down, flexed position  Test measurements:    + ULTT for Median nerve  + Tinnel's on L Median nerve  C-spine retraction , and retraction/ extension causes central neck pain. ROM does show mechanical improvement with repeted motions. No peripheralisation.   Caution with neck approach due to past CVA/ TIA history    Exercises:   Exercise/Equipment Resistance/Repetitions Other comments   C-spine retraction 10x, x2    Retraction/extension 10x    Active rotation 10x L, 10x R    L side bend 10x         B rowing/ extension          L median N flossing 3'          Manual traction/ retraction 15'         Cervical roll in pillow 2'              [x] Provided verbal/tactile cueing for activities related to strengthening, flexibility, endurance, ROM. (29160)  [] Provided verbal/tactile cueing for activities related to improving balance, coordination, kinesthetic sense, posture, motor skill, proprioception. (89376)    Therapeutic Activities:     [] Therapeutic activities, direct (one-on-one) patient contact (use of dynamic activities to improve functional performance). (18892)    Gait:   [] Provided training and instruction to the patient for ambulation re-education. (82529)    Self-Care/ADL's  [] Self-care/home management training and compensatory training, meal preparation, safety procedures, and instructions in use of assistive technology devices/adaptive equipment, direct one-on-one contact. (48549)    Home Exercise Program:  C-spine retraction, L side bend, rotations & cervical roll for sleeping   [] Reviewed/Progressed HEP activities related to strengthening, flexibility, endurance, ROM. (35499)  [] Reviewed/Progressed HEP activities related to improving balance, coordination, kinesthetic sense, posture, motor skill, proprioception.  (02483)    Manual Treatments:    [] Provided manual therapy to mobilize soft tissue/joints for the purpose of modulating pain, promoting relaxation,  increasing ROM, reducing/eliminating soft tissue swelling/inflammation/restriction, improving soft tissue extensibility. (52750)    Service Based Modalities:      Timed Code Treatment Minutes:    There ex/ HEP 39'    Total Treatment Minutes: 44'      Treatment/Activity Tolerance:  [x] Patient tolerated treatment well [] Patient limited by fatique  [] Patient limited by pain  [] Patient limited by other medical complications  [x] Other: Caution due to TIA/ HA     Prognosis: [x] Good [] Fair  [] Poor    Patient Requires Follow-up: [x] Yes  [] No      Goals:  Short Term Goals  Time Frame for Short term goals: 1 week  Short term goal 1: Start HEP    Long Term Goals  Time Frame for Long term goals : 4 weeks  Long term goal 1: Neck pain controlled at 2-3/10 to allow regular ADL  Long term goal 2: L UE radiating symptoms reduced 75%  Long term goal 3: C-spine AROM 30 deg extension, rotations 60 deg for safe driving needs  Long term goal 4: Able to continue regular work schedule          Plan:   [x] Continue per plan of care [] Aby Tai current plan (see comments)  [] Plan of care initiated [] Hold pending MD visit [] Discharge  Plan for Next Session:      Electronically signed by:  Michaela Garcia PT,PT

## 2022-06-02 ENCOUNTER — HOSPITAL ENCOUNTER (OUTPATIENT)
Dept: PHYSICAL THERAPY | Age: 62
Setting detail: THERAPIES SERIES
Discharge: HOME | End: 2022-06-02
Payer: COMMERCIAL

## 2022-06-02 PROCEDURE — 97110 THERAPEUTIC EXERCISES: CPT | Performed by: PHYSICAL THERAPIST
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
## 2022-06-06 DIAGNOSIS — N32.81 OVERACTIVE BLADDER: ICD-10-CM

## 2022-06-06 RX ORDER — SOLIFENACIN SUCCINATE 5 MG/1
TABLET, FILM COATED ORAL
Qty: 90 TABLET | Refills: 1 | Status: SHIPPED | OUTPATIENT
Start: 2022-06-06 | End: 2022-11-01

## 2022-06-07 ENCOUNTER — HOSPITAL ENCOUNTER (OUTPATIENT)
Dept: PHYSICAL THERAPY | Age: 62
Setting detail: THERAPIES SERIES
Discharge: HOME | End: 2022-06-07
Payer: COMMERCIAL

## 2022-06-07 PROCEDURE — 97110 THERAPEUTIC EXERCISES: CPT | Performed by: PHYSICAL THERAPIST

## 2022-06-07 NOTE — FLOWSHEET NOTE
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 Came in after work . Neck and arm are better than a couple weeks ago. Objective:   Observation:   Is now back to work at [employer], full time  Job involves almost constant looking down, flexed position  Is using strategy at work to break up duration of flexed positioning  Test measurements:    + ULTT for Median nerve, reproduces finger tingle  + Tinnel's on L Median nerve  C-spine retraction , and retraction/ extension causes central neck pain. ROM does show mechanical improvement with repeated motions. No peripheralisation. L rotation 60 deg, R rotation 65-70 deg. Extension in sitting 20 deg, local neck pain & stiffness. Manual assisted extension in supine to 30 deg, central pain  Relief with manual traction.   Caution with neck approach due to past CVA/ TIA history    Exercises:   Exercise/Equipment Resistance/Repetitions Other comments   C-spine retraction 10x, x2    Retraction/extension 10x    Active rotation 10x L, 10x R    L side bend 10x         B rowing/ extension          L median N flossing 3'          Manual traction/ retraction 20'         Cervical roll in pillow 2'              [x] Provided verbal/tactile cueing for activities related to strengthening, flexibility, endurance, ROM. (54860)  [] Provided verbal/tactile cueing for activities related to improving balance, coordination, kinesthetic sense, posture, motor skill, proprioception. (94096)    Therapeutic Activities:     [] Therapeutic activities, direct (one-on-one) patient contact (use of dynamic activities to improve functional performance). (59394)    Gait:   [] Provided training and instruction to the patient for ambulation re-education. (76053)    Self-Care/ADL's  [] Self-care/home management training and compensatory training, meal preparation, safety procedures, and instructions in use of assistive technology devices/adaptive equipment, direct one-on-one contact. (20922)    Home Exercise Program:  C-spine retraction, L side bend, rotations & cervical roll for sleeping   [] Reviewed/Progressed HEP activities related to strengthening, flexibility, endurance, ROM. (80262)  [] Reviewed/Progressed HEP activities related to improving balance, coordination, kinesthetic sense, posture, motor skill, proprioception.  (85310)    Manual Treatments:    [] Provided manual therapy to mobilize soft tissue/joints for the purpose of modulating pain, promoting relaxation,  increasing ROM, reducing/eliminating soft tissue swelling/inflammation/restriction, improving soft tissue extensibility. (65385)    Service Based Modalities:      Timed Code Treatment Minutes:    There ex 38'    Total Treatment Minutes: 45'      Treatment/Activity Tolerance:  [x] Patient tolerated treatment well [] Patient limited by fatique  [] Patient limited by pain  [] Patient limited by other medical complications  [x] Other: Caution due to TIA/ HA     Prognosis: [x] Good [] Fair  [] Poor    Patient Requires Follow-up: [x] Yes  [] No      Goals:  Short Term Goals  Time Frame for Short term goals: 1 week  Short term goal 1: Start HEP    Long Term Goals  Time Frame for Long term goals : 4 weeks  Long term goal 1: Neck pain controlled at 2-3/10 to allow regular ADL  Long term goal 2: L UE radiating symptoms reduced 75%  Long term goal 3: C-spine AROM 30 deg extension, rotations 60 deg for safe driving needs  Long term goal 4: Able to continue regular work schedule          Plan:   [x] Continue per plan of care [] Alter current plan (see comments)  [] Plan of care initiated [] Hold pending MD visit [] Discharge  Plan for Next Session:Traction      Electronically signed by:  Mabel Ledesma, PT,PT

## 2022-06-09 ENCOUNTER — HOSPITAL ENCOUNTER (OUTPATIENT)
Dept: PHYSICAL THERAPY | Age: 62
Setting detail: THERAPIES SERIES
Discharge: HOME | End: 2022-06-09
Payer: COMMERCIAL

## 2022-06-09 PROCEDURE — 97012 MECHANICAL TRACTION THERAPY: CPT

## 2022-06-09 PROCEDURE — 97140 MANUAL THERAPY 1/> REGIONS: CPT

## 2022-06-09 PROCEDURE — 97110 THERAPEUTIC EXERCISES: CPT

## 2022-06-09 NOTE — FLOWSHEET NOTE
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 Patient noting tightness of the cervical spine. Patient noting had a different job last night and feels more stiffness. Objective: Stiffness with ext and cervical retraction this date. Observation:   Is now back to work at [employer], full time  Job involves almost constant looking down, flexed position  Is using strategy at work to break up duration of flexed positioning  Test measurements:    + ULTT for Median nerve, reproduces finger tingle  + Tinnel's on L Median nerve  C-spine retraction , and retraction/ extension causes central neck pain. ROM does show mechanical improvement with repeated motions. No peripheralisation. L rotation 60 deg, R rotation 65-70 deg. Extension in sitting 20 deg, local neck pain & stiffness. Manual assisted extension in supine to 30 deg, central pain  Relief with manual traction.   Caution with neck approach due to past CVA/ TIA history    Exercises:   Exercise/Equipment Resistance/Repetitions Other comments   C-spine retraction 10x, x2    Retraction/extension 10x    Active rotation 10x L, 10x R    L side bend 10x         B rowing/ extension          L median N flossing 3'          Manual traction/ retraction 15'         Cervical roll in pillow               [x] Provided verbal/tactile cueing for activities related to strengthening, flexibility, endurance, ROM. (42825)  [] Provided verbal/tactile cueing for activities related to improving balance, coordination, kinesthetic sense, posture, motor skill, proprioception. (47291)    Therapeutic Activities:     [] Therapeutic activities, direct (one-on-one) patient contact (use of dynamic activities to improve functional performance). (27013)    Gait:   [] Provided training and instruction to the patient for ambulation re-education. (41835)    Self-Care/ADL's  [] Self-care/home management training and compensatory training, meal preparation, safety procedures, and instructions in use of assistive technology devices/adaptive equipment, direct one-on-one contact. (85500)    Home Exercise Program:  C-spine retraction, L side bend, rotations & cervical roll for sleeping   [] Reviewed/Progressed HEP activities related to strengthening, flexibility, endurance, ROM. (00164)  [] Reviewed/Progressed HEP activities related to improving balance, coordination, kinesthetic sense, posture, motor skill, proprioception.  (48610)    Manual Treatments:    [] Provided manual therapy to mobilize soft tissue/joints for the purpose of modulating pain, promoting relaxation,  increasing ROM, reducing/eliminating soft tissue swelling/inflammation/restriction, improving soft tissue extensibility. (60908)    Service Based Modalities:  15' Cervical Traction 17# max 8#min intermittent to decrease pain and radicular symptoms. Timed Code Treatment Minutes:   Man 13' , ex 17'    Total Treatment Minutes: 52'      Treatment/Activity Tolerance:  [x] Patient tolerated treatment well [] Patient limited by fatique  [] Patient limited by pain  [] Patient limited by other medical complications  [x] Other: Caution due to TIA/ HA     Prognosis: [x] Good [] Fair  [] Poor    Patient Requires Follow-up: [x] Yes  [] No      Goals:  Short Term Goals  Time Frame for Short term goals: 1 week  Short term goal 1: Start HEP    Long Term Goals  Time Frame for Long term goals : 4 weeks  Long term goal 1: Neck pain controlled at 2-3/10 to allow regular ADL  Long term goal 2: L UE radiating symptoms reduced 75%  Long term goal 3: C-spine AROM 30 deg extension, rotations 60 deg for safe driving needs  Long term goal 4: Able to continue regular work schedule          Plan:   [x] Continue per plan of care [] Alter current plan (see comments)  [] Plan of care initiated [] Hold pending MD visit [] Discharge  Plan for Next Session: Progress PT     Electronically signed by:  Antonio Nuno PT

## 2022-06-10 DIAGNOSIS — I63.9 CEREBROVASCULAR ACCIDENT (CVA), UNSPECIFIED MECHANISM (HCC): ICD-10-CM

## 2022-06-10 RX ORDER — CLOPIDOGREL BISULFATE 75 MG/1
TABLET ORAL
Qty: 90 TABLET | Refills: 1 | Status: SHIPPED | OUTPATIENT
Start: 2022-06-10

## 2022-06-10 NOTE — TELEPHONE ENCOUNTER
Last Appt:  4/20/2022  Next Appt:   10/21/2022  Med verified in [facility] [address]

## 2022-06-14 ENCOUNTER — HOSPITAL ENCOUNTER (OUTPATIENT)
Dept: PHYSICAL THERAPY | Age: 62
Setting detail: THERAPIES SERIES
Discharge: HOME | End: 2022-06-14
Payer: COMMERCIAL

## 2022-06-14 PROCEDURE — 97140 MANUAL THERAPY 1/> REGIONS: CPT | Performed by: PHYSICAL THERAPIST

## 2022-06-14 PROCEDURE — 97110 THERAPEUTIC EXERCISES: CPT | Performed by: PHYSICAL THERAPIST

## 2022-06-14 NOTE — FLOWSHEET NOTE
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 LBP also. Felt ok during and after traction at last visit. Pain did increase then on 6/10/22    Objective:    Observation:   Job involves almost constant looking down, flexed position  Is using strategy at work to break up duration of flexed positioning  Test measurements:    + ULTT for Median nerve, reproduces finger tingle  + Tinnel's on L Median nerve  C-spine retraction , and retraction/ extension causes central neck pain. ROM does show mechanical improvement with repeated motions. No peripheralisation. L rotation 60 deg, R rotation 65-70 deg. Extension in sitting 20 deg, local neck pain & stiffness. Manual assisted extension in supine to 30 deg, central pain  Relief with manual traction.   No headache or dizziness symptoms during or after manual guided treatment    LBP with sitting, position change and cough  Caution with neck approach due to past CVA/ TIA history    Exercises:   Exercise/Equipment Resistance/Repetitions Other comments   C-spine retraction 10x, x2    Retraction/extension 10x    Active rotation 10x L, 10x R    L side bend 10x         B rowing/ extension          L median N flossing 3'          Manual traction/ retraction 15'         Cervical roll in pillow Modified extension 10x    Back Bend 5x    [x] Provided verbal/tactile cueing for activities related to strengthening, flexibility, endurance, ROM. (80174)  [] Provided verbal/tactile cueing for activities related to improving balance, coordination, kinesthetic sense, posture, motor skill, proprioception. (48899)    Therapeutic Activities:     [] Therapeutic activities, direct (one-on-one) patient contact (use of dynamic activities to improve functional performance). (87491)    Gait:   [] Provided training and instruction to the patient for ambulation re-education. (28941)    Self-Care/ADL's  [] Self-care/home management training and compensatory training, meal preparation, safety procedures, and instructions in use of assistive technology devices/adaptive equipment, direct one-on-one contact. (88231)    Home Exercise Program:  C-spine retraction, L side bend, rotations & cervical roll for sleeping   [] Reviewed/Progressed HEP activities related to strengthening, flexibility, endurance, ROM. (55957)  [] Reviewed/Progressed HEP activities related to improving balance, coordination, kinesthetic sense, posture, motor skill, proprioception.  (07708)    Manual Treatments:    [] Provided manual therapy to mobilize soft tissue/joints for the purpose of modulating pain, promoting relaxation,  increasing ROM, reducing/eliminating soft tissue swelling/inflammation/restriction, improving soft tissue extensibility.  (99736)    Service Based Modalities:    Timed Code Treatment Minutes: Manual there 30'                                                             There ex 10'    Total Treatment Minutes: 36'      Treatment/Activity Tolerance:  [x] Patient tolerated treatment well [] Patient limited by fatique  [] Patient limited by pain  [] Patient limited by other medical complications  [x] Other: Caution due to TIA/ HA     Prognosis: [x] Good [] Fair  [] Poor    Patient Requires Follow-up: [x] Yes  [] No      Goals:  Short Term Goals  Time Frame for Short term goals: 1 week  Short term goal 1: Start HEP    Long Term Goals  Time Frame for Long term goals : 4 weeks  Long term goal 1: Neck pain controlled at 2-3/10 to allow regular ADL  Long term goal 2: L UE radiating symptoms reduced 75%  Long term goal 3: C-spine AROM 30 deg extension, rotations 60 deg for safe driving needs  Long term goal 4: Able to continue regular work schedule          Plan:   [x] Continue per plan of care [] Alter current plan (see comments)  [] Plan of care initiated [] Hold pending MD visit [] Discharge  Plan for Next Session:      Electronically signed by:  Nolberto Curling, PT

## 2022-06-16 ENCOUNTER — HOSPITAL ENCOUNTER (OUTPATIENT)
Dept: PHYSICAL THERAPY | Age: 62
Setting detail: THERAPIES SERIES
Discharge: HOME | End: 2022-06-16
Payer: COMMERCIAL

## 2022-06-16 NOTE — PROGRESS NOTES
Physical Therapy  Outpatient Physical Therapy    [] [place]  Phone: [phone]  Fax: [phone]      [] [place]  Phone: [phone]  Fax: [phone]    Physical Therapy  Cancellation/No-show Note  Patient Name:  Fely Alfaro  :  1960   Date:  2022  Cancelled visits to date: 1  No-shows to date: 0    For today's appointment patient:  [x]  Cancelled  []  Rescheduled appointment  []  No-show     Reason given by patient:  [x]  Patient ill  []  Conflicting appointment  []  No transportation    []  Conflict with work  []  No reason given  []  Other:     Comments:      Electronically signed by: Alistair Yost PT

## 2022-06-20 DIAGNOSIS — Z12.31 VISIT FOR SCREENING MAMMOGRAM: Primary | ICD-10-CM

## 2022-06-21 ENCOUNTER — HOSPITAL ENCOUNTER (OUTPATIENT)
Dept: PHYSICAL THERAPY | Age: 62
Setting detail: THERAPIES SERIES
Discharge: HOME | End: 2022-06-21
Payer: COMMERCIAL

## 2022-06-21 PROCEDURE — 97140 MANUAL THERAPY 1/> REGIONS: CPT | Performed by: PHYSICAL THERAPIST

## 2022-06-21 NOTE — FLOWSHEET NOTE
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 Was off work on a scheduled shut down last week. Now has times that L UE symptoms are gone  Objective:    Observation:  Came in after work shift   Test measurements:    + ULTT for Median nerve, reproduces finger tingle  + Tinnel's on L Median nerve. Has some carpal tunnel pattern pain   C-spine retraction , and retraction/ extension causes central neck pain. ROM does show mechanical improvement with repeated motions. No peripheralisation. L rotation 65 deg, R rotation 70 deg. Extension in sitting 30 deg, local neck pain & stiffness. Manual assisted extension in supine to 30 deg, central pain  Relief with manual traction. Overall all pain symptoms are reduced, and mechanical improvements of ROM     Caution with neck approach due to past CVA/ TIA history    Exercises:   Exercise/Equipment Resistance/Repetitions Other comments   C-spine retraction 10x, x2    Retraction/extension 10x    Active rotation 10x L, 10x R    L side bend 10x         B rowing/ extension          L median N flossing 3'          Manual traction/ retraction 15'         Cervical roll in pillow     Modified extension 10x    Back Bend 5x    [x] Provided verbal/tactile cueing for activities related to strengthening, flexibility, endurance, ROM. (79689)  [] Provided verbal/tactile cueing for activities related to improving balance, coordination, kinesthetic sense, posture, motor skill, proprioception. (97130)    Therapeutic Activities:     [] Therapeutic activities, direct (one-on-one) patient contact (use of dynamic activities to improve functional performance). (76464)    Gait:   [] Provided training and instruction to the patient for ambulation re-education. (93162)    Self-Care/ADL's  [] Self-care/home management training and compensatory training, meal preparation, safety procedures, and instructions in use of assistive technology devices/adaptive equipment, direct one-on-one contact. (91522)    Home Exercise Program:  C-spine retraction, L side bend, rotations & cervical roll for sleeping   [] Reviewed/Progressed HEP activities related to strengthening, flexibility, endurance, ROM. (38201)  [] Reviewed/Progressed HEP activities related to improving balance, coordination, kinesthetic sense, posture, motor skill, proprioception.  (05130)    Manual Treatments:    [] Provided manual therapy to mobilize soft tissue/joints for the purpose of modulating pain, promoting relaxation,  increasing ROM, reducing/eliminating soft tissue swelling/inflammation/restriction, improving soft tissue extensibility.  (24733)    Service Based Modalities:    Timed Code Treatment Minutes: Manual there 30'                                                                 Total Treatment Minutes: 30'      Treatment/Activity Tolerance:  [x] Patient tolerated treatment well [] Patient limited by fatique  [] Patient limited by pain  [] Patient limited by other medical complications  [x] Other: Caution due to TIA/ HA     Prognosis: [x] Good [] Fair  [] Poor    Patient Requires Follow-up: [x] Yes  [] No      Goals:  Short Term Goals  Time Frame for Short term goals: 1 week  Short term goal 1: Start HEP    Long Term Goals  Time Frame for Long term goals : 4 weeks  Long term goal 1: Neck pain controlled at 2-3/10 to allow regular ADL  Long term goal 2: L UE radiating symptoms reduced 75%  Long term goal 3: C-spine AROM 30 deg extension, rotations 60 deg for safe driving needs  Long term goal 4: Able to continue regular work schedule          Plan:   [x] Continue per plan of care [] Alter current plan (see comments)  [] Plan of care initiated [] Hold pending MD visit [] Discharge  Plan for Next Session:      Electronically signed by:  Smith Lomeli PT

## 2022-06-23 ENCOUNTER — APPOINTMENT (OUTPATIENT)
Dept: PHYSICAL THERAPY | Age: 62
End: 2022-06-23
Payer: COMMERCIAL

## 2022-06-28 ENCOUNTER — HOSPITAL ENCOUNTER (OUTPATIENT)
Dept: PHYSICAL THERAPY | Age: 62
Setting detail: THERAPIES SERIES
Discharge: HOME | End: 2022-06-28
Payer: COMMERCIAL

## 2022-06-28 PROCEDURE — 97110 THERAPEUTIC EXERCISES: CPT | Performed by: PHYSICAL THERAPIST

## 2022-06-28 NOTE — FLOWSHEET NOTE
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 Minimal neck pain. Any symptoms are now from the wrist down. Carpal tunnel problem. Had EMG/ Nerve conduction tests a couple yrs ago , that did Dx CTS  Objective:    Observation:    Test measurements:    + ULTT for Median nerve, reproduces finger tingle  + Tinnel's on L Median nerve. Has some carpal tunnel pattern pain     ROM does show mechanical improvement with repeated motions. No peripheralisation. L rotation 65 deg, R rotation 70 deg. Extension in sitting 35 deg, tight, not painful  Relief with manual traction.   Overall all pain symptoms are reduced, and mechanical improvements of ROM     Caution with neck approach due to past CVA/ TIA history    Exercises:   Exercise/Equipment Resistance/Repetitions Other comments   C-spine retraction 10x, x2    Retraction/extension 10x    Active rotation 10x L, 10x R    L side bend 10x         B rowing/ extension          L median N flossing 3'          Manual traction/ retraction 15'         Cervical roll in pillow     Modified extension 10x    Back Bend 5x    [x] Provided verbal/tactile cueing for activities related to strengthening, flexibility, endurance, ROM. (59801)  [] Provided verbal/tactile cueing for activities related to improving balance, coordination, kinesthetic sense, posture, motor skill, proprioception. (38070)    Therapeutic Activities:     [] Therapeutic activities, direct (one-on-one) patient contact (use of dynamic activities to improve functional performance). (86432)    Gait:   [] Provided training and instruction to the patient for ambulation re-education. (71880)    Self-Care/ADL's  [] Self-care/home management training and compensatory training, meal preparation, safety procedures, and instructions in use of assistive technology devices/adaptive equipment, direct one-on-one contact. (41807)    Home Exercise Program:  C-spine retraction, L side bend, rotations & cervical roll for sleeping   [] Reviewed/Progressed HEP activities related to strengthening, flexibility, endurance, ROM. (00909)  [] Reviewed/Progressed HEP activities related to improving balance, coordination, kinesthetic sense, posture, motor skill, proprioception.  (77120)    Manual Treatments:    [] Provided manual therapy to mobilize soft tissue/joints for the purpose of modulating pain, promoting relaxation,  increasing ROM, reducing/eliminating soft tissue swelling/inflammation/restriction, improving soft tissue extensibility.  (11918)    Service Based Modalities:    Timed Code Treatment Minutes: Manual there 28'                                                                 Total Treatment Minutes: 28'      Treatment/Activity Tolerance:  [x] Patient tolerated treatment well [] Patient limited by fatique  [] Patient limited by pain  [] Patient limited by other medical complications  [x] Other: Caution due to TIA/ HA     Prognosis: [x] Good [] Fair  [] Poor    Patient Requires Follow-up: [x] Yes  [] No      Goals:  Short Term Goals  Time Frame for Short term goals: 1 week  Short term goal 1: Start HEP- met    Long Term Goals  Time Frame for Long term goals : 4 weeks  Long term goal 1: Neck pain controlled at 2-3/10 to allow regular ADL- met  Long term goal 2: L UE radiating symptoms reduced 75% -met  Long term goal 3: C-spine AROM 30 deg extension, rotations 60 deg for safe driving needs-met  Long term goal 4: Able to continue regular work schedule- met          Plan:   [] Continue per plan of care [] Alter current plan (see comments)  [] Plan of care initiated [] Hold pending MD visit [x] Discharge  Plan for Next Session:      Electronically signed by:  Riana Reid PT

## 2022-06-28 NOTE — DISCHARGE SUMMARY
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  · Treatment Diagnosis: M47.812 Cervical spondylosis  ·   ·    Insurance/Certification information:    [facility]  Physician Information:    Terrace Jobs CNP  Plan of care signed (Y/N): y  Visit# / total visits:  7  Pain level: 2/10       Plan of Care/Treatment to date:  [x] Therapeutic Exercise    [] Modalities:  [] Therapeutic Activity     [] Ultrasound  [] Electrical Stimulation  [] Gait Training      [] Cervical Traction    [] Lumbar Traction  [] Neuromuscular Re-education  [] Cold/hotpack [] Iontophoresis  [x] Instruction in HEP      Other:  [x] Manual Therapy       []    [] Aquatic Therapy       []                              Subjective:    L arm is good. Minimal neck pain. Any symptoms are now from the wrist down. Carpal tunnel problem. Had EMG/ Nerve conduction tests a couple yrs ago , that did Dx CTS         Objective:    Test measurements:    + ULTT for Median nerve, reproduces finger tingle  + Tinnel's on L Median nerve. Has some carpal tunnel pattern pain      ROM does show mechanical improvement with repeated motions. No peripheralisation. L rotation 65 deg, R rotation 70 deg. Extension in sitting 35 deg, tight, not painful  Relief with manual traction.             Plan:    d/c       Goals:    Short Term Goals  Time Frame for Short term goals: 1 week  Short term goal 1: Start HEP- met     Long Term Goals  Time Frame for Long term goals : 4 weeks  Long term goal 1: Neck pain controlled at 2-3/10 to allow regular ADL- met  Long term goal 2: L UE radiating symptoms reduced 75% -met  Long term goal 3: C-spine AROM 30 deg extension, rotations 60 deg for safe driving needs-met  Long term goal 4: Able to continue regular work schedule         Percentage of Goals Met: 100            Discharge Prognosis: [] Excellent [x] Good [] Fair  [] Poor     Goal Status:  [x] Achieved [] Partially Achieved  [] Not Achieved       Electronically signed by:  Deborra Medicine, PT

## 2022-06-30 ENCOUNTER — APPOINTMENT (OUTPATIENT)
Dept: PHYSICAL THERAPY | Age: 62
End: 2022-06-30
Payer: COMMERCIAL

## 2022-07-05 DIAGNOSIS — M19.072 ARTHRITIS OF BOTH FEET: ICD-10-CM

## 2022-07-05 DIAGNOSIS — K21.9 GASTROESOPHAGEAL REFLUX DISEASE, UNSPECIFIED WHETHER ESOPHAGITIS PRESENT: ICD-10-CM

## 2022-07-05 DIAGNOSIS — K21.9 LPRD (LARYNGOPHARYNGEAL REFLUX DISEASE): ICD-10-CM

## 2022-07-05 DIAGNOSIS — M19.071 ARTHRITIS OF BOTH FEET: ICD-10-CM

## 2022-07-05 RX ORDER — RABEPRAZOLE SODIUM 20 MG/1
TABLET, DELAYED RELEASE ORAL
Qty: 90 TABLET | Refills: 1 | Status: SHIPPED | OUTPATIENT
Start: 2022-07-05

## 2022-07-05 RX ORDER — CELECOXIB 200 MG/1
200 CAPSULE ORAL DAILY
Qty: 90 CAPSULE | Refills: 1 | Status: SHIPPED | OUTPATIENT
Start: 2022-07-05

## 2022-07-05 NOTE — TELEPHONE ENCOUNTER
Last Appt:  4/20/2022  Next Appt:   10/21/2022  Med verified in [facility] [address]

## 2022-07-22 ENCOUNTER — OFFICE VISIT (OUTPATIENT)
Dept: NEUROSURGERY | Age: 62
End: 2022-07-22
Payer: COMMERCIAL

## 2022-07-22 VITALS
HEIGHT: 64 IN | DIASTOLIC BLOOD PRESSURE: 82 MMHG | BODY MASS INDEX: 28 KG/M2 | SYSTOLIC BLOOD PRESSURE: 124 MMHG | HEART RATE: 96 BPM | WEIGHT: 164 LBS

## 2022-07-22 DIAGNOSIS — G56.02 LEFT CARPAL TUNNEL SYNDROME: Primary | ICD-10-CM

## 2022-07-22 DIAGNOSIS — Z86.73 HISTORY OF CVA (CEREBROVASCULAR ACCIDENT): ICD-10-CM

## 2022-07-22 DIAGNOSIS — M47.812 CERVICAL SPONDYLOSIS: ICD-10-CM

## 2022-07-22 PROCEDURE — 99214 OFFICE O/P EST MOD 30 MIN: CPT | Performed by: NURSE PRACTITIONER

## 2022-07-22 RX ORDER — PREDNISONE 10 MG/1
TABLET ORAL
COMMUNITY
Start: 2022-07-21 | End: 2022-10-21

## 2022-07-22 RX ORDER — GABAPENTIN 300 MG/1
CAPSULE ORAL
COMMUNITY
Start: 2022-07-21

## 2022-07-22 NOTE — PROGRESS NOTES
[address] [facility]  [address] [facility]  [address] [facility], [address]  [place] [address]  Dept: [phone]    Patient:  Kayy Jay  YOB: 1960  Date: 5/11/22    The patient is a 63 y.o. female who presents today for consult of the following problems:     Chief Complaint   Patient presents with    Back Pain    Carpal Tunnel         HPI:     Kayy Jay is a 63 y.o. female who presents for follow-up of neck pain, headaches and left upper extremity weakness, numbness and tingling. Patient does have history significant for stroke, does have some residual left-sided weakness. Has however started having fair amount of nocturnal painful numbness and tingling in median distribution of left hand. It will frequently wake her up. Has a difficult time holding onto things with her left arm, does have hand weakness, did drop a bowl of watermelon the other day. Has been utilizing nocturnal carpal tunnel brace with some improvement to numbness and tingling. Does still have a fair amount of weakness that persists. Neck pain is intermittent, worsens with certain movements particularly with extension. Has been undergoing physical therapy.     History:     Past Medical History:   Diagnosis Date    Anxiety     Cerebrovascular disease     Mini stroke in 2006    CVA (cerebral infarction)     Facial weakness     left    Family history of colon cancer     Gastroesophageal reflux disease without esophagitis 5/31/2016    History of TIAs     Hypoglycemia     Left hemiparesis ([facility] [address]     Memory problem     Migraine     Sleep difficulties     Speech abnormality     Tobacco abuse     Unspecified sleep apnea      Past Surgical History:   Procedure Laterality Date    APPENDECTOMY      CARDIAC CATHETERIZATION  7-2-15    nml    CHOLECYSTECTOMY      COLONOSCOPY  4/29/2015    2 polyps, sigmoid diverticulosis    FOOT SURGERY Right     HYSTERECTOMY (CERVIX STATUS UNKNOWN)      OVARIAN CYST SURGERY      TONSILLECTOMY       Family History   Problem Relation Age of Onset    Diabetes Mother     Heart Disease Father         afib    High Blood Pressure Father     Diabetes Sister     Thyroid Disease Sister     Mental Retardation Sister     Cancer Brother         colon, prostate, lung    Diabetes Brother     Diabetes Brother     Other Brother         hyperglycemia    Heart Disease Brother         atrial fibrillation    Diabetes Maternal Grandmother     Cancer Maternal Grandmother         breast    Cancer Maternal Grandfather         brain    Heart Disease Paternal Grandfather     Other Grandson         2nd Grandson with Hunters Syndrome    Other Grandson         Jack Syndrome/Jack Syndrome     Current Outpatient Medications on File Prior to Visit   Medication Sig Dispense Refill    gabapentin (NEURONTIN) 300 MG capsule       predniSONE (DELTASONE) 10 MG tablet       celecoxib (CELEBREX) 200 MG capsule Take 1 capsule by mouth daily 90 capsule 1    RABEprazole (ACIPHEX) 20 MG tablet TAKE 1 TABLET DAILY 90 tablet 1    clopidogrel (PLAVIX) 75 MG tablet TAKE 1 TABLET DAILY 90 tablet 1    solifenacin (VESICARE) 5 MG tablet TAKE 1 TABLET DAILY 90 tablet 1    magnesium oxide (MAG-OX) 400 (240 Mg) MG tablet Take 1 tablet by mouth daily 30 tablet 3    diphenhydrAMINE (BENADRYL) 25 MG capsule Take 25 mg by mouth every 6 hours as needed for Itching      Probiotic Product (PROBIOTIC-10 PO) Take by mouth      vitamin C (ASCORBIC ACID) 500 MG tablet Take 500 mg by mouth daily      acetaminophen (TYLENOL) 500 MG tablet Take 500 mg by mouth every 6 hours as needed for Pain (pain)      famotidine (PEPCID) 40 MG tablet TAKE 1 TABLET EVERY EVENING 90 tablet 1    Cyanocobalamin (VITAMIN B 12 PO) Take by mouth       Cholecalciferol (VITAMIN D3 PO) Take by mouth daily       rizatriptan (MAXALT) 10 MG tablet Take 1 tablet by mouth once as needed for Migraine May repeat in 2 hours if needed 10 tablet 0     No current facility-administered medications on file prior to visit. Social History     Tobacco Use    Smoking status: Every Day     Packs/day: 0.50     Years: 20.00     Pack years: 10.00     Types: Cigarettes    Smokeless tobacco: Never   Vaping Use    Vaping Use: Never used   Substance Use Topics    Alcohol use: No     Alcohol/week: 0.0 standard drinks    Drug use: No       Allergies   Allergen Reactions    Tape [Adhesive Tape] Rash       Review of Systems  Constitutional: Negative for activity change and appetite change. HENT: Negative for ear pain and facial swelling. Eyes: Negative for discharge and itching. Respiratory: Negative for choking and chest tightness. Cardiovascular: Negative for chest pain and leg swelling. Gastrointestinal: Negative for nausea and abdominal pain. Endocrine: Negative for cold intolerance and heat intolerance. Genitourinary: Negative for frequency and flank pain. Musculoskeletal: Negative for myalgias and joint swelling. Skin: Negative for rash and wound. Allergic/Immunologic: Negative for environmental allergies and food allergies. Hematological: Negative for adenopathy. Does not bruise/bleed easily. Psychiatric/Behavioral: Negative for self-injury. The patient is not nervous/anxious. Physical Exam:      /82 (Site: Left Upper Arm, Position: Sitting)   Pulse 96   Ht 5' 4\" (1.626 m)   Wt 164 lb (74.4 kg)   BMI 28.15 kg/m²   Estimated body mass index is 28.15 kg/m² as calculated from the following:    Height as of this encounter: 5' 4\" (1.626 m). Weight as of this encounter: 164 lb (74.4 kg). General:  Naomi Zhou is a 63y.o. year old female who appears her stated age. HEENT: Normocephalic atraumatic. Neck supple. Chest: regular rate; pulses equal  Abdomen: Soft nontender nondistended.    Ext: DP and PT pulses 2+, good cap refill  Neuro    Mentation  Appropriate affect  Registration intact  Orientation intact  Judgment intact to situation    Cranial Nerves:   Pupils equal and reactive to light  Extraocular motion intact  Face and shrug symmetric  Tongue midline  No dysarthria  v1-3 sensation symmetric, masseter tone symmetric  Hearing symmetric and intact    Sensation: Decreased to left middle and ring finger  Ring finger split: Negative    Motor  Right upper and lower extremity 5/5  Left upper and lower extremity 4+/5    Reflexes  L Brachioradialis 2+/4; R brachioradialis 2+/4  L Biceps 2+/4; R Biceps 2+/4  L Triceps 2+/4; R Triceps 2+/4  L Patellar 2+/4: R Patellar 2+/4  L Achilles 2+/4; R Achilles 2+/4    hoffmans L: neg  hoffmans R: neg  Clonus L: neg  Clonus R: neg  Babinski L: neg  Babinski R: neg    Spurlings: No  Lhermittes: No    Tinels at elbow: Yes-left side  Tinels at wrist: Yes-left side  Phalens: Yes-left side  Ok sign: No  Froments: No  Benedictine Hand: No    Atrophy of thenar: No  Atrophy of hypothenar: No    Studies Review:     MRI cervical spine 4/8/2022 (images reviewed): FINDINGS:   BONES/ALIGNMENT: There is normal alignment of the spine. The vertebral body   heights are maintained. The bone marrow signal appears unremarkable. SPINAL CORD: No abnormal cord signal is seen. SOFT TISSUES: No paraspinal mass identified. C2-C3: There is no significant disc protrusion, spinal canal stenosis or   neural foraminal narrowing. C3-C4: There is no significant disc protrusion, spinal canal stenosis or   neural foraminal narrowing. C4-C5: Posterior disc osteophyte complex with mild spinal canal stenosis. Uncovertebral facet DJD with moderate bilateral neural foraminal narrowing. C5-C6: Posterior disc osteophyte complex and ligamentum flavum hypertrophy   with moderate spinal canal stenosis. Uncovertebral facet DJD with moderate   bilateral neural foraminal narrowing. C6-C7: No significant spinal canal or left neural foraminal narrowing.   Mild right neural foraminal stenosis. C7-T1: There is no significant disc protrusion, spinal canal stenosis or   neural foraminal narrowing. Physical therapy notes reviewed    Assessment and Plan:      1. Left carpal tunnel syndrome    2. Cervical spondylosis    3. History of CVA (cerebrovascular accident)          Plan: Continue physical therapy measures. Continue utilizing carpal tunnel brace. We will obtain EMG for further evaluation of left upper extremity weakness, numbness and tingling. Likely multifactorial secondary to prior stroke as well as median neuropathy. Follow up after EMG. Followup: Return in about 8 weeks (around 9/16/2022), or if symptoms worsen or fail to improve. Prescriptions Ordered:  No orders of the defined types were placed in this encounter. Orders Placed:  Orders Placed This Encounter   Procedures    EMG     Standing Status:   Future     Standing Expiration Date:   7/22/2023     Order Specific Question:   Which body part? Answer:   bilateral upper extremities        Electronically signed by STEF Valdez CNP on 7/22/2022 at 9:38 AM    Please note that this chart was generated using voice recognition Dragon dictation software. Although every effort was made to ensure the accuracy of this automated transcription, some errors in transcription may have occurred.

## 2022-08-02 DIAGNOSIS — K21.9 GASTROESOPHAGEAL REFLUX DISEASE, UNSPECIFIED WHETHER ESOPHAGITIS PRESENT: ICD-10-CM

## 2022-08-02 DIAGNOSIS — K21.9 LPRD (LARYNGOPHARYNGEAL REFLUX DISEASE): ICD-10-CM

## 2022-08-03 RX ORDER — FAMOTIDINE 40 MG/1
TABLET, FILM COATED ORAL
Qty: 90 TABLET | Refills: 1 | Status: SHIPPED | OUTPATIENT
Start: 2022-08-03

## 2022-08-03 NOTE — TELEPHONE ENCOUNTER
Last Appt:  4/20/2022  Next Appt:   10/21/2022  Med verified in [facility] [address]

## 2022-08-08 ENCOUNTER — HOSPITAL ENCOUNTER (EMERGENCY)
Age: 62
Discharge: HOME | End: 2022-08-09
Attending: SPECIALIST
Payer: COMMERCIAL

## 2022-08-08 DIAGNOSIS — G43.909 MIGRAINE SYNDROME: Primary | ICD-10-CM

## 2022-08-08 DIAGNOSIS — Z20.822 LAB TEST NEGATIVE FOR COVID-19 VIRUS: ICD-10-CM

## 2022-08-08 ASSESSMENT — PAIN - FUNCTIONAL ASSESSMENT: PAIN_FUNCTIONAL_ASSESSMENT: 0-10

## 2022-08-08 ASSESSMENT — PAIN SCALES - GENERAL: PAINLEVEL_OUTOF10: 9

## 2022-08-08 NOTE — Clinical Note
Anthony Lyn was seen and treated in our emergency department on 8/8/2022. She may return to work on 08/10/2022. If you have any questions or concerns, please don't hesitate to call.       Sade Cárdenas MD

## 2022-08-08 NOTE — Clinical Note
Yuridia Matthews was seen and treated in our emergency department on 8/8/2022. She may return to work on 08/09/2022. If you have any questions or concerns, please don't hesitate to call.       Malik Katz MD

## 2022-08-09 VITALS
HEART RATE: 69 BPM | BODY MASS INDEX: 27.66 KG/M2 | TEMPERATURE: 97.4 F | RESPIRATION RATE: 15 BRPM | OXYGEN SATURATION: 97 % | SYSTOLIC BLOOD PRESSURE: 135 MMHG | HEIGHT: 64 IN | WEIGHT: 162 LBS | DIASTOLIC BLOOD PRESSURE: 75 MMHG

## 2022-08-09 LAB
SARS-COV-2, RAPID: NOT DETECTED
SPECIMEN DESCRIPTION: NORMAL

## 2022-08-09 PROCEDURE — 6360000002 HC RX W HCPCS: Performed by: SPECIALIST

## 2022-08-09 PROCEDURE — 96375 TX/PRO/DX INJ NEW DRUG ADDON: CPT

## 2022-08-09 PROCEDURE — 96374 THER/PROPH/DIAG INJ IV PUSH: CPT

## 2022-08-09 PROCEDURE — 99284 EMERGENCY DEPT VISIT MOD MDM: CPT

## 2022-08-09 PROCEDURE — 2580000003 HC RX 258: Performed by: SPECIALIST

## 2022-08-09 PROCEDURE — 87635 SARS-COV-2 COVID-19 AMP PRB: CPT

## 2022-08-09 RX ORDER — ONDANSETRON 2 MG/ML
4 INJECTION INTRAMUSCULAR; INTRAVENOUS ONCE
Status: COMPLETED | OUTPATIENT
Start: 2022-08-09 | End: 2022-08-09

## 2022-08-09 RX ORDER — KETOROLAC TROMETHAMINE 30 MG/ML
30 INJECTION, SOLUTION INTRAMUSCULAR; INTRAVENOUS ONCE
Status: COMPLETED | OUTPATIENT
Start: 2022-08-09 | End: 2022-08-09

## 2022-08-09 RX ORDER — 0.9 % SODIUM CHLORIDE 0.9 %
500 INTRAVENOUS SOLUTION INTRAVENOUS ONCE
Status: COMPLETED | OUTPATIENT
Start: 2022-08-09 | End: 2022-08-09

## 2022-08-09 RX ADMIN — SODIUM CHLORIDE 500 ML: 9 INJECTION, SOLUTION INTRAVENOUS at 00:34

## 2022-08-09 RX ADMIN — KETOROLAC TROMETHAMINE 30 MG: 30 INJECTION, SOLUTION INTRAMUSCULAR at 00:36

## 2022-08-09 RX ADMIN — ONDANSETRON 4 MG: 2 INJECTION INTRAMUSCULAR; INTRAVENOUS at 00:38

## 2022-08-09 ASSESSMENT — ENCOUNTER SYMPTOMS
NAUSEA: 1
PHOTOPHOBIA: 1
VOMITING: 1

## 2022-08-09 NOTE — DISCHARGE INSTRUCTIONS
PLEASE RETURN TO THE EMERGENCY DEPARTMENT IMMEDIATELY if your symptoms worsen in anyway or in 8-12 hours if not improved for re-evaluation. You should immediately return to the ER for symptoms such as new or worsening pain, fever, visual or hearing changes, stiff neck, rash, a feeling of passing out, chest pain, shortness of breath, persistent nausea and/or vomiting, numbness or weakness to the arms or legs, coolness or color change of the arms or legs. Take your medication as indicated and prescribed. If you are given an antibiotic then, make sure you get the prescription filled and take the antibiotics until finished. Please understand that at this time there is no evidence for a more serious underlying process, but that early in the process of an illness or injury, an emergency department workup can be falsely reassuring. You should contact your family doctor within the next 24 hours for a follow up appointment    Mauri Singh!!!    From [facility]) and [address] [place] [address] Emergency Services    On behalf of the Emergency Department staff at [facility]), I would like to thank you for giving us the opportunity to address your health care needs and concerns. We hope that during your visit, our service was delivered in a professional and caring manner. Please keep [facility] in mind as we walk with you down the path to your own personal wellness. Please expect an automated text message or email from us so we can ask a few questions about your health and progress. Based on your answers, a clinician may call you back to offer help and instructions. Please understand that early in the process of an illness or injury, an emergency department workup can be falsely reassuring. If you notice any worsening, changing or persistent symptoms please call your family doctor or return to the ER immediately. Tell us how we did during your visit at [email or web address]   and let us know about your experience

## 2022-08-09 NOTE — ED PROVIDER NOTES
[address]      Pt Name: Adam Harden  MRN: [id number]  [place] 1960  Date of evaluation: 8/8/2022      CHIEF COMPLAINT       Chief Complaint   Patient presents with    Headache    Emesis         HISTORY OF PRESENT ILLNESS    Adam Harden is a 63 y.o. female who presents with a migraine headache which is present throughout both temples as well as top of the scalp. This a typical headache for the patient. Gradual onset but gradually got worse and is an  9 out of a 10. the patient  has photophobia without other visual complaints. There are no other HEENT complaints. There is no fever and no stiff neck. There are no peripheral neurologic complaints. There is nausea and 3 episodes of vomiting. There is no other chest or abdomen complaints. The patient has a long history of migraines. The patient is seeing a neurologist for their problem. The patient is using her usual home medications including Maxalt without relief of her pain. REVIEW OF SYSTEMS       Review of Systems   Eyes:  Positive for photophobia. Gastrointestinal:  Positive for nausea and vomiting. Neurological:  Positive for headaches. All other systems reviewed and are negative. PAST MEDICAL HISTORY    has a past medical history of Anxiety, Cerebrovascular disease, CVA (cerebral infarction), Facial weakness, Family history of colon cancer, Gastroesophageal reflux disease without esophagitis, History of TIAs, Hypoglycemia, Left hemiparesis ([address] Memory problem, Migraine, Sleep difficulties, Speech abnormality, Tobacco abuse, and Unspecified sleep apnea. SURGICAL HISTORY      has a past surgical history that includes Appendectomy; Hysterectomy; Ovarian cyst surgery; Tonsillectomy; Colonoscopy (4/29/2015); Cardiac catheterization (7-2-15); Foot surgery (Right); and Cholecystectomy.     CURRENT MEDICATIONS       Previous Medications    ACETAMINOPHEN (TYLENOL) 500 MG TABLET    Take 500 mg by mouth every 6 hours as needed for Pain (pain)    CELECOXIB (CELEBREX) 200 MG CAPSULE    Take 1 capsule by mouth daily    CHOLECALCIFEROL (VITAMIN D3 PO)    Take by mouth daily     CLOPIDOGREL (PLAVIX) 75 MG TABLET    TAKE 1 TABLET DAILY    CYANOCOBALAMIN (VITAMIN B 12 PO)    Take by mouth     DIPHENHYDRAMINE (BENADRYL) 25 MG CAPSULE    Take 25 mg by mouth every 6 hours as needed for Itching    FAMOTIDINE (PEPCID) 40 MG TABLET    TAKE 1 TABLET EVERY EVENING    GABAPENTIN (NEURONTIN) 300 MG CAPSULE        MAGNESIUM OXIDE (MAG-OX) 400 (240 MG) MG TABLET    Take 1 tablet by mouth daily    PREDNISONE (DELTASONE) 10 MG TABLET        PROBIOTIC PRODUCT (PROBIOTIC-10 PO)    Take by mouth    RABEPRAZOLE (ACIPHEX) 20 MG TABLET    TAKE 1 TABLET DAILY    RIZATRIPTAN (MAXALT) 10 MG TABLET    Take 1 tablet by mouth once as needed for Migraine May repeat in 2 hours if needed    SOLIFENACIN (VESICARE) 5 MG TABLET    TAKE 1 TABLET DAILY    VITAMIN C (ASCORBIC ACID) 500 MG TABLET    Take 500 mg by mouth daily       ALLERGIES     is allergic to tape [adhesive tape]. FAMILY HISTORY     She indicated that her mother is alive. She indicated that her father is alive. She indicated that her sister is . She indicated that four of her five brothers are alive. She indicated that her maternal grandmother is . She indicated that her maternal grandfather is . She indicated that her paternal grandmother is . She indicated that her paternal grandfather is . She indicated that all of her four daughters are alive. She indicated that all of her five grandsons are alive. She indicated that all of her four granddaughters are alive. family history includes Cancer in her brother, maternal grandfather, and maternal grandmother; Diabetes in her brother, brother, maternal grandmother, mother, and sister;  Heart Disease in her brother, father, and paternal grandfather; High Blood Pressure in her father; Mental Retardation in her sister; Other in her brother, grandson, and grandson; Thyroid Disease in her sister. SOCIAL HISTORY      reports that she has been smoking. She has a 10.00 pack-year smoking history. She has never used smokeless tobacco. She reports that she does not drink alcohol and does not use drugs. PHYSICAL EXAM     INITIAL VITALS:  height is 5' 4\" (1.626 m) and weight is 162 lb (73.5 kg). Her tympanic temperature is 97.4 °F (36.3 °C). Her blood pressure is 135/75 and her pulse is 69. Her respiration is 15 and oxygen saturation is 97%. Physical Exam  Vitals and nursing note reviewed. Constitutional:       General: She is not in acute distress. Appearance: She is well-developed. HENT:      Head: Normocephalic and atraumatic. Nose: Nose normal.   Eyes:      Extraocular Movements: Extraocular movements intact. Pupils: Pupils are equal, round, and reactive to light. Funduscopic exam:     Right eye: No papilledema. Left eye: No papilledema. Neck:      Meningeal: Brudzinski's sign and Kernig's sign absent. Cardiovascular:      Rate and Rhythm: Normal rate and regular rhythm. Heart sounds: Normal heart sounds. No murmur heard. Pulmonary:      Effort: Pulmonary effort is normal. No respiratory distress. Breath sounds: Normal breath sounds. Abdominal:      General: Bowel sounds are normal. There is no distension. Palpations: Abdomen is soft. Tenderness: There is no abdominal tenderness. Musculoskeletal:      Cervical back: Normal range of motion and neck supple. Skin:     General: Skin is warm and dry. Neurological:      General: No focal deficit present. Mental Status: She is alert and oriented to person, place, and time. GCS: GCS eye subscore is 4. GCS verbal subscore is 5. GCS motor subscore is 6. Cranial Nerves: Cranial nerves are intact. Sensory: Sensation is intact.       Motor: Motor function is disposition reasonable. The patient and/or family and I have discussed the diagnosis and risks, and we agree with discharging home to follow-up with their primary doctor. We also discussed returning to the Emergency Department immediately if new or worsening symptoms occur. We have discussed the symptoms which are most concerning (e.g., changing or worsening pain, weakness, vomiting, fever) that necessitate immediate return. I have reviewed the disposition diagnosis with the patient and or their family/guardian. I have answered their questions and given discharge instructions. They voiced understanding of these instructions and did not have any further questions or complaints. Re-evaluation Notes    Patient is feeling much better and resting comfortably. She remained hemodynamically stable and neurologically intact throughout the ED course. CRITICAL CARE:   None        CONSULTS:      PROCEDURES:  None    FINAL IMPRESSION      1. Migraine syndrome    2. Lab test negative for COVID-19 virus          DISPOSITION/PLAN   DISPOSITION Decision To Discharge    Condition on Disposition    Stable    PATIENT REFERRED TO:  Zuly Flores, [address]  [address]  [phone]    Call in 2 days  For reevaluation of current symptoms    [address] [facility] [address]  [phone]    If symptoms worsen      DISCHARGE MEDICATIONS:  New Prescriptions    No medications on file       (Please note that portions of this note were completed with a voice recognition program.  Efforts were made to edit the dictations but occasionally words are mis-transcribed.)    Arnaldo Hillman MD,, MD, F.A.C.E.P.   Attending Emergency Physician                           Arnaldo Hillman MD  08/09/22 0125

## 2022-08-12 ENCOUNTER — HOSPITAL ENCOUNTER (OUTPATIENT)
Dept: MAMMOGRAPHY | Age: 62
Discharge: HOME | End: 2022-08-14
Payer: COMMERCIAL

## 2022-08-12 DIAGNOSIS — Z12.31 VISIT FOR SCREENING MAMMOGRAM: ICD-10-CM

## 2022-08-12 PROCEDURE — 77063 BREAST TOMOSYNTHESIS BI: CPT

## 2022-08-15 ENCOUNTER — TELEPHONE (OUTPATIENT)
Dept: FAMILY MEDICINE CLINIC | Age: 62
End: 2022-08-15

## 2022-08-15 NOTE — TELEPHONE ENCOUNTER
----- Message from Yuri [place], [place] sent at 8/12/2022  3:30 PM EDT -----  Normal mammogram.  Continue annual mammogram and monthly SBE.

## 2022-09-19 NOTE — PROGRESS NOTES
[facility]             [address] [place] [address], [facility] [address]                        Telephone [phone]             Fax [phone]       Anushka Ruby  1960  MRN:  [id number]  Date of visit:  7/31/18    Subjective:    Anushka Ruby is a 66 y.o.  female who presents to [facility] today (7/31/18) for evaluation of:  Cough (for 3 weeks) and Hematuria (dysuria)      She states that she has had a cough for the past 3 weeks. The cough is not productive. She states that she had a fever for the first few days, but she has not had a fever recently. She also complains of sinus pressure. She states that she saw blood in her urine a couple of weeks ago. She has some occasional dysuria also.     Current medications are:  Current Outpatient Prescriptions   Medication Sig Dispense Refill    Probiotic Product (PROBIOTIC DAILY PO) Take 1 capsule by mouth daily      cetirizine (ZYRTEC) 10 MG tablet Take 1 tablet by mouth daily 30 tablet 6    Compression Stockings MISC by Does not apply route Bilateral knee high, 20-30 mmHg 2 each 0    pantoprazole (PROTONIX) 40 MG tablet TAKE 1 TABLET DAILY 90 tablet 0    clopidogrel (PLAVIX) 75 MG tablet Take 1 tablet by mouth daily 90 tablet 0    fluconazole (DIFLUCAN) 150 MG tablet 1 po now, may repeat in 1 week if symptoms persist 2 tablet 0    buPROPion (WELLBUTRIN SR) 100 MG extended release tablet Take 1 tablet by mouth 2 times daily 180 tablet 1    Urea 40 % LOTN Apply 1 Units topically daily 1 Bottle 1    Formaldehyde 10 % SOLN Apply 1 applicator topically daily as needed (plantar wart) 120 mL 0    Potassium (GNP POTASSIUM) 99 MG TABS Take 1 tablet by mouth 6 a day      Pyridoxine HCl (VITAMIN B-6) 50 MG tablet Take 50 mg by mouth daily      Ascorbic Acid (VITAMIN C) 500 MG tablet Take 500 mg by mouth 2 times daily      Cyanocobalamin (VITAMIN B 12 PO) Take by TeleMedicine Patient Consent    This visit was performed as a virtual video visit using a synchronous, two-way, audio-video telehealth technology platform. Patient identification was verified at the start of the visit, including the patient's telephone number and physical location. I discussed with the patient the nature of our telehealth visits, that:     Due to the nature of an audio- video modality, the only components of a physical exam that could be done are the elements supported by direct observation. I would evaluate the patient and recommend diagnostics and treatments based on my assessment. If it was felt that the patient should be evaluated in clinic or an emergency room setting, then they would be directed there. Our sessions are not being recorded and that personal health information is protected. Our team would provide follow up care in person if/when the patient needs it. Patient does agree to proceed with telemedicine consultation. Patient's location: home address in [place]. Is there anyone else present for this visit: No  This visit was completed virtually using [email or web address] me    Physician Location:   [address] [facility], [address] [address]    Time spent: Greater than Not billed by time    2022    TELEHEALTH EVALUATION -- Audio or Visual (During XEAFO-82 public health emergency)    HPI:    Aniyah Snyder (:  1972) has requested an audio/video evaluation for the following concern(s):    RUQ pain, fatty liver in family, mom had cirrhosis. Denies etoh abuse. Impression   1. Slightly increased echogenicity of the liver may reflect hepatic   steatosis. No focal mass. 2.  Gallbladder and bile ducts are within normal limits.        ROS Unless otherwise specified  Review of Systems - General ROS: negative for - chills, fatigue, fever, night sweats, sleep disturbance, weight gain or weight loss  Psychological ROS: negative for - anxiety, Final    Glucose, Ur 07/31/2018 NEGATIVE  NEG Final    Bilirubin Urine 07/31/2018 NEGATIVE  NEG Final    Ketones, Urine 07/31/2018 NEGATIVE  NEG Final    Specific Gravity, UA 07/31/2018 1.005* 1.010 - 1.025 Final    Urine Hgb 07/31/2018 NEGATIVE  NEG Final    pH, UA 07/31/2018 6.0  5.0 - 6.0 Final    Protein, UA 07/31/2018 NEGATIVE  NEG Final    Urobilinogen, Urine 07/31/2018 Normal  NORM Final    Nitrite, Urine 07/31/2018 NEGATIVE  NEG Final    Leukocyte Esterase, Urine 07/31/2018 NEGATIVE  NEG Final    Urinalysis Comments 07/31/2018 NOT REPORTED   Final    - 07/31/2018        Final    WBC, UA 07/31/2018 None  0 - 4 /HPF Final    RBC, UA 07/31/2018 None  0 - 4 /HPF Final    Casts UA 07/31/2018 NOT REPORTED  0 - 2 /LPF Final    Crystals UA 07/31/2018 NOT REPORTED  NONE /HPF Final    Epithelial Cells UA 07/31/2018 0 TO 4  0 - 5 /HPF Final    Renal Epithelial, Urine 07/31/2018 NOT REPORTED  0 /HPF Final    Bacteria, UA 07/31/2018 None  NONE Final    Mucus, UA 07/31/2018 NOT REPORTED  NONE Final    Trichomonas, UA 07/31/2018 NOT REPORTED  NONE Final    Amorphous, UA 07/31/2018 NOT REPORTED  NONE Final    Other Observations UA 07/31/2018 NOT REPORTED  NREQ Final    Yeast, UA 07/31/2018 NOT REPORTED  NONE Final          Assessment & Plan:    1. Bronchitis  - azithromycin (ZITHROMAX) 250 MG tablet; Take 2 po Day 1, then 1 po Daily Days 2-5. Take with food. Dispense: 1 packet; Refill: 0  - benzonatate (TESSALON) 200 MG capsule; Take 1 capsule by mouth 3 times daily as needed for Cough  Dispense: 30 capsule; Refill: 0    She was advised to follow up if symptoms worsen or do not resolve. 2. Hematuria, unspecified type  She was advised that the urinalysis today was within normal limits. She was advised to return for another urinalysis if the hematuria recurs.     3. History of vaginitis  She stated that she frequently gets yeast infections when she takes antibiotics, so Diflucan was prescribed MD       Social History     Tobacco Use    Smoking status: Never    Smokeless tobacco: Never   Substance Use Topics    Alcohol use: No    Drug use: Never     Types: Marijuana Berneta Bartolome)     Comment: last smoked 22- medical has a card        Allergies   Allergen Reactions    Latex Dermatitis, Itching, Rash and Swelling    Nsaids Other (See Comments)   ,   Past Medical History:   Diagnosis Date    Anxiety     Chronic pain of right knee     Depression     Endometrioma of ovary 2022    Female pelvic peritoneal adhesions 2022    History of goiter     Irritable bowel syndrome with both constipation and diarrhea     Menometrorrhagia 2022    Status post abdominal hysterectomy 2022    Thyroid disease    ,   Past Surgical History:   Procedure Laterality Date     SECTION, LOW TRANSVERSE      COLONOSCOPY      THYROIDECTOMY, PARTIAL  2008    TOTAL ABDOMINAL HYSTERECTOMY Bilateral 2022    TOTAL ABDOMINAL HYSTERECTOMY WITH BILATERAL SALPINGO-OOPHORECTOMY performed by Ellie Borden MD at [address]   ,   Social History     Tobacco Use    Smoking status: Never    Smokeless tobacco: Never   Substance Use Topics    Alcohol use: No    Drug use: Never     Types: Marijuana (Ethelyn Boeck)     Comment: last smoked 22- medical has a card   ,   Family History   Problem Relation Age of Onset    Cirrhosis Mother     Cancer Father         colon   ,   Immunization History   Administered Date(s) Administered    COVID-19, MODERNA BLUE border, Primary or Immunocompromised, (age 12y+), IM, 100 mcg/0.5mL 2021, 2021   ,   Health Maintenance   Topic Date Due    HIV screen  Never done    DTaP/Tdap/Td vaccine (1 - Tdap) Never done    COVID-19 Vaccine (3 - Booster for Moderna series) 10/26/2021    Flu vaccine (1) Never done    Depression Screen  2023    Lipids  2027    Colorectal Cancer Screen  2031    Hepatitis C screen  Completed    Hepatitis A vaccine  Aged Out    Hepatitis B vaccine  Aged Out    Hib vaccine  Aged Out    Meningococcal (ACWY) vaccine  Aged Out    Pneumococcal 0-64 years Vaccine  Aged Out       PHYSICAL EXAMINATION:  [ INSTRUCTIONS:  \"[x]\" Indicates a positive item  \"[]\" Indicates a negative item  -- DELETE ALL ITEMS NOT EXAMINED]  Vital Signs: (As obtained by patient/caregiver or practitioner observation)    Blood pressure-  Heart rate-    Respiratory rate-    Temperature-  Pulse oximetry-     Constitutional: [x] Appears well-developed and well-nourished [] No apparent distress      [x] Abnormal-   Mental status  [x] Alert and awake  [x] Oriented to person/place/time [x]Able to follow commands      Eyes:  EOM    [x]  Normal  [] Abnormal-  Sclera  [x]  Normal  [] Abnormal -         Discharge [x]  None visible  [] Abnormal -    HENT:   [x] Normocephalic, atraumatic. [] Abnormal   [x] Mouth/Throat: Mucous membranes are moist.     External Ears [x] Normal  [] Abnormal-     Neck: [x] No visualized mass     Pulmonary/Chest: [x] Respiratory effort normal.  [x] No visualized signs of difficulty breathing or respiratory distress        [] Abnormal-      Musculoskeletal:   [] Normal gait with no signs of ataxia         [x] Normal range of motion of neck        [] Abnormal-       Neurological:        [x] No Facial Asymmetry (Cranial nerve 7 motor function) (limited exam to video visit)          [] No gaze palsy        [] Abnormal-         Skin:        [x] No significant exanthematous lesions or discoloration noted on facial skin         [] Abnormal-            Psychiatric:       [x] Normal Affect [x] No Hallucinations        [] Abnormal-       Other pertinent observable physical exam findings-     Due to this being a TeleHealth encounter, evaluation of the following organ systems is limited: Vitals/Constitutional/EENT/Resp/CV/GI//MS/Neuro/Skin/Heme-Lymph-Imm. ASSESSMENT/PLAN:  Leonor Sharma was seen today for other.     Diagnoses and all orders for this visit:    Fatty liver  Mixed also:  - fluconazole (DIFLUCAN) 150 MG tablet; Take one po. May repeat in 3 days prn. Dispense: 2 tablet;  Refill: 0        (Please note that portions of this note were completed with a voice-recognition program. Efforts were made to edit the dictation but occasionally words are mis-transcribed.) hyperlipidemia  - The patient is asked to make an attempt to improve diet and exercise patterns to aid in medical management of this problem. 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 An  electronic signature was used to authenticate this note. --Ghazal Hutchinson, DO on 9/19/2022 at 4:08 }    Pursuant to the emergency declaration under the [address] [place] [address], [facility] waiver authority and the [employer] Act, this Virtual  Visit was conducted, with patient's consent, to reduce the patient's risk of exposure to COVID-19 and provide continuity of care for an established patient. Services were provided through a video synchronous discussion virtually to substitute for in-person clinic visit.

## 2022-10-21 ENCOUNTER — TELEPHONE (OUTPATIENT)
Dept: SURGERY | Age: 62
End: 2022-10-21

## 2022-10-21 ENCOUNTER — OFFICE VISIT (OUTPATIENT)
Dept: FAMILY MEDICINE CLINIC | Age: 62
End: 2022-10-21
Payer: COMMERCIAL

## 2022-10-21 VITALS
DIASTOLIC BLOOD PRESSURE: 70 MMHG | WEIGHT: 177 LBS | HEIGHT: 64 IN | BODY MASS INDEX: 30.22 KG/M2 | SYSTOLIC BLOOD PRESSURE: 122 MMHG | HEART RATE: 80 BPM

## 2022-10-21 DIAGNOSIS — Z80.0 FAMILY HISTORY OF COLON CANCER: ICD-10-CM

## 2022-10-21 DIAGNOSIS — I63.9 CEREBROVASCULAR ACCIDENT (CVA), UNSPECIFIED MECHANISM (HCC): Primary | ICD-10-CM

## 2022-10-21 DIAGNOSIS — R73.01 IFG (IMPAIRED FASTING GLUCOSE): ICD-10-CM

## 2022-10-21 DIAGNOSIS — F43.9 STRESS: ICD-10-CM

## 2022-10-21 DIAGNOSIS — K21.9 GASTROESOPHAGEAL REFLUX DISEASE, UNSPECIFIED WHETHER ESOPHAGITIS PRESENT: ICD-10-CM

## 2022-10-21 PROCEDURE — 99214 OFFICE O/P EST MOD 30 MIN: CPT | Performed by: PHYSICIAN ASSISTANT

## 2022-10-21 RX ORDER — ESCITALOPRAM OXALATE 5 MG/1
5 TABLET ORAL DAILY
Qty: 30 TABLET | Refills: 3 | Status: SHIPPED | OUTPATIENT
Start: 2022-10-21

## 2022-10-21 SDOH — ECONOMIC STABILITY: FOOD INSECURITY: WITHIN THE PAST 12 MONTHS, THE FOOD YOU BOUGHT JUST DIDN'T LAST AND YOU DIDN'T HAVE MONEY TO GET MORE.: NEVER TRUE

## 2022-10-21 SDOH — ECONOMIC STABILITY: TRANSPORTATION INSECURITY
IN THE PAST 12 MONTHS, HAS LACK OF TRANSPORTATION KEPT YOU FROM MEETINGS, WORK, OR FROM GETTING THINGS NEEDED FOR DAILY LIVING?: NO

## 2022-10-21 SDOH — ECONOMIC STABILITY: TRANSPORTATION INSECURITY
IN THE PAST 12 MONTHS, HAS THE LACK OF TRANSPORTATION KEPT YOU FROM MEDICAL APPOINTMENTS OR FROM GETTING MEDICATIONS?: NO

## 2022-10-21 SDOH — ECONOMIC STABILITY: FOOD INSECURITY: WITHIN THE PAST 12 MONTHS, YOU WORRIED THAT YOUR FOOD WOULD RUN OUT BEFORE YOU GOT MONEY TO BUY MORE.: NEVER TRUE

## 2022-10-21 ASSESSMENT — SOCIAL DETERMINANTS OF HEALTH (SDOH): HOW HARD IS IT FOR YOU TO PAY FOR THE VERY BASICS LIKE FOOD, HOUSING, MEDICAL CARE, AND HEATING?: NOT HARD AT ALL

## 2022-10-21 NOTE — PROGRESS NOTES
CHIEF COMPLAINT  Chief Complaint   Patient presents with    Anxiety    Cerebrovascular Accident       SUBJECTIVE  Cornell Meckel is a 63 y.o. female who presents to the office for follow-up of chronic medical conditions. Patient admits to an increased level of stress recently. Patient is being mandated weekends at work. Her father is currently on hospice for metastatic prostrate cancer. Patient feels overwhelmed. She gets irritable and snappy. She tried [address] without change to symptoms. Patient is due for laboratory studies and agrees to completing. Patient reports compliance with prescribed medications. Patient denies any additional concerns or complaints today. ROS  All other review of systems negative, except for those noted.     PAST MEDICAL HISTORY    Past Medical History:   Diagnosis Date    Anxiety     Cerebrovascular disease     Mini stroke in 2006    CVA (cerebral infarction)     Facial weakness     left    Family history of colon cancer     Gastroesophageal reflux disease without esophagitis 5/31/2016    History of TIAs     Hypoglycemia     Left hemiparesis ([facility] [address]     Memory problem     Migraine     Sleep difficulties     Speech abnormality     Tobacco abuse     Unspecified sleep apnea        SURGICAL HISTORY    Past Surgical History:   Procedure Laterality Date    APPENDECTOMY      CARDIAC CATHETERIZATION  7-2-15    nml    CHOLECYSTECTOMY      COLONOSCOPY  4/29/2015    2 polyps, sigmoid diverticulosis    FOOT SURGERY Right     HYSTERECTOMY (CERVIX STATUS UNKNOWN)      OVARIAN CYST SURGERY      TONSILLECTOMY         FAMILY HISTORY    Family History   Problem Relation Age of Onset    Diabetes Mother     Heart Disease Father         afib    High Blood Pressure Father     Diabetes Sister     Thyroid Disease Sister     Mental Retardation Sister     Cancer Brother         colon, prostate, lung    Diabetes Brother     Diabetes Brother     Other Brother         hyperglycemia    Heart Disease Brother         atrial fibrillation    Breast Cancer Maternal Grandmother     Diabetes Maternal Grandmother     Cancer Maternal Grandmother         breast    Cancer Maternal Grandfather         brain    Heart Disease Paternal Grandfather     Other Grandson         2nd Grandson with Hunters Syndrome    Other Grandson         Jack Syndrome/Jack Syndrome       SOCIAL HISTORY    Social History     Socioeconomic History    Marital status:      Spouse name: None    Number of children: None    Years of education: None    Highest education level: None   Occupational History     Employer: NONE   Tobacco Use    Smoking status: Every Day     Packs/day: 0.50     Years: 20.00     Pack years: 10.00     Types: Cigarettes    Smokeless tobacco: Never   Vaping Use    Vaping Use: Never used   Substance and Sexual Activity    Alcohol use: No     Alcohol/week: 0.0 standard drinks    Drug use: No     Social Determinants of Health     Financial Resource Strain: Low Risk     Difficulty of Paying Living Expenses: Not hard at all   Food Insecurity: No Food Insecurity    Worried About Running Out of Food in the Last Year: Never true    Ran Out of Food in the Last Year: Never true   Transportation Needs: No Transportation Needs    Lack of Transportation (Medical): No    Lack of Transportation (Non-Medical): No       MEDICATIONS  Current Outpatient Medications   Medication Sig Dispense Refill    escitalopram (LEXAPRO) 5 MG tablet Take 1 tablet by mouth daily 30 tablet 3    famotidine (PEPCID) 40 MG tablet TAKE 1 TABLET EVERY EVENING 90 tablet 1    gabapentin (NEURONTIN) 300 MG capsule       celecoxib (CELEBREX) 200 MG capsule Take 1 capsule by mouth daily 90 capsule 1    RABEprazole (ACIPHEX) 20 MG tablet TAKE 1 TABLET DAILY 90 tablet 1    clopidogrel (PLAVIX) 75 MG tablet TAKE 1 TABLET DAILY 90 tablet 1    solifenacin (VESICARE) 5 MG tablet TAKE 1 TABLET DAILY 90 tablet 1    magnesium oxide (MAG-OX) 400 (240 Mg) MG tablet Take 1 tablet by mouth daily 30 tablet 3    diphenhydrAMINE (BENADRYL) 25 MG capsule Take 25 mg by mouth every 6 hours as needed for Itching      Probiotic Product (PROBIOTIC-10 PO) Take by mouth      vitamin C (ASCORBIC ACID) 500 MG tablet Take 500 mg by mouth daily      acetaminophen (TYLENOL) 500 MG tablet Take 500 mg by mouth every 6 hours as needed for Pain (pain)      Cyanocobalamin (VITAMIN B 12 PO) Take by mouth       Cholecalciferol (VITAMIN D3 PO) Take by mouth daily       rizatriptan (MAXALT) 10 MG tablet Take 1 tablet by mouth once as needed for Migraine May repeat in 2 hours if needed 10 tablet 0     No current facility-administered medications for this visit. ALLERGIES  Allergies   Allergen Reactions    Tape Marquis Herron Tape] Rash       PHYSICAL EXAM:   Vital Signs: /70 (Site: Left Upper Arm, Position: Sitting, Cuff Size: Large Adult)   Pulse 80   Ht 5' 4\" (1.626 m)   Wt 177 lb (80.3 kg)   BMI 30.38 kg/m²   Constitutional:  Alert and oriented x 3   HENT:  Normocephalic, Atraumatic, Bilateral external ears normal, Oropharynx moist, No oral exudates, Nose normal. Neck- Normal range of motion, No tenderness, Supple, No stridor. Eyes:  PERRL, Conjunctiva normal, No discharge. Respiratory:  Normal breath sounds, No respiratory distress, No wheezing, No chest tenderness. Cardiovascular:  Normal heart rate, Normal rhythm. GI:  Bowel sounds normal, Soft, No tenderness, No masses, No pulsatile masses. Musculoskeletal:  Intact distal pulses, No edema, No tenderness, No cyanosis, No clubbing. Good range of motion in all major joints. No tenderness to palpation or major deformities noted. Back- No tenderness. Integument:  Warm, Dry, No erythema, No rash. Lymphatic:  No lymphadenopathy noted. Neurologic:  Alert & oriented x 3, Normal motor function, Normal sensory function, No focal deficits noted.    Psychiatric:  Affect normal, Mood normal.     RESULTS  Ordered in this encounter. FINAL DIAGNOSIS AND ORDERS   Diagnosis Orders   1. Cerebrovascular accident (CVA), unspecified mechanism ([address]  Lipid Panel    Hemoglobin A1C      2. Stress  escitalopram (LEXAPRO) 5 MG tablet      3. IFG (impaired fasting glucose)  Comprehensive Metabolic Panel    TSH With Reflex Ft4      4. Family history of colon cancer  [address], [facility], , General Surgery, [place]      5. Gastroesophageal reflux disease, unspecified whether esophagitis present            ASSESSMENT & PLAN  1. Interval history reviewed with patient. [address]. Lexapro 5 mg daily. Coping strategies discussed. Continue chronic medications. Healthy diet and routine exercise. Referral for colonoscopy update. Follow-up in 3 months/sooner PRN.     DISCHARGE MEDS  Outpatient Encounter Medications as of 10/21/2022   Medication Sig Dispense Refill    escitalopram (LEXAPRO) 5 MG tablet Take 1 tablet by mouth daily 30 tablet 3    famotidine (PEPCID) 40 MG tablet TAKE 1 TABLET EVERY EVENING 90 tablet 1    gabapentin (NEURONTIN) 300 MG capsule       celecoxib (CELEBREX) 200 MG capsule Take 1 capsule by mouth daily 90 capsule 1    RABEprazole (ACIPHEX) 20 MG tablet TAKE 1 TABLET DAILY 90 tablet 1    clopidogrel (PLAVIX) 75 MG tablet TAKE 1 TABLET DAILY 90 tablet 1    solifenacin (VESICARE) 5 MG tablet TAKE 1 TABLET DAILY 90 tablet 1    magnesium oxide (MAG-OX) 400 (240 Mg) MG tablet Take 1 tablet by mouth daily 30 tablet 3    diphenhydrAMINE (BENADRYL) 25 MG capsule Take 25 mg by mouth every 6 hours as needed for Itching      Probiotic Product (PROBIOTIC-10 PO) Take by mouth      vitamin C (ASCORBIC ACID) 500 MG tablet Take 500 mg by mouth daily      acetaminophen (TYLENOL) 500 MG tablet Take 500 mg by mouth every 6 hours as needed for Pain (pain)      Cyanocobalamin (VITAMIN B 12 PO) Take by mouth       Cholecalciferol (VITAMIN D3 PO) Take by mouth daily       [DISCONTINUED] predniSONE (DELTASONE) 10 MG tablet rizatriptan (MAXALT) 10 MG tablet Take 1 tablet by mouth once as needed for Migraine May repeat in 2 hours if needed 10 tablet 0     No facility-administered encounter medications on file as of 10/21/2022.

## 2022-10-31 DIAGNOSIS — N32.81 OVERACTIVE BLADDER: ICD-10-CM

## 2022-10-31 NOTE — TELEPHONE ENCOUNTER
[facility] called requesting a refill of the below medication which has been pended for you:     Requested Prescriptions     Pending Prescriptions Disp Refills    solifenacin (VESICARE) 5 MG tablet [Pharmacy Med Name: SOLIFENACIN  TAB 5MG] 90 tablet 1     Sig: TAKE 1 TABLET DAILY       Last Appointment Date: 10/21/2022  Next Appointment Date: 1/25/2023    Allergies   Allergen Reactions    Tape Елена oCx] Rash

## 2022-11-01 RX ORDER — SOLIFENACIN SUCCINATE 5 MG/1
TABLET, FILM COATED ORAL
Qty: 90 TABLET | Refills: 1 | Status: SHIPPED | OUTPATIENT
Start: 2022-11-01

## 2022-11-08 ENCOUNTER — TELEPHONE (OUTPATIENT)
Dept: FAMILY MEDICINE CLINIC | Age: 62
End: 2022-11-08

## 2022-11-08 RX ORDER — BUSPIRONE HYDROCHLORIDE 7.5 MG/1
7.5 TABLET ORAL 2 TIMES DAILY PRN
Qty: 30 TABLET | Refills: 0 | Status: SHIPPED | OUTPATIENT
Start: 2022-11-08 | End: 2022-12-08

## 2022-11-08 NOTE — TELEPHONE ENCOUNTER
Pt calling stating she was given an anti-depressant to get through some tough times but pt states her dad just passed away and she's going to needs a little something more to help her get through this, pt uses Maribell Rash, please advise.

## 2022-12-05 DIAGNOSIS — K21.9 GASTROESOPHAGEAL REFLUX DISEASE, UNSPECIFIED WHETHER ESOPHAGITIS PRESENT: ICD-10-CM

## 2022-12-05 DIAGNOSIS — K21.9 LPRD (LARYNGOPHARYNGEAL REFLUX DISEASE): ICD-10-CM

## 2022-12-05 RX ORDER — RABEPRAZOLE SODIUM 20 MG/1
TABLET, DELAYED RELEASE ORAL
Qty: 90 TABLET | Refills: 1 | Status: SHIPPED | OUTPATIENT
Start: 2022-12-05

## 2022-12-13 ENCOUNTER — HOSPITAL ENCOUNTER (OUTPATIENT)
Dept: NEUROLOGY | Age: 62
Discharge: HOME | End: 2022-12-13
Payer: COMMERCIAL

## 2022-12-13 DIAGNOSIS — G56.02 LEFT CARPAL TUNNEL SYNDROME: ICD-10-CM

## 2022-12-13 PROCEDURE — 95912 NRV CNDJ TEST 11-12 STUDIES: CPT

## 2022-12-13 PROCEDURE — 95886 MUSC TEST DONE W/N TEST COMP: CPT

## 2022-12-13 NOTE — PROCEDURES
[facility] [address]                 [address] [place] [id number]                        EMG/NERVE CONDUCTION STUDIES REPORT      PATIENT NAME: Diane Fam                  :        1960  MED REC NO:   [id number]                             ROOM:  ACCOUNT NO:   [de-identified]                           ADMIT DATE: 2022      PROVIDER:     Lisa Barahona MD    DATE OF EM2022    REFERRING PROVIDER:  Jasvir Braswell CNP    PRIMARY CARE PROVIDER: Lesli Dorsey. RUSSELL Greene    TECHNICAL SUMMARY:  The nature, purpose, goals, expectations and process  involved in the procedures of nerve conduction studies and needle  electromyography were reviewed, discussed, explained and verbal consent  was obtained from the patient. The patient's questions were answered  with reference to the above processes and procedures. There were no significant technical difficulties encountered during this  study and nerve conduction studies were performed under temperature  monitoring. CLINICAL DATA:      The patient is [age] [address] old with a history of numbness,  tingling, paresthesias involving both hands at least for the last 6  months, dropping objects, weakness involving the hands. The patient has  previous history of carpal tunnel syndrome. The purpose of the study is to evaluate for mononeuropathy,  radiculopathy, peripheral polyneuropathy. SUMMARY:      The sensory nerve action potentials of the right and left  radial nerves are within normal limits. Sensory nerve action potentials of the right and left median nerve shows  borderline amplitudes and prolonged distal latencies, more so on the  right side. Ulnar sensory nerve action potentials are within normal limits.     Mixed nerve palmar potential shows low median amplitudes on the right  side, normal amplitudes on the left side with significantly increased  median to ulnar distal latency difference bilaterally. Ulnar palmar potentials appear within normal limits. Compound muscle action potentials of the right and left median nerve  shows normal amplitudes, distal latencies, and conduction velocities. Compound muscle action potentials of the right  ulnar nerve  shows normal amplitudes, distal latencies, and conduction velocities. The left ulnar amplitude shows mildly low amplitude above elbow with  normal conduction velocity. Proximal conductions as measured by the F responses were normal in both  median and ulnar nerves bilaterally. Nerve  Conductions   Results  Were  Personally  Reviewed and  Analysed. Abnormal  Nerve  Conductions  Were  Personally  Repeated,  Verified, reconfirmed  And  Updated as needed  appropriately. Please    See   Wave  Forms   And    Details  Of     Nerve  Conduction   Studies   For  Additional  Information             Extensive   Needle  Electromyography  Was personally  Performed  In  Both      Upper   Extremities      In  The  Following  Muscles :    Deltoid,  Biceps  Brachii,  Triceps . Pronator  Teres,  Flexor Carpi Ulnaris,    Ext. Digitorum Communis,  FDI (Hand)        Extensive  Needle  Electromyography  Shows  No   Abnormality with :       A) Normal  insertional  Activity. There  Is  Absence  Of   P  Waves,  Fibrillations,  Fasciculations and        Other  Spontaneous   Activity. B) Voluntary  Motor unit  Potentials    Show :    Normal  Effort,  Recruitment, amplitude,  Duration. No Polyphasia  Noted. IMPRESSION:      1. There is electrodiagnostic evidence of moderate median        mononeuropathy at wrist (carpal tunnel syndrome) bilaterally. 2.  There is electrodiagnostic evidence of mild ulnar motor neuropathy         above elbow on the left side.       3.  There is no definite electrodiagnostic evidence of lower cervical       radiculopathy or peripheral polyneuropathy involving examined both        upper extremities. Further clinical correlation and followup recommended. Christie Woodall MD, [address] [place] [address]     Board Certified in Neurology  & in  [address] of Psychiatry and Neurology ([facility]).        D: 12/13/2022 15:42:55       T: 12/13/2022 16:53:59     [email or web address]  Job#: [id number]     Doc#: [id number]    CC:    STEF Armstrong - CNP         RUSSELL Rivers (PCP)

## 2022-12-13 NOTE — PROGRESS NOTES
EMG/NCS Bilateral    upper Completed    PCP: RUSSELL Hickman    Ordering: Georgette Newell. Briseyda Neurologist    Interpreting Physician: Georgette Newell.  Briseyda Neurologisrrael    Technician: Ashley Bustillo RCP

## 2023-01-08 DIAGNOSIS — M19.071 ARTHRITIS OF BOTH FEET: ICD-10-CM

## 2023-01-08 DIAGNOSIS — M19.072 ARTHRITIS OF BOTH FEET: ICD-10-CM

## 2023-01-09 RX ORDER — CELECOXIB 200 MG/1
CAPSULE ORAL
Qty: 90 CAPSULE | Refills: 1 | Status: SHIPPED | OUTPATIENT
Start: 2023-01-09

## 2023-01-16 DIAGNOSIS — I63.9 CEREBROVASCULAR ACCIDENT (CVA), UNSPECIFIED MECHANISM (HCC): ICD-10-CM

## 2023-01-16 RX ORDER — CLOPIDOGREL BISULFATE 75 MG/1
TABLET ORAL
Qty: 90 TABLET | Refills: 1 | Status: SHIPPED | OUTPATIENT
Start: 2023-01-16

## 2023-02-20 ENCOUNTER — HOSPITAL ENCOUNTER (OUTPATIENT)
Dept: PHYSICAL THERAPY | Age: 63
Setting detail: THERAPIES SERIES
Discharge: HOME | End: 2023-02-20
Payer: COMMERCIAL

## 2023-02-20 PROCEDURE — 97161 PT EVAL LOW COMPLEX 20 MIN: CPT | Performed by: PHYSICAL THERAPIST

## 2023-02-20 ASSESSMENT — PAIN DESCRIPTION - LOCATION: LOCATION: ANKLE;FOOT

## 2023-02-20 ASSESSMENT — PAIN DESCRIPTION - ORIENTATION: ORIENTATION: RIGHT

## 2023-02-20 ASSESSMENT — PAIN DESCRIPTION - DESCRIPTORS: DESCRIPTORS: ACHING;SHOOTING

## 2023-02-20 ASSESSMENT — PAIN DESCRIPTION - PAIN TYPE: TYPE: CHRONIC PAIN

## 2023-02-20 ASSESSMENT — PAIN SCALES - GENERAL: PAINLEVEL_OUTOF10: 3

## 2023-02-20 NOTE — PROGRESS NOTES
Physical Therapy  Initial Assessment  Date: 2023  Patient Name: Maribel Roger  MRN: [id number]  : 1960    Referring Physician: Mahogany Morocho   PCP: RUSSELL Vasques     Medical Diagnosis: Posterior tibial tendinitis, right leg [M76.821]  Plantar fascial fibromatosis [M72.2] M76.821 R tibial tendinitis , M72.2 R plantar faascitis  Treatment Diagnosis: R ankle post tibialis tendinitis      Insurance: Payor: [employer] / Plan: [employer]ang[employer] / Product Type: *No Product type* /   Insurance ID: [id number] - ([facility])      Restrictions:       Subjective:   General  Chart Reviewed: Yes  Patient Assessed for Rehabilitation Services: Yes  History obtained from[de-identified] Patient  Family/Caregiver Present: No  Diagnosis: M76.821 R tibial tendinitis , M72.2 R plantar faascitis  Referring Provider (secondary): Lynne Morocho  Follows Commands: Within Functional Limits  PT Visit Information  Onset Date: 23  PT Insurance Information: [facility]  Referring Provider (secondary): Lynne Morocho  Subjective  Subjective: Has had R ankle foot pain for approx 2 yrs. No obvious injury/trauma. Did have a surgery done on the foot ankle about 15 yr ago in [place].   Comment: Has off the shelf orthotics with little change  Pain Screening  Patient Currently in Pain: Yes  Pain Assessment: 0-10  Pain Level: 3  Best Pain Level: 4  Worst Pain Level: 10  Pain Type: Chronic pain  Pain Location: Ankle, Foot  Pain Orientation: Right  Pain Descriptors: Aching, Shooting       Vision/Hearing:  Vision  Vision: Within Functional Limits  Hearing  Hearing: Within functional limits    Orientation:  Orientation  Overall Orientation Status: Within Normal Limits  Follows Commands: Within Functional Limits    Social History:  Social History  Lives With: Spouse  Type of Home: House  Home Layout: One level  Home Access: Stairs to enter with rails  Entrance Stairs - Number of Steps: 2    Functional Status:  Functional Status  Occupation: Full time employment  Type of Occupation: [employer]  Job Duties: Prolonged standing  IADL Comments: Pain with prolonged standing  Receives Help From: Family  ADL Assistance: Independent  Homemaking Assistance: Independent  Ambulation Assistance: Independent  Transfer Assistance: Independent  Active : Yes    Objective:     PROM RLE (degrees)  R Ankle Dorsiflexion (0-20): 10 +pain medial ankle  R Ankle Plantar Flexion (0-45): 40  R Ankle Forefoot Inversion (0-40): 35 +pain  R Ankle Forefoot Eversion (0-20): 20 +pain    Strength RLE  Comment: +pain pflex, inverion  R Ankle Dorsiflexion: 4+/5  R Ankle Plantar flexion: 4-/5  R Ankle Inversion: 3+/5  R Ankle Eversion: 4-/5     Additional Measures  Flexibility: Figure8 8 R 49 cm, L 48 cm  Special Tests: + pain with 2 leg toe raise.  Unable to R 1 leg toe raise  Other: Severe R flat arch, 2.5 cm navicular drop        Ambulation  Device: No Device  Assistance: Independent  Quality of Gait: Antalgic limp on R  Gait Deviations: Slow Lalita  Stairs/Curb  Stairs?: Yes  Stairs  Stairs Height: 4\"  Comment: Severe pain with descending     Assessment:    Conditions Requiring Skilled Therapeutic Intervention  Body Structures, Functions, Activity Limitations Requiring Skilled Therapeutic Intervention: Increased pain;Decreased strength  Therapy Prognosis: Good  Treatment Diagnosis: R ankle post tibialis tendinitis  Activity Tolerance  Activity Tolerance: Patient tolerated treatment well  Activity Tolerance: Patient tolerated treatment well         Plan:    Physcial Therapy Plan  Plan weeks: 4  Current Treatment Recommendations: Strengthening, ROM, Home exercise program, Manual, Modalities    OutComes Score:  LEFS Total Score: 43 (02/20/23 1700)         Goals:  Short Term Goals  Time Frame for Short Term Goals: 1 week  Short Term Goal 1: Start HEP  Long Term Goals  Time Frame for Long Term Goals : 4 weeks  Long Term Goal 1: R ankle pain controlled at 3/10 to allow regular activity  Long Term Goal 2: Able to step up/ down a 6\" step  Long Term Goal 3: No lost time from work due to increasing R ankle pain  Long Term Goal 4: LEFS improve to 72/80 for 10% disability or less       Therapy Time:   Individual Concurrent Group Co-treatment   Time In  4:30         Time Out  5:04         Minutes  34                 Nabor Mooney, PT

## 2023-02-20 NOTE — PROGRESS NOTES
Physical Therapy    Physical Therapy Daily Treatment Note    Date:  2023    Patient Name:  Hussain Miller    :  1960  MRN: [id number]  Restrictions/Precautions:     Medical/Treatment Diagnosis Information:   Diagnosis: M76.821 R tibial tendinitis , M72.2 R plantar faascitis  Treatment Diagnosis: R ankle post tibialis tendinitis  Insurance/Certification information:  PT Insurance Information: BCBS  Physician Information:   Rosa Maria Duran DPWILLIAN  Plan of care signed (Y/N):    Visit# / total visits:  1/10  Pain level: 8/10       Time In: 4:30  Time Out:5:04    Progress Note: [x]  Yes  []  No  Next due by: Visit #10 , or 3/22/23     Subjective:   See eval    Objective:See eval   Observation:   Test measurements:      Exercises:   Exercise/Equipment Resistance/Repetitions Other comments   US  Medial ankle adjacent to med mall   4-way t-band Yel 10x    Squat matrix     Step up  2\"    Rebock board- sit                                                   [x] Provided verbal/tactile cueing for activities related to strengthening, flexibility, endurance, ROM. (92500)  [] Provided verbal/tactile cueing for activities related to improving balance, coordination, kinesthetic sense, posture, motor skill, proprioception. (84279)    Therapeutic Activities:     [] Therapeutic activities, direct (one-on-one) patient contact (use of dynamic activities to improve functional performance). (34321)    Gait:   [] Provided training and instruction to the patient for ambulation re-education. (27798)    Self-Care/ADL's  [] Self-care/home management training and compensatory training, meal preparation, safety procedures, and instructions in use of assistive technology devices/adaptive equipment, direct one-on-one contact.  (12980)    Home Exercise Program: 4-way ankle t-band    [x] Reviewed/Progressed HEP activities related to strengthening, flexibility, endurance, ROM. (61273)  [] Reviewed/Progressed HEP activities related to improving balance, coordination, kinesthetic sense, posture, motor skill, proprioception.  (43775)    Manual Treatments:    [] Provided manual therapy to mobilize soft tissue/joints for the purpose of modulating pain, promoting relaxation,  increasing ROM, reducing/eliminating soft tissue swelling/inflammation/restriction, improving soft tissue extensibility. (30364)    Service Based Modalities:  Eval 34'    Timed Code Treatment Minutes:        Total Treatment Minutes:   29'    Treatment/Activity Tolerance:  [x] Patient tolerated treatment well [] Patient limited by fatique  [] Patient limited by pain  [] Patient limited by other medical complications  [] Other:     Prognosis: [x] Good [] Fair  [] Poor    Patient Requires Follow-up: [x] Yes  [] No      Goals:  Short Term Goals  Time Frame for Short Term Goals: 1 week  Short Term Goal 1: Start HEP    Long Term Goals  Time Frame for Long Term Goals : 4 weeks  Long Term Goal 1: R ankle pain controlled at 3/10 to allow regular activity  Long Term Goal 2: Able to step up/ down a 6\" step  Long Term Goal 3: No lost time from work due to increasing R ankle pain  Long Term Goal 4: LEFS improve to 72/80 for 10% disability or less          Plan:   [] Continue per plan of care [] Alter current plan (see comments)  [x] Plan of care initiated [] Hold pending MD visit [] Discharge  Plan for Next Session: US     Electronically signed by:  Denis Swan PT,PT

## 2023-02-20 NOTE — PLAN OF CARE
[facility]    [x] [place]  Phone: [phone]  Fax: [phone]      [] [place]  Phone: [phone]  Fax: [phone]        To:        Patient: Mala Powers  : 1960   MRN: [id number]  Evaluation Date: 2023      Diagnosis Information:  Diagnosis: M76.821 R tibial tendinitis , M72.2 R plantar faascitis   Treatment Diagnosis: R ankle post tibialis tendinitis     Physical Therapy Certification Form  Dear Connie Monreal DPM  The following patient has been evaluated for physical therapy services and for therapy to continue, Medicare requires monthly physician review of the treatment plan. Please review the attached evaluation and/or summary of the patient's plan of care, and verify that you agree therapy should continue by signing the attached document and sending it back to our office.     Plan of Care/Treatment to date:  [x] Therapeutic Exercise    [] Modalities:  [] Therapeutic Activity     [x] Ultrasound  [] Electrical Stimulation  [] Gait Training      [] Cervical Traction [] Lumbar Traction  [] Neuromuscular Re-education    [] Cold/hotpack [] Iontophoresis   [x] Instruction in HEP     Other:  [x] Manual Therapy      []             [] Aquatic Therapy      []                 Goals:  Short Term Goals  Time Frame for Short Term Goals: 1 week  Short Term Goal 1: Start HEP    Long Term Goals  Time Frame for Long Term Goals : 4 weeks  Long Term Goal 1: R ankle pain controlled at 3/10 to allow regular activity  Long Term Goal 2: Able to step up/ down a 6\" step  Long Term Goal 3: No lost time from work due to increasing R ankle pain  Long Term Goal 4: LEFS improve to 72/80 for 10% disability or less    Frequency/Duration:23 - 3/22/23  # Days per week: [] 1 day # Weeks: [] 1 week [] 5 weeks     [x] 2 days   [] 2 weeks [] 6 weeks     [] 3 days   [] 3 weeks [] 7 weeks     [] 4 days   [x] 4 weeks [] 8 weeks    Rehab Potential: [] Excellent [x] Good [] Fair  [] Poor     Electronically signed by:  Dc Peter PT      If you have any questions or concerns, please don't hesitate to call.   Thank you for your referral.      Physician Signature:________________________________Date:__________________  By signing above, therapists plan is approved by physician

## 2023-02-27 ENCOUNTER — HOSPITAL ENCOUNTER (OUTPATIENT)
Dept: PHYSICAL THERAPY | Age: 63
Setting detail: THERAPIES SERIES
Discharge: HOME | End: 2023-02-27
Payer: COMMERCIAL

## 2023-02-27 PROCEDURE — 97035 APP MDLTY 1+ULTRASOUND EA 15: CPT | Performed by: PHYSICAL THERAPIST

## 2023-02-27 PROCEDURE — 97110 THERAPEUTIC EXERCISES: CPT | Performed by: PHYSICAL THERAPIST

## 2023-02-27 NOTE — PROGRESS NOTES
Physical Therapy    Physical Therapy Daily Treatment Note    Date:  2023    Patient Name:  Girma Hilton    :  1960  MRN: [id number]  Restrictions/Precautions:     Medical/Treatment Diagnosis Information:   Diagnosis: M76.821 R tibial tendinitis , M72.2 R plantar faascitis  Treatment Diagnosis: R ankle post tibialis tendinitis  Insurance/Certification information:  PT Insurance Information: BCBS  Physician Information:   Wilber Sellers DPM  Plan of care signed (Y/N):    Visit# / total visits:  2/10  Pain level: 10       Time In:4:17  Time Out:4:41    Progress Note: []  Yes  [x]  No  Next due by: Visit #10 , or 3/22/23     Subjective: Inside of the ankle and foot is painful with standing    Objective:  Observation:   Severe flat arch and abnormal arch mechanics  Test measurements:    US to tibialis post tendon at medial malleolus to promote healing/ inflammation control    Exercises:   Exercise/Equipment Resistance/Repetitions Other comments   US 8' Medial ankle adjacent to med mall   4-way t-band Yel 10x    Squat matrix     Step up  2\"    Rebock board- sit 3'                                                  [x] Provided verbal/tactile cueing for activities related to strengthening, flexibility, endurance, ROM. (36292)  [] Provided verbal/tactile cueing for activities related to improving balance, coordination, kinesthetic sense, posture, motor skill, proprioception. (50458)    Therapeutic Activities:     [] Therapeutic activities, direct (one-on-one) patient contact (use of dynamic activities to improve functional performance). (79560)    Gait:   [] Provided training and instruction to the patient for ambulation re-education. (40029)    Self-Care/ADL's  [] Self-care/home management training and compensatory training, meal preparation, safety procedures, and instructions in use of assistive technology devices/adaptive equipment, direct one-on-one contact.  (62376)    Home Exercise Program: 4-way ankle t-band    [x] Reviewed/Progressed HEP activities related to strengthening, flexibility, endurance, ROM. (87376)  [] Reviewed/Progressed HEP activities related to improving balance, coordination, kinesthetic sense, posture, motor skill, proprioception.  (22632)    Manual Treatments:    [] Provided manual therapy to mobilize soft tissue/joints for the purpose of modulating pain, promoting relaxation,  increasing ROM, reducing/eliminating soft tissue swelling/inflammation/restriction, improving soft tissue extensibility.  (22530)    Service Based Modalities:      Timed Code Treatment Minutes:  US 8' 1.3 w/cm2, 50% for increased circulation                                                          There ex 15'     Total Treatment Minutes:   21''    Treatment/Activity Tolerance:  [x] Patient tolerated treatment well [] Patient limited by fatique  [] Patient limited by pain  [] Patient limited by other medical complications  [] Other:     Prognosis: [x] Good [] Fair  [] Poor    Patient Requires Follow-up: [x] Yes  [] No      Goals:  Short Term Goals  Time Frame for Short Term Goals: 1 week  Short Term Goal 1: Start HEP    Long Term Goals  Time Frame for Long Term Goals : 4 weeks  Long Term Goal 1: R ankle pain controlled at 3/10 to allow regular activity  Long Term Goal 2: Able to step up/ down a 6\" step  Long Term Goal 3: No lost time from work due to increasing R ankle pain  Long Term Goal 4: LEFS improve to 72/80 for 10% disability or less          Plan:   [] Continue per plan of care [] Alter current plan (see comments)  [] Plan of care initiated [] Hold pending MD visit [] Discharge  Plan for Next Session: US     Electronically signed by:  Aydee Moya PT,PT

## 2023-02-28 DIAGNOSIS — F43.9 STRESS: ICD-10-CM

## 2023-02-28 RX ORDER — ESCITALOPRAM OXALATE 5 MG/1
5 TABLET ORAL DAILY
Qty: 30 TABLET | Refills: 0 | Status: SHIPPED | OUTPATIENT
Start: 2023-02-28

## 2023-03-01 ENCOUNTER — HOSPITAL ENCOUNTER (OUTPATIENT)
Dept: PHYSICAL THERAPY | Age: 63
Setting detail: THERAPIES SERIES
Discharge: HOME | End: 2023-03-01
Payer: COMMERCIAL

## 2023-03-01 PROCEDURE — 97110 THERAPEUTIC EXERCISES: CPT | Performed by: PHYSICAL THERAPIST

## 2023-03-01 PROCEDURE — 97035 APP MDLTY 1+ULTRASOUND EA 15: CPT | Performed by: PHYSICAL THERAPIST

## 2023-03-01 NOTE — PROGRESS NOTES
Physical Therapy    Physical Therapy Daily Treatment Note    Date:  3/1/2023    Patient Name:  Nohemy Chavez    :  1960  MRN: [id number]  Restrictions/Precautions:     Medical/Treatment Diagnosis Information:   Diagnosis: M76.821 R tibial tendinitis , M72.2 R plantar faascitis  Treatment Diagnosis: R ankle post tibialis tendinitis  Insurance/Certification information:  PT Insurance Information: BCBS  Physician Information:   Garry Arts DPM  Plan of care signed (Y/N):    Visit# / total visits:  3/10  Pain level: 8/10       Time In:3:55  Time Out:4:15    Progress Note: []  Yes  [x]  No  Next due by: Visit #10 , or 3/22/23     Subjective: The ankle felt a little better for a cpuple hours last time    Objective:  Observation:   Severe flat arch and abnormal arch mechanics  Work shift requires almost constant standing  Test measurements:    US to tibialis post tendon at medial malleolus to promote healing/ inflammation control    Exercises:   Exercise/Equipment Resistance/Repetitions Other comments   US 8' Medial ankle adjacent to med mall   4-way t-band Yel 10x    Squat matrix     Step up  2\"    Rebock board- sit 3'                                                  [x] Provided verbal/tactile cueing for activities related to strengthening, flexibility, endurance, ROM. (39932)  [] Provided verbal/tactile cueing for activities related to improving balance, coordination, kinesthetic sense, posture, motor skill, proprioception. (60988)    Therapeutic Activities:     [] Therapeutic activities, direct (one-on-one) patient contact (use of dynamic activities to improve functional performance). (71339)    Gait:   [] Provided training and instruction to the patient for ambulation re-education.  (77319)    Self-Care/ADL's  [] Self-care/home management training and compensatory training, meal preparation, safety procedures, and instructions in use of assistive technology devices/adaptive equipment, direct one-on-one contact. (59543)    Home Exercise Program: 4-way ankle t-band    [x] Reviewed/Progressed HEP activities related to strengthening, flexibility, endurance, ROM. (17008)  [] Reviewed/Progressed HEP activities related to improving balance, coordination, kinesthetic sense, posture, motor skill, proprioception.  (08631)    Manual Treatments:    [] Provided manual therapy to mobilize soft tissue/joints for the purpose of modulating pain, promoting relaxation,  increasing ROM, reducing/eliminating soft tissue swelling/inflammation/restriction, improving soft tissue extensibility.  (70277)    Service Based Modalities:      Timed Code Treatment Minutes:  US 8' 1.3 w/cm2, 50% for increased circulation                                                          There ex 10'     Total Treatment Minutes:   25'    Treatment/Activity Tolerance:  [x] Patient tolerated treatment well [] Patient limited by fatique  [] Patient limited by pain  [] Patient limited by other medical complications  [] Other:     Prognosis: [x] Good [] Fair  [] Poor    Patient Requires Follow-up: [x] Yes  [] No      Goals:  Short Term Goals  Time Frame for Short Term Goals: 1 week  Short Term Goal 1: Start HEP    Long Term Goals  Time Frame for Long Term Goals : 4 weeks  Long Term Goal 1: R ankle pain controlled at 3/10 to allow regular activity  Long Term Goal 2: Able to step up/ down a 6\" step  Long Term Goal 3: No lost time from work due to increasing R ankle pain  Long Term Goal 4: LEFS improve to 72/80 for 10% disability or less          Plan:   [] Continue per plan of care [] Alter current plan (see comments)  [] Plan of care initiated [] Hold pending MD visit [] Discharge  Plan for Next Session: US     Electronically signed by:  Rosanna Bryant PT,PT

## 2023-03-07 ENCOUNTER — APPOINTMENT (OUTPATIENT)
Dept: PHYSICAL THERAPY | Age: 63
End: 2023-03-07
Payer: COMMERCIAL

## 2023-03-09 ENCOUNTER — HOSPITAL ENCOUNTER (OUTPATIENT)
Dept: PHYSICAL THERAPY | Age: 63
Setting detail: THERAPIES SERIES
Discharge: HOME | End: 2023-03-09
Payer: COMMERCIAL

## 2023-03-09 PROCEDURE — 97035 APP MDLTY 1+ULTRASOUND EA 15: CPT | Performed by: PHYSICAL THERAPIST

## 2023-03-09 NOTE — PROGRESS NOTES
Physical Therapy    Physical Therapy Daily Treatment Note    Date:  3/9/2023    Patient Name:  Mac Mohan    :  1960  MRN: [id number]  Restrictions/Precautions:     Medical/Treatment Diagnosis Information:   Diagnosis: M76.821 R tibial tendinitis , M72.2 R plantar faascitis  Treatment Diagnosis: R ankle post tibialis tendinitis  Insurance/Certification information:  PT Insurance Information: BCBS  Physician Information:   Zaira Fleming DPWILLIAN  Plan of care signed (Y/N):    Visit# / total visits:  4/10  Pain level: 8/10       Time In:4:29  Time Out:4:44    Progress Note: []  Yes  [x]  No  Next due by: Visit #10 , or 3/22/23     Subjective: With time the ankle/ foot is getting worse. Less tolerance to all of her work at [employer]. Very hard to do any overtime    Objective:  Observation:   Severe flat arch and abnormal arch mechanics  Work shift requires almost constant standing  Test measurements:    US to tibialis post tendon at medial malleolus to promote healing/ inflammation control    Exercises:   Exercise/Equipment Resistance/Repetitions Other comments   US 8' Medial ankle adjacent to med mall   4-way t-band Yel 10x    Squat matrix     Step up      Rebock board- sit 3'                                                  [x] Provided verbal/tactile cueing for activities related to strengthening, flexibility, endurance, ROM. (46817)  [] Provided verbal/tactile cueing for activities related to improving balance, coordination, kinesthetic sense, posture, motor skill, proprioception. (19118)    Therapeutic Activities:     [] Therapeutic activities, direct (one-on-one) patient contact (use of dynamic activities to improve functional performance). (77223)    Gait:   [] Provided training and instruction to the patient for ambulation re-education.  (01277)    Self-Care/ADL's  [] Self-care/home management training and compensatory training, meal preparation, safety procedures, and instructions in use of assistive technology devices/adaptive equipment, direct one-on-one contact. (79115)    Home Exercise Program: 4-way ankle t-band    [x] Reviewed/Progressed HEP activities related to strengthening, flexibility, endurance, ROM. (11599)  [] Reviewed/Progressed HEP activities related to improving balance, coordination, kinesthetic sense, posture, motor skill, proprioception.  (14882)    Manual Treatments:    [] Provided manual therapy to mobilize soft tissue/joints for the purpose of modulating pain, promoting relaxation,  increasing ROM, reducing/eliminating soft tissue swelling/inflammation/restriction, improving soft tissue extensibility.  (53841)    Service Based Modalities:      Timed Code Treatment Minutes:  US 8' 1.3 w/cm2, 50% for increased circulation                                                              Total Treatment Minutes:   8'    Treatment/Activity Tolerance:  [x] Patient tolerated treatment well [] Patient limited by fatique  [] Patient limited by pain  [] Patient limited by other medical complications  [] Other:     Prognosis: [x] Good [] Fair  [] Poor    Patient Requires Follow-up: [x] Yes  [] No      Goals:  Short Term Goals  Time Frame for Short Term Goals: 1 week  Short Term Goal 1: Start HEP    Long Term Goals  Time Frame for Long Term Goals : 4 weeks  Long Term Goal 1: R ankle pain controlled at 3/10 to allow regular activity  Long Term Goal 2: Able to step up/ down a 6\" step  Long Term Goal 3: No lost time from work due to increasing R ankle pain  Long Term Goal 4: LEFS improve to 72/80 for 10% disability or less          Plan:   [] Continue per plan of care [] Alter current plan (see comments)  [] Plan of care initiated [] Hold pending MD visit [] Discharge  Plan for Next Session: US     Electronically signed by:  Sd Wilson PT,PT

## 2023-03-14 ENCOUNTER — OFFICE VISIT (OUTPATIENT)
Dept: FAMILY MEDICINE CLINIC | Age: 63
End: 2023-03-14
Payer: COMMERCIAL

## 2023-03-14 ENCOUNTER — APPOINTMENT (OUTPATIENT)
Dept: PHYSICAL THERAPY | Age: 63
End: 2023-03-14
Payer: COMMERCIAL

## 2023-03-14 VITALS
DIASTOLIC BLOOD PRESSURE: 68 MMHG | OXYGEN SATURATION: 98 % | HEIGHT: 64 IN | WEIGHT: 176 LBS | SYSTOLIC BLOOD PRESSURE: 126 MMHG | HEART RATE: 69 BPM | BODY MASS INDEX: 30.05 KG/M2

## 2023-03-14 DIAGNOSIS — F43.21 GRIEF REACTION: Primary | ICD-10-CM

## 2023-03-14 DIAGNOSIS — F51.01 PRIMARY INSOMNIA: ICD-10-CM

## 2023-03-14 PROCEDURE — 99213 OFFICE O/P EST LOW 20 MIN: CPT | Performed by: PHYSICIAN ASSISTANT

## 2023-03-14 RX ORDER — MAGNESIUM OXIDE 400 MG/1
400 TABLET ORAL DAILY
COMMUNITY

## 2023-03-14 RX ORDER — ESCITALOPRAM OXALATE 10 MG/1
10 TABLET ORAL DAILY
Qty: 30 TABLET | Refills: 2 | Status: SHIPPED | OUTPATIENT
Start: 2023-03-14

## 2023-03-14 RX ORDER — FAMOTIDINE 40 MG/1
40 TABLET, FILM COATED ORAL DAILY
Qty: 90 TABLET | Refills: 1 | Status: SHIPPED | OUTPATIENT
Start: 2023-03-14

## 2023-03-14 RX ORDER — TRAZODONE HYDROCHLORIDE 50 MG/1
50 TABLET ORAL NIGHTLY PRN
Qty: 30 TABLET | Refills: 0 | Status: SHIPPED | OUTPATIENT
Start: 2023-03-14

## 2023-03-14 SDOH — ECONOMIC STABILITY: HOUSING INSECURITY
IN THE LAST 12 MONTHS, WAS THERE A TIME WHEN YOU DID NOT HAVE A STEADY PLACE TO SLEEP OR SLEPT IN A SHELTER (INCLUDING NOW)?: PATIENT REFUSED

## 2023-03-14 SDOH — ECONOMIC STABILITY: INCOME INSECURITY: HOW HARD IS IT FOR YOU TO PAY FOR THE VERY BASICS LIKE FOOD, HOUSING, MEDICAL CARE, AND HEATING?: PATIENT DECLINED

## 2023-03-14 SDOH — ECONOMIC STABILITY: FOOD INSECURITY: WITHIN THE PAST 12 MONTHS, THE FOOD YOU BOUGHT JUST DIDN'T LAST AND YOU DIDN'T HAVE MONEY TO GET MORE.: PATIENT DECLINED

## 2023-03-14 SDOH — ECONOMIC STABILITY: FOOD INSECURITY: WITHIN THE PAST 12 MONTHS, YOU WORRIED THAT YOUR FOOD WOULD RUN OUT BEFORE YOU GOT MONEY TO BUY MORE.: PATIENT DECLINED

## 2023-03-14 ASSESSMENT — PATIENT HEALTH QUESTIONNAIRE - PHQ9
10. IF YOU CHECKED OFF ANY PROBLEMS, HOW DIFFICULT HAVE THESE PROBLEMS MADE IT FOR YOU TO DO YOUR WORK, TAKE CARE OF THINGS AT HOME, OR GET ALONG WITH OTHER PEOPLE: 0
9. THOUGHTS THAT YOU WOULD BE BETTER OFF DEAD, OR OF HURTING YOURSELF: 0
4. FEELING TIRED OR HAVING LITTLE ENERGY: 1
SUM OF ALL RESPONSES TO PHQ QUESTIONS 1-9: 8
SUM OF ALL RESPONSES TO PHQ9 QUESTIONS 1 & 2: 4
8. MOVING OR SPEAKING SO SLOWLY THAT OTHER PEOPLE COULD HAVE NOTICED. OR THE OPPOSITE, BEING SO FIGETY OR RESTLESS THAT YOU HAVE BEEN MOVING AROUND A LOT MORE THAN USUAL: 0
5. POOR APPETITE OR OVEREATING: 1
6. FEELING BAD ABOUT YOURSELF - OR THAT YOU ARE A FAILURE OR HAVE LET YOURSELF OR YOUR FAMILY DOWN: 0
3. TROUBLE FALLING OR STAYING ASLEEP: 1
7. TROUBLE CONCENTRATING ON THINGS, SUCH AS READING THE NEWSPAPER OR WATCHING TELEVISION: 1
2. FEELING DOWN, DEPRESSED OR HOPELESS: 2
SUM OF ALL RESPONSES TO PHQ QUESTIONS 1-9: 8
1. LITTLE INTEREST OR PLEASURE IN DOING THINGS: 2

## 2023-03-14 ASSESSMENT — ENCOUNTER SYMPTOMS: RESPIRATORY NEGATIVE: 1

## 2023-03-15 NOTE — PROGRESS NOTES
Subjective:      Patient ID: Adonay Joe is a 63 y.o. female. Mental Health Problem  The primary symptoms include dysphoric mood. The current episode started more than 1 month ago. Her change in mood was precipitated by the death of a loved one. The onset of the illness is precipitated by emotional stress and a stressful event. The degree of incapacity that she is experiencing as a consequence of her illness is moderate. Additional symptoms of the illness include anhedonia, insomnia, agitation and distractible. Additional symptoms of the illness do not include feelings of worthlessness, attention impairment or decreased need for sleep. She does not admit to suicidal ideas. Review of Systems   Constitutional: Negative. HENT: Negative. Respiratory: Negative. Psychiatric/Behavioral:  Positive for agitation and dysphoric mood. The patient has insomnia. Objective:   Physical Exam  HENT:      Head: Normocephalic. Right Ear: Tympanic membrane normal.      Left Ear: Tympanic membrane normal.   Eyes:      Pupils: Pupils are equal, round, and reactive to light. Cardiovascular:      Rate and Rhythm: Normal rate. Pulmonary:      Effort: Pulmonary effort is normal.      Breath sounds: Normal breath sounds. Musculoskeletal:      Cervical back: Neck supple. Skin:     General: Skin is warm. Neurological:      General: No focal deficit present. Mental Status: She is alert and oriented to person, place, and time. Psychiatric:         Mood and Affect: Mood is depressed. Affect is tearful. Speech: Speech normal.       Assessment:      1. Grief reaction    2. Primary insomnia          Plan:      Increase Lexapro 10 mg daily. Coping strategies discussed. Trazodone 50 mg QHS. Discussed grief counseling in detail today. Follow-up in three months/sooner PRN.         RUSSELL Drew

## 2023-03-21 ENCOUNTER — APPOINTMENT (OUTPATIENT)
Dept: PHYSICAL THERAPY | Age: 63
End: 2023-03-21
Payer: COMMERCIAL

## 2023-03-23 ENCOUNTER — APPOINTMENT (OUTPATIENT)
Dept: PHYSICAL THERAPY | Age: 63
End: 2023-03-23
Payer: COMMERCIAL

## 2023-04-04 ENCOUNTER — HOSPITAL ENCOUNTER (OUTPATIENT)
Dept: MRI IMAGING | Age: 63
Discharge: HOME | End: 2023-04-06
Payer: COMMERCIAL

## 2023-04-04 DIAGNOSIS — M72.2 PLANTAR FASCIITIS, RIGHT: ICD-10-CM

## 2023-04-04 DIAGNOSIS — M76.821 POSTERIOR TIBIAL TENDONITIS, RIGHT: ICD-10-CM

## 2023-04-04 PROCEDURE — 73718 MRI LOWER EXTREMITY W/O DYE: CPT

## 2023-04-05 ENCOUNTER — TELEPHONE (OUTPATIENT)
Dept: FAMILY MEDICINE CLINIC | Age: 63
End: 2023-04-05

## 2023-04-05 NOTE — TELEPHONE ENCOUNTER
Pt calling stating her Tim Duet is now costing her $60 and that's too expensive and pt questions if you could prescribe something cheaper, pt  only has 2 pills left, pt uses [facility] for short term meds, please advise.

## 2023-04-18 DIAGNOSIS — N32.81 OVERACTIVE BLADDER: ICD-10-CM

## 2023-04-18 RX ORDER — SOLIFENACIN SUCCINATE 5 MG/1
5 TABLET, FILM COATED ORAL DAILY
Qty: 30 TABLET | Refills: 0 | Status: SHIPPED | OUTPATIENT
Start: 2023-04-18 | End: 2023-04-28

## 2023-04-18 NOTE — TELEPHONE ENCOUNTER
[facility] called requesting a refill of the below medication which has been pended for you:     Requested Prescriptions     Pending Prescriptions Disp Refills    solifenacin (VESICARE) 5 MG tablet 30 tablet 0     Sig: Take 1 tablet by mouth daily for 10 days       Last Appointment Date: 3/14/2023  Next Appointment Date: 6/14/2023    Allergies   Allergen Reactions    Tape Maxim Cox] Rash

## 2023-04-18 NOTE — TELEPHONE ENCOUNTER
states pt had ordered this through their mail order but it has not come and pt is completely out, can you send to pended pharmacy, no call back needed.

## 2023-04-19 ENCOUNTER — TELEPHONE (OUTPATIENT)
Dept: FAMILY MEDICINE CLINIC | Age: 63
End: 2023-04-19

## 2023-04-19 ENCOUNTER — APPOINTMENT (OUTPATIENT)
Dept: GENERAL RADIOLOGY | Age: 63
End: 2023-04-19
Payer: COMMERCIAL

## 2023-04-19 ENCOUNTER — HOSPITAL ENCOUNTER (EMERGENCY)
Age: 63
Discharge: HOME | End: 2023-04-19
Attending: EMERGENCY MEDICINE
Payer: COMMERCIAL

## 2023-04-19 ENCOUNTER — OFFICE VISIT (OUTPATIENT)
Dept: FAMILY MEDICINE CLINIC | Age: 63
End: 2023-04-19

## 2023-04-19 VITALS
OXYGEN SATURATION: 96 % | SYSTOLIC BLOOD PRESSURE: 118 MMHG | DIASTOLIC BLOOD PRESSURE: 70 MMHG | TEMPERATURE: 98.4 F | RESPIRATION RATE: 14 BRPM | BODY MASS INDEX: 29.02 KG/M2 | HEART RATE: 62 BPM | HEIGHT: 64 IN | WEIGHT: 170 LBS

## 2023-04-19 VITALS
WEIGHT: 175 LBS | HEART RATE: 69 BPM | OXYGEN SATURATION: 98 % | DIASTOLIC BLOOD PRESSURE: 80 MMHG | SYSTOLIC BLOOD PRESSURE: 128 MMHG | BODY MASS INDEX: 29.88 KG/M2 | HEIGHT: 64 IN

## 2023-04-19 DIAGNOSIS — R55 NEAR SYNCOPE: Primary | ICD-10-CM

## 2023-04-19 DIAGNOSIS — R42 DIZZINESS: Primary | ICD-10-CM

## 2023-04-19 DIAGNOSIS — R07.9 CHEST PAIN, UNSPECIFIED TYPE: ICD-10-CM

## 2023-04-19 LAB
ABSOLUTE EOS #: 0.25 K/UL (ref 0–0.44)
ABSOLUTE IMMATURE GRANULOCYTE: 0.04 K/UL (ref 0–0.3)
ABSOLUTE LYMPH #: 4.02 K/UL (ref 1.1–3.7)
ABSOLUTE MONO #: 0.78 K/UL (ref 0.1–1.2)
ANION GAP SERPL CALCULATED.3IONS-SCNC: 8 MMOL/L (ref 9–17)
BASOPHILS # BLD: 1 % (ref 0–2)
BASOPHILS ABSOLUTE: 0.07 K/UL (ref 0–0.2)
BUN SERPL-MCNC: 22 MG/DL (ref 8–23)
BUN/CREAT BLD: 38 (ref 9–20)
CALCIUM SERPL-MCNC: 8.8 MG/DL (ref 8.6–10.4)
CHLORIDE SERPL-SCNC: 101 MMOL/L (ref 98–107)
CO2 SERPL-SCNC: 27 MMOL/L (ref 20–31)
CREAT SERPL-MCNC: 0.58 MG/DL (ref 0.5–0.9)
D DIMER BLD IA.RAPID-MCNC: <0.27 UG/ML FEU (ref 0–0.59)
EKG ATRIAL RATE: 59 BPM
EKG ATRIAL RATE: 59 BPM
EKG P AXIS: 42 DEGREES
EKG P AXIS: 51 DEGREES
EKG P-R INTERVAL: 138 MS
EKG P-R INTERVAL: 142 MS
EKG Q-T INTERVAL: 416 MS
EKG Q-T INTERVAL: 424 MS
EKG QRS DURATION: 74 MS
EKG QRS DURATION: 74 MS
EKG QTC CALCULATION (BAZETT): 411 MS
EKG QTC CALCULATION (BAZETT): 419 MS
EKG R AXIS: 28 DEGREES
EKG R AXIS: 3 DEGREES
EKG T AXIS: 29 DEGREES
EKG T AXIS: 51 DEGREES
EKG VENTRICULAR RATE: 59 BPM
EKG VENTRICULAR RATE: 59 BPM
EOSINOPHILS RELATIVE PERCENT: 2 % (ref 1–4)
GFR SERPL CREATININE-BSD FRML MDRD: >60 ML/MIN/1.73M2
GLUCOSE SERPL-MCNC: 91 MG/DL (ref 70–99)
HCT VFR BLD AUTO: 39.8 % (ref 36.3–47.1)
HGB BLD-MCNC: 13.5 G/DL (ref 11.9–15.1)
IMMATURE GRANULOCYTES: 0 %
LYMPHOCYTES # BLD: 34 % (ref 24–43)
MCH RBC QN AUTO: 30.5 PG (ref 25.2–33.5)
MCHC RBC AUTO-ENTMCNC: 33.9 G/DL (ref 25.2–33.5)
MCV RBC AUTO: 90 FL (ref 82.6–102.9)
MONOCYTES # BLD: 7 % (ref 3–12)
NRBC AUTOMATED: 0 PER 100 WBC
PDW BLD-RTO: 13.4 % (ref 11.8–14.4)
PLATELET # BLD AUTO: 292 K/UL (ref 138–453)
PMV BLD AUTO: 8.9 FL (ref 8.1–13.5)
POTASSIUM SERPL-SCNC: 4 MMOL/L (ref 3.7–5.3)
RBC # BLD: 4.42 M/UL (ref 3.95–5.11)
SEG NEUTROPHILS: 56 % (ref 36–65)
SEGMENTED NEUTROPHILS ABSOLUTE COUNT: 6.61 K/UL (ref 1.5–8.1)
SODIUM SERPL-SCNC: 136 MMOL/L (ref 135–144)
TROPONIN I SERPL DL<=0.01 NG/ML-MCNC: <6 NG/L (ref 0–14)
WBC # BLD AUTO: 11.8 K/UL (ref 3.5–11.3)

## 2023-04-19 PROCEDURE — 85025 COMPLETE CBC W/AUTO DIFF WBC: CPT

## 2023-04-19 PROCEDURE — 93005 ELECTROCARDIOGRAM TRACING: CPT | Performed by: EMERGENCY MEDICINE

## 2023-04-19 PROCEDURE — 71045 X-RAY EXAM CHEST 1 VIEW: CPT

## 2023-04-19 PROCEDURE — 99999 PR OFFICE/OUTPT VISIT,PROCEDURE ONLY: CPT | Performed by: PHYSICIAN ASSISTANT

## 2023-04-19 PROCEDURE — 80048 BASIC METABOLIC PNL TOTAL CA: CPT

## 2023-04-19 PROCEDURE — 84484 ASSAY OF TROPONIN QUANT: CPT

## 2023-04-19 PROCEDURE — 99285 EMERGENCY DEPT VISIT HI MDM: CPT

## 2023-04-19 PROCEDURE — 85379 FIBRIN DEGRADATION QUANT: CPT

## 2023-04-19 ASSESSMENT — PAIN DESCRIPTION - DESCRIPTORS: DESCRIPTORS: PRESSURE

## 2023-04-19 ASSESSMENT — PAIN DESCRIPTION - LOCATION: LOCATION: CHEST

## 2023-04-19 ASSESSMENT — PAIN SCALES - GENERAL: PAINLEVEL_OUTOF10: 4

## 2023-04-19 ASSESSMENT — PAIN DESCRIPTION - PAIN TYPE: TYPE: ACUTE PAIN

## 2023-04-19 ASSESSMENT — PAIN - FUNCTIONAL ASSESSMENT
PAIN_FUNCTIONAL_ASSESSMENT: NONE - DENIES PAIN
PAIN_FUNCTIONAL_ASSESSMENT: NONE - DENIES PAIN

## 2023-04-19 ASSESSMENT — PAIN DESCRIPTION - ORIENTATION: ORIENTATION: MID

## 2023-04-19 ASSESSMENT — PAIN DESCRIPTION - ONSET: ONSET: SUDDEN

## 2023-04-19 NOTE — DISCHARGE INSTRUCTIONS
See the cardiologist to follow-up about your blood pressure and chest discomfort. See your doctor to follow-up as well. Return for worsening pain, feeling faint, or if worse in any way. PLEASE RETURN TO THE EMERGENCY DEPARTMENT IMMEDIATELY if your symptoms worsen in anyway or in 8-12 hours if not improved for re-evaluation. You should immediately return to the ER for symptoms such as increasing pain, shortness of breath, fever, a feeling of passing out, light headed, dizziness, abdominal pain, numbness or weakness to the arms or legs, coolness or color change of the arms or legs. Take your medication as indicated and prescribed. If you are given an antibiotic then, make sure you get the prescription filled and take the antibiotics until finished. Please understand that at this time there is no evidence for a more serious underlying process, but that early in the process of an illness or injury, an emergency department workup can be falsely reassuring. You should contact your family doctor within the next 24 hours for a follow up appointment    Mauri Singh!!!    From [facility]) and [facility]    On behalf of the Emergency Department staff at [facility] I would like to thank you for giving us the opportunity to address your health care needs and concerns. We hope that during your visit, our service was delivered in a professional and caring manner. Please keep [facility]) in mind as we walk with you down the path to your own personal wellness. Please expect an automated text message or email from us so we can ask a few questions about your health and progress. Based on your answers, a clinician may call you back to offer help and instructions. Please understand that early in the process of an illness or injury, an emergency department workup can be falsely reassuring.   If you notice any worsening, changing or persistent symptoms please call your family doctor or return

## 2023-04-19 NOTE — TELEPHONE ENCOUNTER
Pt was seen in the er today and was wondering when you would like to see her back for an office visit. The er recommended to see her in 2 days and pt will be returning to work on Monday unless Lashell Burr thinks its not a good idea to return to work yet. Pt would like a call back to let her know when to schedule future appointment.

## 2023-04-19 NOTE — PROGRESS NOTES
Patient with c/o dizziness and lightheadedness at work today. 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 Patient continues to complain of fatigue and generally not feeling well. Patient has past h/o CVA and family h/o CAD. To ER for evaluation. Report called to [facility].

## 2023-04-21 ENCOUNTER — OFFICE VISIT (OUTPATIENT)
Dept: FAMILY MEDICINE CLINIC | Age: 63
End: 2023-04-21
Payer: COMMERCIAL

## 2023-04-21 VITALS
DIASTOLIC BLOOD PRESSURE: 70 MMHG | HEIGHT: 64 IN | HEART RATE: 80 BPM | BODY MASS INDEX: 29.6 KG/M2 | SYSTOLIC BLOOD PRESSURE: 122 MMHG | WEIGHT: 173.4 LBS

## 2023-04-21 DIAGNOSIS — R00.2 PALPITATIONS: ICD-10-CM

## 2023-04-21 DIAGNOSIS — Z86.73 HX-TIA (TRANSIENT ISCHEMIC ATTACK): ICD-10-CM

## 2023-04-21 DIAGNOSIS — R55 NEAR SYNCOPE: Primary | ICD-10-CM

## 2023-04-21 PROCEDURE — 99214 OFFICE O/P EST MOD 30 MIN: CPT | Performed by: PHYSICIAN ASSISTANT

## 2023-04-21 NOTE — PROGRESS NOTES
Near to/above Desirable   130-159   Borderline      >159   Undesirable     Direct (measured) LDL and calculated LDL are not interchangeable tests. Chol/HDL Ratio 04/15/2023 2.4  <5 Final            Triglycerides 04/15/2023 67  <150 mg/dL Final    Comment:    Triglyceride Guidelines:     <150   Desirable   150-199  Borderline   200-499  High     >499   Very high   Based on AHA Guidelines for fasting triglyceride, October 2012. Glucose 04/15/2023 91  70 - 99 mg/dL Final    BUN 04/15/2023 23  8 - 23 mg/dL Final    Creatinine 04/15/2023 0.68  0.50 - 0.90 mg/dL Final    Est, Glom Filt Rate 04/15/2023 >60  >60 mL/min/1.73m2 Final    Comment:       These results are not intended for use in patients <25years of age. eGFR results are calculated without a race factor using the 2021 CKD-EPI equation. Careful clinical correlation is recommended, particularly when comparing to results   calculated using previous equations. The CKD-EPI equation is less accurate in patients with extremes of muscle mass, extra-renal   metabolism of creatine, excessive creatine ingestion, or following therapy that affects   renal tubular secretion. Bun/Cre Ratio 04/15/2023 34 (H)  9 - 20 Final    Calcium 04/15/2023 8.9  8.6 - 10.4 mg/dL Final    Sodium 04/15/2023 142  135 - 144 mmol/L Final    Potassium 04/15/2023 4.1  3.7 - 5.3 mmol/L Final    Chloride 04/15/2023 106  98 - 107 mmol/L Final    CO2 04/15/2023 28  20 - 31 mmol/L Final    Anion Gap 04/15/2023 8 (L)  9 - 17 mmol/L Final    Alkaline Phosphatase 04/15/2023 51  35 - 104 U/L Final    ALT 04/15/2023 15  5 - 33 U/L Final    AST 04/15/2023 12  <32 U/L Final    Total Bilirubin 04/15/2023 0.2 (L)  0.3 - 1.2 mg/dL Final    Total Protein 04/15/2023 6.2 (L)  6.4 - 8.3 g/dL Final    Albumin 04/15/2023 4.1  3.5 - 5.2 g/dL Final    Albumin/Globulin Ratio 04/15/2023 2.0  1.0 - 2.5 Final        FINAL DIAGNOSIS AND ORDERS   Diagnosis Orders   1.  Near syncope  Holter Monitor 48

## 2023-05-01 ENCOUNTER — HOSPITAL ENCOUNTER (OUTPATIENT)
Dept: MRI IMAGING | Age: 63
Discharge: HOME | End: 2023-05-03
Payer: COMMERCIAL

## 2023-05-01 DIAGNOSIS — R55 NEAR SYNCOPE: ICD-10-CM

## 2023-05-01 DIAGNOSIS — R00.2 PALPITATIONS: ICD-10-CM

## 2023-05-01 DIAGNOSIS — Z86.73 HX-TIA (TRANSIENT ISCHEMIC ATTACK): ICD-10-CM

## 2023-05-01 PROCEDURE — 70551 MRI BRAIN STEM W/O DYE: CPT

## 2023-05-02 ENCOUNTER — OFFICE VISIT (OUTPATIENT)
Dept: NEUROSURGERY | Age: 63
End: 2023-05-02
Payer: COMMERCIAL

## 2023-05-02 VITALS
BODY MASS INDEX: 29.7 KG/M2 | HEART RATE: 72 BPM | RESPIRATION RATE: 20 BRPM | OXYGEN SATURATION: 98 % | TEMPERATURE: 98 F | DIASTOLIC BLOOD PRESSURE: 82 MMHG | SYSTOLIC BLOOD PRESSURE: 128 MMHG | WEIGHT: 173 LBS

## 2023-05-02 DIAGNOSIS — R55 NEAR SYNCOPE: Primary | ICD-10-CM

## 2023-05-02 DIAGNOSIS — G56.02 LEFT CARPAL TUNNEL SYNDROME: ICD-10-CM

## 2023-05-02 DIAGNOSIS — M47.812 CERVICAL SPONDYLOSIS: ICD-10-CM

## 2023-05-02 PROCEDURE — 99214 OFFICE O/P EST MOD 30 MIN: CPT | Performed by: NURSE PRACTITIONER

## 2023-05-02 NOTE — PROGRESS NOTES
abnormality. BONES/SOFT TISSUES: The bone marrow signal intensity appears normal. The soft  tissues demonstrate no acute abnormality. Emergency department notes reviewed  PCP notes reviewed    Assessment and Plan:      1. Near syncope    2. Left carpal tunnel syndrome    3. Cervical spondylosis          Plan: Agree with recommendations for cardiology and neurology follow-up regarding recent near syncopal episode. In regards to cervical spondylosis, median/ulnar neuropathy, patient is not interested in pursuing any type of surgical intervention at this time. Recommend continue nocturnal bracing as long as this manages symptoms. Advised to closely monitor for any new numbness, tingling, weakness. Can follow-up as needed at any time should she desire any additional surgical evaluation. Followup: Return if symptoms worsen or fail to improve. Prescriptions Ordered:  No orders of the defined types were placed in this encounter. Orders Placed:  No orders of the defined types were placed in this encounter. Electronically signed by STEF Lobato CNP on 5/2/2023 at 4:23 PM    Please note that this chart was generated using voice recognition Dragon dictation software. Although every effort was made to ensure the accuracy of this automated transcription, some errors in transcription may have occurred.

## 2023-05-03 ENCOUNTER — TELEPHONE (OUTPATIENT)
Dept: FAMILY MEDICINE CLINIC | Age: 63
End: 2023-05-03

## 2023-05-03 NOTE — TELEPHONE ENCOUNTER
----- Message from [place], [place] sent at 5/2/2023  7:22 PM EDT -----  No acute change. Mild chronic small vessel disease.

## 2023-05-15 ENCOUNTER — HOSPITAL ENCOUNTER (OUTPATIENT)
Dept: NON INVASIVE DIAGNOSTICS | Age: 63
Discharge: HOME | End: 2023-05-15
Payer: COMMERCIAL

## 2023-05-15 DIAGNOSIS — R00.2 PALPITATIONS: ICD-10-CM

## 2023-05-15 DIAGNOSIS — R55 NEAR SYNCOPE: ICD-10-CM

## 2023-05-15 PROCEDURE — 93225 XTRNL ECG REC<48 HRS REC: CPT

## 2023-05-15 PROCEDURE — 93226 XTRNL ECG REC<48 HR SCAN A/R: CPT

## 2023-05-17 ENCOUNTER — HOSPITAL ENCOUNTER (OUTPATIENT)
Dept: NON INVASIVE DIAGNOSTICS | Age: 63
Discharge: HOME | End: 2023-05-17

## 2023-05-18 ENCOUNTER — TELEPHONE (OUTPATIENT)
Dept: FAMILY MEDICINE CLINIC | Age: 63
End: 2023-05-18

## 2023-05-18 ENCOUNTER — OFFICE VISIT (OUTPATIENT)
Dept: CARDIOLOGY | Age: 63
End: 2023-05-18
Payer: COMMERCIAL

## 2023-05-18 VITALS
DIASTOLIC BLOOD PRESSURE: 62 MMHG | HEIGHT: 64 IN | SYSTOLIC BLOOD PRESSURE: 112 MMHG | OXYGEN SATURATION: 98 % | HEART RATE: 69 BPM | WEIGHT: 177 LBS | BODY MASS INDEX: 30.22 KG/M2

## 2023-05-18 DIAGNOSIS — R55 NEAR SYNCOPE: Primary | ICD-10-CM

## 2023-05-18 PROCEDURE — 99204 OFFICE O/P NEW MOD 45 MIN: CPT | Performed by: INTERNAL MEDICINE

## 2023-05-18 NOTE — PROGRESS NOTES
Cardiology Consultation/Follow Up. [facility]    CC: Patient is here to establish cardiac care for near syncope. TIA Cortez is a 42-year-old female with no significant cardiac history here for initial evaluation. Apparently last month she had episode of near syncope while she was going to work. She reports a parking her car, when she suddenly felt lightheaded, dizzy, diaphoret followed by near loss of consciousness. She was sent to ER where initial work-up was negative for acute coronary syndrome. She denies having any further similar episodes. She denies any orthostatic dizziness or hypotension. Her blood pressure usually runs low at home. Otherwise she denies any chest pain, dyspnea, orthopnea lower extremity edema. No prior history of syncope.       Past Medical:  Past Medical History:   Diagnosis Date    Anxiety     Cerebrovascular disease     Mini stroke in 2006    CVA (cerebral infarction)     Facial weakness     left    Family history of colon cancer     Gastroesophageal reflux disease without esophagitis 5/31/2016    History of TIAs     Hypoglycemia     Left hemiparesis (HCC)     Memory problem     Migraine     Sleep difficulties     Speech abnormality     Tobacco abuse     Unspecified sleep apnea        Past Surgical:  Past Surgical History:   Procedure Laterality Date    APPENDECTOMY      CARDIAC CATHETERIZATION  7-2-15    nml    CHOLECYSTECTOMY      COLONOSCOPY  4/29/2015    2 polyps, sigmoid diverticulosis    FOOT SURGERY Right     HYSTERECTOMY (CERVIX STATUS UNKNOWN)      OVARIAN CYST SURGERY      TONSILLECTOMY         Family History:  Family History   Problem Relation Age of Onset    Diabetes Mother     Heart Disease Father         afib    High Blood Pressure Father     Diabetes Sister     Thyroid Disease Sister     Mental Retardation Sister     Cancer Brother         colon, prostate, lung    Diabetes Brother     Diabetes Brother     Other Brother

## 2023-05-18 NOTE — TELEPHONE ENCOUNTER
Pt stopped by and would like janette to know that medication for urine is not strong enough can I double or give me something stronger medication . Please return call to pt to let her know.

## 2023-05-24 DIAGNOSIS — K21.9 LPRD (LARYNGOPHARYNGEAL REFLUX DISEASE): ICD-10-CM

## 2023-05-24 DIAGNOSIS — K21.9 GASTROESOPHAGEAL REFLUX DISEASE, UNSPECIFIED WHETHER ESOPHAGITIS PRESENT: ICD-10-CM

## 2023-05-24 RX ORDER — RABEPRAZOLE SODIUM 20 MG/1
20 TABLET, DELAYED RELEASE ORAL DAILY
Qty: 10 TABLET | Refills: 0 | Status: SHIPPED | OUTPATIENT
Start: 2023-05-24

## 2023-05-24 RX ORDER — RABEPRAZOLE SODIUM 20 MG/1
20 TABLET, DELAYED RELEASE ORAL DAILY
Qty: 90 TABLET | Refills: 3 | Status: SHIPPED | OUTPATIENT
Start: 2023-05-24

## 2023-06-28 DIAGNOSIS — I63.9 CEREBROVASCULAR ACCIDENT (CVA), UNSPECIFIED MECHANISM (HCC): ICD-10-CM

## 2023-06-29 RX ORDER — CLOPIDOGREL BISULFATE 75 MG/1
TABLET ORAL
Qty: 90 TABLET | Refills: 1 | Status: SHIPPED | OUTPATIENT
Start: 2023-06-29

## 2023-07-05 ENCOUNTER — TELEPHONE (OUTPATIENT)
Dept: FAMILY MEDICINE CLINIC | Age: 63
End: 2023-07-05

## 2023-07-05 DIAGNOSIS — Z01.818 PREOP TESTING: Primary | ICD-10-CM

## 2023-07-05 NOTE — TELEPHONE ENCOUNTER
Saw 6/14 will you do clearance information with EKG on desk. Also need to know how long can hold plavix

## 2023-07-06 ENCOUNTER — HOSPITAL ENCOUNTER (OUTPATIENT)
Age: 63
Discharge: HOME | End: 2023-07-06
Payer: COMMERCIAL

## 2023-07-06 ENCOUNTER — TELEPHONE (OUTPATIENT)
Dept: FAMILY MEDICINE CLINIC | Age: 63
End: 2023-07-06

## 2023-07-06 DIAGNOSIS — Z01.818 PREOP TESTING: ICD-10-CM

## 2023-07-06 LAB
ANION GAP SERPL CALCULATED.3IONS-SCNC: 8 MMOL/L (ref 9–17)
BASOPHILS # BLD: 0.06 K/UL (ref 0–0.2)
BASOPHILS NFR BLD: 1 % (ref 0–2)
BUN SERPL-MCNC: 20 MG/DL (ref 8–23)
BUN/CREAT SERPL: 28 (ref 9–20)
CALCIUM SERPL-MCNC: 9.5 MG/DL (ref 8.6–10.4)
CHLORIDE SERPL-SCNC: 106 MMOL/L (ref 98–107)
CO2 SERPL-SCNC: 27 MMOL/L (ref 20–31)
CREAT SERPL-MCNC: 0.72 MG/DL (ref 0.5–0.9)
EOSINOPHIL # BLD: 0.15 K/UL (ref 0–0.44)
EOSINOPHILS RELATIVE PERCENT: 3 % (ref 1–4)
ERYTHROCYTE [DISTWIDTH] IN BLOOD BY AUTOMATED COUNT: 13.7 % (ref 11.8–14.4)
GFR SERPL CREATININE-BSD FRML MDRD: >60 ML/MIN/1.73M2
GLUCOSE SERPL-MCNC: 92 MG/DL (ref 70–99)
HCT VFR BLD AUTO: 41 % (ref 36.3–47.1)
HGB BLD-MCNC: 13.4 G/DL (ref 11.9–15.1)
IMM GRANULOCYTES # BLD AUTO: <0.03 K/UL (ref 0–0.3)
IMM GRANULOCYTES NFR BLD: 0 %
INR PPP: 1
LYMPHOCYTES # BLD: 32 % (ref 24–43)
LYMPHOCYTES NFR BLD: 1.76 K/UL (ref 1.1–3.7)
MCH RBC QN AUTO: 29.9 PG (ref 25.2–33.5)
MCHC RBC AUTO-ENTMCNC: 32.7 G/DL (ref 25.2–33.5)
MCV RBC AUTO: 91.5 FL (ref 82.6–102.9)
MONOCYTES NFR BLD: 0.4 K/UL (ref 0.1–1.2)
MONOCYTES NFR BLD: 7 % (ref 3–12)
NEUTROPHILS NFR BLD: 57 % (ref 36–65)
NEUTS SEG NFR BLD: 3.06 K/UL (ref 1.5–8.1)
NRBC BLD-RTO: 0 PER 100 WBC
PARTIAL THROMBOPLASTIN TIME: 25.3 SEC (ref 23.9–33.8)
PLATELET # BLD AUTO: 267 K/UL (ref 138–453)
PMV BLD AUTO: 9 FL (ref 8.1–13.5)
POTASSIUM SERPL-SCNC: 4.5 MMOL/L (ref 3.7–5.3)
PROTHROMBIN TIME: 12.3 SEC (ref 11.5–14.2)
RBC # BLD AUTO: 4.48 M/UL (ref 3.95–5.11)
SODIUM SERPL-SCNC: 141 MMOL/L (ref 135–144)
WBC OTHER # BLD: 5.5 K/UL (ref 3.5–11.3)

## 2023-07-06 PROCEDURE — 85730 THROMBOPLASTIN TIME PARTIAL: CPT

## 2023-07-06 PROCEDURE — 85027 COMPLETE CBC AUTOMATED: CPT

## 2023-07-06 PROCEDURE — 36415 COLL VENOUS BLD VENIPUNCTURE: CPT

## 2023-07-06 PROCEDURE — 80048 BASIC METABOLIC PNL TOTAL CA: CPT

## 2023-07-06 PROCEDURE — 85610 PROTHROMBIN TIME: CPT

## 2023-07-06 NOTE — TELEPHONE ENCOUNTER
Patient states yesterday called and ask for note to be taken as she was on vacation and developed athletes foot. Was told she would need to be seen and that they could not take a note. So patient went home and soaked feet in bleach water. Know today feet our peeling advised patient to use lotion several times a day and if further problems to call for appointment and also instructed not to soak feet in bleach water.

## 2023-08-18 ENCOUNTER — TELEPHONE (OUTPATIENT)
Dept: FAMILY MEDICINE CLINIC | Age: 63
End: 2023-08-18

## 2023-08-18 DIAGNOSIS — L98.9 SKIN ABNORMALITIES: Primary | ICD-10-CM

## 2023-09-01 DIAGNOSIS — F43.21 GRIEF REACTION: ICD-10-CM

## 2023-09-01 RX ORDER — ESCITALOPRAM OXALATE 10 MG/1
10 TABLET ORAL DAILY
Qty: 90 TABLET | Refills: 0 | Status: SHIPPED | OUTPATIENT
Start: 2023-09-01

## 2023-09-01 NOTE — TELEPHONE ENCOUNTER
Arpita Palmer called requesting a refill of the below medication which has been pended for you:     Requested Prescriptions     Pending Prescriptions Disp Refills    escitalopram (LEXAPRO) 10 MG tablet 90 tablet 1     Sig: Take 1 tablet by mouth daily       Last Appointment Date: 6/14/2023  Next Appointment Date: 12/04/2023    Allergies   Allergen Reactions    Tape Tj Cox] Rash

## 2023-09-07 ENCOUNTER — HOSPITAL ENCOUNTER (OUTPATIENT)
Dept: PHYSICAL THERAPY | Age: 63
Setting detail: THERAPIES SERIES
Discharge: HOME | End: 2023-09-07
Payer: COMMERCIAL

## 2023-09-07 DIAGNOSIS — M76.821 POSTERIOR TIBIAL TENDINITIS, RIGHT: ICD-10-CM

## 2023-09-07 DIAGNOSIS — Z98.890 POST-OPERATIVE STATE: Primary | ICD-10-CM

## 2023-09-07 DIAGNOSIS — M72.2 PLANTAR FASCIITIS, RIGHT: ICD-10-CM

## 2023-09-07 DIAGNOSIS — M21.41 PES PLANUS OF RIGHT FOOT: ICD-10-CM

## 2023-09-07 PROCEDURE — 97161 PT EVAL LOW COMPLEX 20 MIN: CPT

## 2023-09-07 PROCEDURE — 97110 THERAPEUTIC EXERCISES: CPT

## 2023-09-07 ASSESSMENT — PAIN SCALES - GENERAL: PAINLEVEL_OUTOF10: 5

## 2023-09-07 ASSESSMENT — PAIN DESCRIPTION - DESCRIPTORS: DESCRIPTORS: THROBBING;SPASM

## 2023-09-07 ASSESSMENT — PAIN DESCRIPTION - ORIENTATION: ORIENTATION: RIGHT

## 2023-09-07 ASSESSMENT — PAIN DESCRIPTION - LOCATION: LOCATION: ANKLE

## 2023-09-07 ASSESSMENT — PAIN DESCRIPTION - PAIN TYPE: TYPE: SURGICAL PAIN;ACUTE PAIN

## 2023-09-07 NOTE — PROGRESS NOTES
demosntrate right ankle AROM to 10 deg DF, 38 deg PF, 20 deg inversion, and 15 deg eversion to allow patient to ambulate over uneven surfaces with greater ease. Long Term Goal 3: Patient will demostrate right ankle strength to 4+/5 to allow patient to navigate stairs with greater ease. Long Term Goal 4: Patient will report ability to return to work without restrictions  Long Term Goal 5: IND with HEP for continued progression following PT.        Therapy Time:   Individual Concurrent Group Co-treatment   Time In 1105         Time Out 1145         Minutes 40         Timed Code Treatment Minutes: 10 Minutes       Coretha Kanner, PT

## 2023-09-07 NOTE — PLAN OF CARE
[facility]    [] [place]  Phone: [phone]  Fax: [phone]      [] [place]  Phone: [phone]  Fax: [phone]        To:  Helen Loyola MD         Patient: Denise Pack  : 1960   MRN: [id number]  Evaluation Date: 2023      Diagnosis Information:  Diagnosis: 1. Post-operative state Z98.890  2. Plantar fasciitis, right M72.2   3. Posterior tibial tendinitis, right M76.821  4. Pes planus of right foot M21.41         Physical Therapy Certification    Dear Dr. Helen Loyola MD    The following patient has been evaluated for physical therapy services and for therapy to continue, Medicare requires monthly physician review of the treatment plan. Please review the attached evaluation and/or summary of the patient's plan of care, and verify that you agree therapy should continue by signing the attached document and sending it back to our office. Plan of Care/Treatment to date:  [x] Therapeutic Exercise    [x] Modalities:  [x] Therapeutic Activity     [x] Ultrasound  [] Electrical Stimulation  [x] Gait Training      [] Cervical Traction [] Lumbar Traction  [x] Neuromuscular Re-education    [x] Cold/hotpack [x] Iontophoresis   [x] Instruction in HEP     Other:  [x] Manual Therapy      []             [] Aquatic Therapy      []                 Goals:  Short Term Goals  Time Frame for Short Term Goals: 2 weeks  Short Term Goal 1: Initiate additional HEP    Long Term Goals  Time Frame for Long Term Goals : 4 weeks  Long Term Goal 1: Patient will score > or = to 65/84 on the FAAM to allow patient to complete her daily ADLs with greater ease. Long Term Goal 2: Patient will demosntrate right ankle AROM to 10 deg DF, 38 deg PF, 20 deg inversion, and 15 deg eversion to allow patient to ambulate over uneven surfaces with greater ease.   Long Term Goal 3: Patient will demostrate right ankle strength to 4+/5 to allow patient

## 2023-09-14 ENCOUNTER — HOSPITAL ENCOUNTER (OUTPATIENT)
Dept: PHYSICAL THERAPY | Age: 63
Setting detail: THERAPIES SERIES
Discharge: HOME | End: 2023-09-14
Payer: COMMERCIAL

## 2023-09-14 ENCOUNTER — APPOINTMENT (OUTPATIENT)
Dept: PHYSICAL THERAPY | Age: 63
End: 2023-09-14
Payer: COMMERCIAL

## 2023-09-14 PROCEDURE — 97110 THERAPEUTIC EXERCISES: CPT | Performed by: PHYSICAL THERAPY ASSISTANT

## 2023-09-14 NOTE — FLOWSHEET NOTE
Physical Therapy Daily Treatment Note    Date:  2023    Patient Name:  Sofia Montalvo    :  1960  MRN: [id number]  Restrictions/Precautions:     Medical/Treatment Diagnosis Information:   Diagnosis: 1. Post-operative state Z98.890  2. Plantar fasciitis, right M72.2   3. Posterior tibial tendinitis, right M76.821  4. Pes planus of right foot I75.71  Insurance/Certification information:  PT Insurance Information: [facility]  Physician Information:   Mally Coronado MD  Plan of care signed (Y/N):  N  Visit# / total visits:    Pain level: 5/10       Time In: 218  Time Out: 300    Progress Note: []  Yes  [x]  No  Next due by: Visit #10  or by 10/5/23    Subjective:   Pain this date rated 5/10 through lateral ankle. Notes need to walk several steps today due to not having wheelchair when coming to therapy. To RTD on 23. Potential RTW 23. Objective: SHADE complete per flow chart to facilitate strength, motion and stability to allow ease with daily activities and eventual return to work. Advanced and progressed several exercises to improve motion and strength. Initiated weight bearing ambulation as per script.      Observation:   Test measurements:      Per order: 2-3x/week for 2-4 weeks, progress wt bearing in cam walker, ROM, ionto, u/s    Exercises:   Exercise/Equipment Resistance/Repetitions Other comments        Bike/nustep 5' NUSTEP        Gastric/soleus Stretch           Ankle AROM/Circles  20x    Ankle Alphabet  1x    BAPS  10x L1 Sitting Initiated   Ankle isometrics     skateboard 3' Initiated   Toe curls 20x Initiated   Seated HR/TR 10x Initiated   Prostretch     4-way ankle Tband           Weight shifting 15x 25%  of BW this date with CAM and SW              [x] Provided verbal/tactile cueing for activities related to strengthening, flexibility, endurance, ROM. (49226)  [] Provided verbal/tactile cueing for activities related to improving balance, coordination, kinesthetic

## 2023-09-18 ENCOUNTER — HOSPITAL ENCOUNTER (OUTPATIENT)
Dept: PHYSICAL THERAPY | Age: 63
Setting detail: THERAPIES SERIES
Discharge: HOME | End: 2023-09-18
Payer: COMMERCIAL

## 2023-09-18 PROCEDURE — 97110 THERAPEUTIC EXERCISES: CPT

## 2023-09-18 NOTE — FLOWSHEET NOTE
sense, posture, motor skill, proprioception. (49958)    Therapeutic Activities:     [] Therapeutic activities, direct (one-on-one) patient contact (use of dynamic activities to improve functional performance). (89448)    Gait:   [] Provided training and instruction to the patient for ambulation re-education. (69468)    Self-Care/ADL's  [] Self-care/home management training and compensatory training, meal preparation, safety procedures, and instructions in use of assistive technology devices/adaptive equipment, direct one-on-one contact. (51426)    Home Exercise Program:     [x] Reviewed/Progressed HEP activities related to strengthening, flexibility, endurance, ROM. (18341)  [] Reviewed/Progressed HEP activities related to improving balance, coordination, kinesthetic sense, posture, motor skill, proprioception.  (05082)    Manual Treatments:    [] Provided manual therapy to mobilize soft tissue/joints for the purpose of modulating pain, promoting relaxation,  increasing ROM, reducing/eliminating soft tissue swelling/inflammation/restriction, improving soft tissue extensibility. (56350)    Service Based Modalities:      Timed Code Treatment Minutes:   35 SHADE    Total Treatment Minutes:   35    Treatment/Activity Tolerance:  [x] Patient tolerated treatment well [] Patient limited by fatique  [] Patient limited by pain  [] Patient limited by other medical complications  [] Other:     Prognosis: [x] Good [] Fair  [] Poor    Patient Requires Follow-up: [x] Yes  [] No      Goals:  Short Term Goals  Time Frame for Short Term Goals: 2 weeks  Short Term Goal 1: Initiate additional HEP    Long Term Goals  Time Frame for Long Term Goals : 4 weeks  Long Term Goal 1: Patient will score > or = to 65/84 on the FAAM to allow patient to complete her daily ADLs with greater ease.   Long Term Goal 2: Patient will demosntrate right ankle AROM to 10 deg DF, 38 deg PF, 20 deg inversion, and 15 deg eversion to allow patient to ambulate
yes

## 2023-09-20 ENCOUNTER — HOSPITAL ENCOUNTER (OUTPATIENT)
Dept: PHYSICAL THERAPY | Age: 63
Setting detail: THERAPIES SERIES
Discharge: HOME | End: 2023-09-20
Payer: COMMERCIAL

## 2023-09-20 ENCOUNTER — APPOINTMENT (OUTPATIENT)
Dept: PHYSICAL THERAPY | Age: 63
End: 2023-09-20
Payer: COMMERCIAL

## 2023-09-20 PROCEDURE — 97110 THERAPEUTIC EXERCISES: CPT

## 2023-09-20 NOTE — FLOWSHEET NOTE
Physical Therapy Daily Treatment Note    Date:  2023    Patient Name:  Bautista Bates    :  1960  MRN: [id number]  Restrictions/Precautions:     Medical/Treatment Diagnosis Information:   Diagnosis: 1. Post-operative state Z98.890  2. Plantar fasciitis, right M72.2   3. Posterior tibial tendinitis, right M76.821  4. Pes planus of right foot X52.28  Insurance/Certification information:  PT Insurance Information: [facility]  Physician Information:   Paras Nye MD  Plan of care signed (Y/N):  N  Visit# / total visits:    Pain level: 3/10       Time In: 8:40  Time Out: 9:24    Progress Note: []  Yes  [x]  No  Next due by: Visit #10  or by 10/5/23    Subjective:  Patient is experiencing 3/10 pain this date. Notes she had a bad night with pain the night before. Had to take medication for pain this morning. To RTD on 23. Objective: SHADE complete per flow chart to facilitate strength, motion and stability to allow ease with daily activities and eventual return to work. Working towards performing full weight bearing in boot with counter exercises. Performing approx 75% with use of UE support at bar currently. Next session attempt counter exercises at 100% WB in CAM boot. Initiate step ups if tolerates 100% WB. Observation:   Test measurements:      Per order: 2-3x/week for 2-4 weeks, progress wt bearing in cam walker, ROM, ionto, u/s    Exercises:   Exercise/Equipment Resistance/Repetitions Other comments        Bike/nustep 6' NUSTEP        Gastric/soleus Stretch           Ankle AROM/Circles  20x    Ankle Alphabet  1x    BAPS  10x L1 Sitting    Ankle isometrics     skateboard 3'    Toe curls 20x    Seated HR/TR 10x    Prostretch 10x    4-way ankle Tband  HEP         Weight shifting 20x 75%  of BW this date with CAM and SW    Mini marches  15x Approx 75% WB        [x] Provided verbal/tactile cueing for activities related to strengthening, flexibility, endurance, ROM.

## 2023-09-22 ENCOUNTER — HOSPITAL ENCOUNTER (OUTPATIENT)
Dept: PHYSICAL THERAPY | Age: 63
Setting detail: THERAPIES SERIES
Discharge: HOME | End: 2023-09-22
Payer: COMMERCIAL

## 2023-09-22 PROCEDURE — 97110 THERAPEUTIC EXERCISES: CPT

## 2023-09-22 NOTE — FLOWSHEET NOTE
Physical Therapy Daily Treatment Note    Date:  2023    Patient Name:  Dereck Sánchez    :  1960  MRN: [id number]  Restrictions/Precautions:     Medical/Treatment Diagnosis Information:   Diagnosis: 1. Post-operative state Z98.890  2. Plantar fasciitis, right M72.2   3. Posterior tibial tendinitis, right M76.821  4. Pes planus of right foot Y63.35  Insurance/Certification information:  PT Insurance Information: [facility]  Physician Information:   Prema Gale MD  Plan of care signed (Y/N):  N  Visit# / total visits:    Pain level: 3/10       Time In: 8:40  Time Out: 9:20    Progress Note: []  Yes  [x]  No  Next due by: Visit #10  or by 10/5/23    Subjective:  Seen physician the other day. Will be in boot another month. Very sore after last session however did a lot that day and was not able to elevate foot much. Has an arch wedge now. RTD: 10/4/23    Objective: SHADE complete per flow chart to facilitate strength, motion and stability to allow ease with daily activities and eventual return to work. Working towards performing full weight bearing in boot with counter exercises. Performing approx 75% with use of UE support at bar currently. Next session attempt counter exercises at 100% WB in CAM boot. Initiate step ups if tolerates 100% WB.      Observation:   Test measurements:  R ankle DF: 3+/5    Per order: 2-3x/week for 2-4 weeks, progress wt bearing in cam walker, ROM, ionto, u/s    Exercises:   Exercise/Equipment Resistance/Repetitions Other comments        Bike/nustep 6' NUSTEP        Gastric/soleus Stretch           Ankle AROM/Circles  20x    Ankle Alphabet  1x    BAPS  10x L1 Sitting    Ankle isometrics     skateboard 3'    Toe curls 20x    Seated HR/TR 15x    Prostretch 10x    4-way ankle Tband  HEP         Weight shifting 20x 80%  of BW this date with CAM and SW    Mini marches  15x Approx 75% WB        [x] Provided verbal/tactile cueing for activities related to

## 2023-09-25 ENCOUNTER — HOSPITAL ENCOUNTER (OUTPATIENT)
Dept: PHYSICAL THERAPY | Age: 63
Setting detail: THERAPIES SERIES
Discharge: HOME | End: 2023-09-25
Payer: COMMERCIAL

## 2023-09-25 PROCEDURE — 97110 THERAPEUTIC EXERCISES: CPT | Performed by: PHYSICAL THERAPY ASSISTANT

## 2023-09-25 NOTE — FLOWSHEET NOTE
Physical Therapy Daily Treatment Note    Date:  2023    Patient Name:  Denise Pack    :  1960  MRN: [id number]  Restrictions/Precautions:     Medical/Treatment Diagnosis Information:   Diagnosis: 1. Post-operative state Z98.890  2. Plantar fasciitis, right M72.2   3. Posterior tibial tendinitis, right M76.821  4. Pes planus of right foot B05.21  Insurance/Certification information:  PT Insurance Information: [facility]  Physician Information:   Helen Loyola MD  Plan of care signed (Y/N):  N  Visit# / total visits:    Pain level: 2-3/10       Time In: 1051  Time Out: 6990    Progress Note: []  Yes  [x]  No  Next due by: Visit #10  or by 10/5/23    Subjective:  General soreness remains. Pain this date rated 2-3/10 through foot. Notes increase pain after standing at [employer] over weekend. RTD: 10/4/23    Objective: SHADE complete per flow chart to facilitate strength, motion and stability to allow ease with daily activities and eventual return to work. Working towards performing full weight bearing in boot with counter exercises. Performing approx 75% with use of UE support at bar currently. Difficulty and increased pain remains with increased weight bearing.      Observation:   Test measurements:  R ankle DF: 3+/5    Per order: 2-3x/week for 2-4 weeks, progress wt bearing in cam walker, ROM, ionto, u/s    Exercises:   Exercise/Equipment Resistance/Repetitions Other comments        Bike/nustep 6' NUSTEP        Gastric/soleus Stretch           Ankle AROM/Circles  20x    Ankle Alphabet  1x    BAPS  10x L1 Sitting    Ankle isometrics     skateboard 3'    Toe curls 20x    Seated HR/TR 15x    Prostretch 10x    4-way ankle Tband  HEP         Weight shifting 20x 80%  of BW this date with CAM and SW    Mini marches  15x Approx 75% WB        [x] Provided verbal/tactile cueing for activities related to strengthening, flexibility, endurance, ROM. (02267)  [] Provided verbal/tactile cueing

## 2023-09-28 ENCOUNTER — HOSPITAL ENCOUNTER (OUTPATIENT)
Dept: PHYSICAL THERAPY | Age: 63
Setting detail: THERAPIES SERIES
Discharge: HOME | End: 2023-09-28
Payer: COMMERCIAL

## 2023-09-28 PROCEDURE — 97110 THERAPEUTIC EXERCISES: CPT | Performed by: PHYSICAL THERAPY ASSISTANT

## 2023-10-02 ENCOUNTER — HOSPITAL ENCOUNTER (OUTPATIENT)
Dept: PHYSICAL THERAPY | Age: 63
Setting detail: THERAPIES SERIES
Discharge: HOME | End: 2023-10-02
Payer: COMMERCIAL

## 2023-10-02 PROCEDURE — 97110 THERAPEUTIC EXERCISES: CPT

## 2023-10-02 NOTE — FLOWSHEET NOTE
Physical Therapy Daily Treatment Note    Date:  10/2/2023    Patient Name:  Deanna Ford    :  1960  MRN: [id number]  Restrictions/Precautions:     Medical/Treatment Diagnosis Information:   Diagnosis: 1. Post-operative state Z98.890  2. Plantar fasciitis, right M72.2   3. Posterior tibial tendinitis, right M76.821  4. Pes planus of right foot P43.38  Insurance/Certification information:  PT Insurance Information: [facility]  Physician Information:   Ute Vásquez MD  Plan of care signed (Y/N):  N  Visit# / total visits:    Pain level: 3-4/10       Time In: 11:21 am   Time Out: 11:54 am     Progress Note: []  Yes  [x]  No  Next due by: Visit #10  or by 10/5/23    Subjective:  Pt. reports improvements in pain 3-4/10 this date. Able to shower on her own before therapy. Has appt with podiatrist Wednesday to discuss next steps and RTW date. Tentative RDW: 10/22/23    Objective: SHADE complete per flow chart to facilitate strength, motion and stability to allow ease with daily activities and eventual return to work. Added weight bearing exercises this date. Verbal cuing for progression. Encouraged patient to ice ankle to reduce swelling. Understanding noted. Observation: Increased swelling along posterior aspect of ankle.    Test measurements:    Per order: 2-3x/week for 2-4 weeks, progress wt bearing in cam walker, ROM, ionto, u/s    Exercises:   Exercise/Equipment Resistance/Repetitions Other comments        Bike/nustep 6' NUSTEP        Gastric/soleus Stretch           Ankle AROM/Circles  20x    Ankle Alphabet  1x    BAPS  15x L1 Sitting    Ankle isometrics     skateboard 3'    Toe curls 20x    Seated HR/TR 20x    Prostretch 15x    4-way ankle Tband  HEP         Weight shifting 20x 80%  of BW this date with CAM and SW    Mini marches  15x Approx 75% WB        [x] Provided verbal/tactile cueing for activities related to strengthening, flexibility, endurance, ROM. (73428)  [] Provided

## 2023-10-04 ENCOUNTER — HOSPITAL ENCOUNTER (OUTPATIENT)
Dept: PHYSICAL THERAPY | Age: 63
Setting detail: THERAPIES SERIES
Discharge: HOME | End: 2023-10-04
Payer: COMMERCIAL

## 2023-10-04 PROCEDURE — 97110 THERAPEUTIC EXERCISES: CPT

## 2023-10-04 NOTE — FLOWSHEET NOTE
Physical Therapy Daily Treatment Note    Date:  10/4/2023    Patient Name:  Britt Rizo    :  1960  MRN: [id number]  Restrictions/Precautions:     Medical/Treatment Diagnosis Information:   Diagnosis: 1. Post-operative state Z98.890  2. Plantar fasciitis, right M72.2   3. Posterior tibial tendinitis, right M76.821  4. Pes planus of right foot Z96.38  Insurance/Certification information:  PT Insurance Information: [facility]  Physician Information:   Christian Whaley MD  Plan of care signed (Y/N):  N  Visit# / total visits:    Pain level: 3-4/10       Time In: 9:40 am   Time Out: 10:10 am     Progress Note: []  Yes  [x]  No  Next due by: Visit #10  or by 10/5/23    Subjective:  Pt reports foot is feeling a little better. Tried to mop yesterday and had pain after. Has appt with podiatrist Wednesday to discuss next steps and RTW date. Tentative RDW: 10/22/23    Objective: SHADE complete per flow chart to facilitate strength, motion and stability to allow ease with daily activities and eventual return to work. Added weight bearing exercises this date. Verbal cuing for progression. Encouraged patient to ice ankle to reduce swelling. Understanding noted. Observation: Increased swelling along posterior aspect of ankle. Test measurements: DF: 3+/5    Per order: 2-3x/week for 2-4 weeks, progress wt bearing in cam walker, ROM, ionto, u/s    Exercises:   Exercise/Equipment Resistance/Repetitions Other comments        Bike/nustep 6' NUSTEP        Gastric/soleus Stretch           Ankle AROM/Circles  20x red    Ankle Alphabet  1x red    BAPS  15x L1 Sitting    Ankle isometrics     skateboard 3'    Toe curls 20x    Seated HR/TR 20x    Prostretch 15x    4-way ankle Tband  HEP         Weight shifting 20x 100%  of BW this date with CAM and SW    Mini marches  15x Approx 100% WB   Squats  10x    [x] Provided verbal/tactile cueing for activities related to strengthening, flexibility, endurance, ROM.

## 2023-10-06 ENCOUNTER — HOSPITAL ENCOUNTER (OUTPATIENT)
Dept: PHYSICAL THERAPY | Age: 63
Setting detail: THERAPIES SERIES
Discharge: HOME | End: 2023-10-06
Payer: COMMERCIAL

## 2023-10-06 PROCEDURE — 97110 THERAPEUTIC EXERCISES: CPT

## 2023-10-06 NOTE — PLAN OF CARE
[facility]    [x] [place]  Phone: [phone]  Fax: [phone]      [] [place]  Phone: [phone]  Fax: [phone]    Physical Therapy Progress Note  Date: 10/6/2023        Patient Name:  Marcella Sotomayor    :  1960  MRN: [id number]  Restrictions/Precautions:     Medical/Treatment Diagnosis Information:   Diagnosis: 1. Post-operative state Z98.890  2. Plantar fasciitis, right M72.2   3. Posterior tibial tendinitis, right M76.821  4. Pes planus of right foot P02.93  Insurance/Certification information:  PT Insurance Information: [employer]  Physician Information:   Estrellita Jackman MD  Plan of care signed (Y/N):  N  Visit# / total visits:  1/10 2nd POC 10 total   Pain level:      3-4/10      Time Period for Report: 23 - 10/6/23     Plan of Care/Treatment to date:  [x] Therapeutic Exercise    [] Modalities:  [] Therapeutic Activity     [] Ultrasound  [] Electrical Stimulation  [] Gait Training      [] Cervical Traction    [] Lumbar Traction  [] Neuromuscular Re-education  [] Cold/hotpack [] Iontophoresis  [x] Instruction in HEP      Other:  [] Manual Therapy       []    [] Aquatic Therapy       []                           Subjective:  Patient arrived to therapy reporting 4/10 pain in right foot. Noting that she saw the physician on Wednesday who told her that she is able to transition into ambulating in a tennis shoe  for 2 hrs/day. Reporting that she was walking in her shoe yesterday and noting it was about 2 hrs and 20 minutes. Can start to drive with a shoe on for short distances, but pt reporting not feeling comfortable with that. RTD: 10/17/23  Tentative RDW: 10/22/23 tentatively, but looking at more like November timeframe. Objective: Attempted to view physicians note from recent visit, but unable to see physician note in Care Everywhere at this time. Assessed all goals this date.  SHADE complete per flow chart to

## 2023-10-06 NOTE — FLOWSHEET NOTE
Physical Therapy Daily Treatment Note    Date:  10/6/2023    Patient Name:  Clive Haq    :  1960  MRN: [id number]  Restrictions/Precautions:     Medical/Treatment Diagnosis Information:   Diagnosis: 1. Post-operative state Z98.890  2. Plantar fasciitis, right M72.2   3. Posterior tibial tendinitis, right M76.821  4. Pes planus of right foot A12.66  Insurance/Certification information:  PT Insurance Information: [facility]  Physician Information:   Debo Garcia MD  Plan of care signed (Y/N):  N  Visit# / total visits:  1/10 2nd POC 10 total   Pain level: 3-4/10       Time In:  1106 am   Time Out:  9588 am     Progress Note: [x]  Yes []  No  Next due by: Visit #10  or by 11/3/23    Subjective:  Patient arrived to therapy reporting 4/10 pain in right foot. Noting that she saw the physician on Wednesday who told her that she is able to transition into ambulating in a tennis shoe  for 2 hrs/day. Reporting that she was walking in her shoe yesterday and noting it was about 2 hrs and 20 minutes. Can start to drive with a shoe on for short distances, but pt reporting not feeling comfortable with that. RTD: 10/17/23  Tentative RDW: 10/22/23 tentatively, but looking at more like November timeframe. Objective: Attempted to view physicians note from recent visit, but unable to see physician note in Care Everywhere at this time. Assessed all goals this date. SHADE complete per flow chart to facilitate strength, motion and stability to allow ease with daily activities and eventual return to work. Able to advance some weight bearing exercises this ate with fair tolerance. Verbal cuing for progression. Encouraged patient to ice ankle to reduce swelling/pain. Understanding noted. Will plan to continue 2-3x/week for 4 weeks. Observation: Increased swelling along posterior lateral aspect of ankle.      Test measurements: R ankle 3+/5 in all motions this date in available range     Scored 27/84 on

## 2023-10-09 ENCOUNTER — HOSPITAL ENCOUNTER (OUTPATIENT)
Dept: PHYSICAL THERAPY | Age: 63
Setting detail: THERAPIES SERIES
Discharge: HOME | End: 2023-10-09
Payer: COMMERCIAL

## 2023-10-09 NOTE — PROGRESS NOTES
Physical Therapy  Outpatient Physical Therapy    [] [place]  Phone: [phone]  Fax: [phone]      [] [place]  Phone: [phone]  Fax: [phone]    Physical Therapy  Cancellation/No-show Note  Patient Name:  Jose Maria Brown  :  1960   Date:  10/9/2023  Cancelled visits to date: 0  No-shows to date: 0    For today's appointment patient:  [x]  Cancelled  []  Rescheduled appointment  []  No-show     Reason given by patient:  []  Patient ill  []  Conflicting appointment  []  No transportation    []  Conflict with work  []  No reason given  []  Other:     Comments:      Electronically signed by: Raegan Moore PT

## 2023-10-10 NOTE — ED PROVIDER NOTES
Pt called 10/10 @ 12:21 PM    Pt stated last time she was in the office, Dr James Castro wanted her to get thyroid imaging done prior to coming into the office. She would like to make sure those orders are in and would like to discuss where she can get them done at since she lives in [place].     Pt# [phone] disease, CVA (cerebral infarction), Facial weakness, Family history of colon cancer, Gastroesophageal reflux disease without esophagitis, History of TIAs, Hypoglycemia, Left hemiparesis ([address] Memory problem, Migraine, Sleep difficulties, Speech abnormality, Tobacco abuse, and Unspecified sleep apnea. SURGICAL HISTORY      has a past surgical history that includes Appendectomy; Hysterectomy; Ovarian cyst surgery; Tonsillectomy; Colonoscopy (2015); Cardiac catheterization (7-2-15); Foot surgery (Right); and Cholecystectomy. CURRENT MEDICATIONS       Previous Medications    ACETAMINOPHEN (TYLENOL) 500 MG TABLET    Take 1 tablet by mouth every 6 hours as needed for Pain (pain)    CELECOXIB (CELEBREX) 200 MG CAPSULE    TAKE 1 CAPSULE DAILY    CHOLECALCIFEROL (VITAMIN D3 PO)    Take by mouth daily     CLOPIDOGREL (PLAVIX) 75 MG TABLET    TAKE 1 TABLET DAILY    DIPHENHYDRAMINE (BENADRYL) 25 MG CAPSULE    Take 1 capsule by mouth every 6 hours as needed for Itching    ESCITALOPRAM (LEXAPRO) 10 MG TABLET    Take 1 tablet by mouth daily    FAMOTIDINE (PEPCID) 40 MG TABLET    Take 1 tablet by mouth daily    GABAPENTIN (NEURONTIN) 300 MG CAPSULE        MAGNESIUM OXIDE (MAG-OX) 400 MG TABLET    Take 1 tablet by mouth daily    RIZATRIPTAN (MAXALT) 10 MG TABLET    Take 1 tablet by mouth once as needed for Migraine May repeat in 2 hours if needed    SOLIFENACIN (VESICARE) 5 MG TABLET    Take 1 tablet by mouth daily for 10 days    TRAZODONE (DESYREL) 50 MG TABLET    Take 1 tablet by mouth nightly as needed for Sleep       ALLERGIES     is allergic to tape [adhesive tape]. FAMILY HISTORY     She indicated that her mother is alive. She indicated that her father is alive. She indicated that her sister is . She indicated that four of her five brothers are alive. She indicated that her maternal grandmother is . She indicated that her maternal grandfather is .  She indicated that her paternal

## 2023-10-11 ENCOUNTER — HOSPITAL ENCOUNTER (OUTPATIENT)
Dept: PHYSICAL THERAPY | Age: 63
Setting detail: THERAPIES SERIES
Discharge: HOME | End: 2023-10-11
Payer: COMMERCIAL

## 2023-10-11 ENCOUNTER — HOSPITAL ENCOUNTER (OUTPATIENT)
Age: 63
Discharge: HOME | End: 2023-10-13
Attending: INTERNAL MEDICINE
Payer: COMMERCIAL

## 2023-10-11 VITALS
HEART RATE: 73 BPM | SYSTOLIC BLOOD PRESSURE: 115 MMHG | BODY MASS INDEX: 30.22 KG/M2 | WEIGHT: 177 LBS | DIASTOLIC BLOOD PRESSURE: 57 MMHG | HEIGHT: 64 IN

## 2023-10-11 DIAGNOSIS — R55 NEAR SYNCOPE: ICD-10-CM

## 2023-10-11 LAB
ECHO AO ASC DIAM: 3 CM
ECHO AO ASCENDING AORTA INDEX: 1.61 CM/M2
ECHO AO ROOT DIAM: 3.1 CM
ECHO AO ROOT INDEX: 1.67 CM/M2
ECHO AV AREA PEAK VELOCITY: 2.1 CM2
ECHO AV AREA/BSA PEAK VELOCITY: 1.1 CM2/M2
ECHO AV PEAK GRADIENT: 9 MMHG
ECHO AV PEAK VELOCITY: 1.5 M/S
ECHO AV VELOCITY RATIO: 0.73
ECHO BSA: 1.9 M2
ECHO EST RA PRESSURE: 3 MMHG
ECHO LA DIAMETER INDEX: 1.34 CM/M2
ECHO LA DIAMETER: 2.5 CM
ECHO LA TO AORTIC ROOT RATIO: 0.81
ECHO LV E' LATERAL VELOCITY: 10 CM/S
ECHO LV E' SEPTAL VELOCITY: 8 CM/S
ECHO LV EDV A2C: 81 ML
ECHO LV EDV A4C: 50 ML
ECHO LV EDV INDEX A4C: 27 ML/M2
ECHO LV EDV NDEX A2C: 44 ML/M2
ECHO LV EJECTION FRACTION A2C: 70 %
ECHO LV EJECTION FRACTION A4C: 68 %
ECHO LV EJECTION FRACTION BIPLANE: 70 % (ref 55–100)
ECHO LV ESV A2C: 25 ML
ECHO LV ESV A4C: 17 ML
ECHO LV ESV INDEX A2C: 13 ML/M2
ECHO LV ESV INDEX A4C: 9 ML/M2
ECHO LV FRACTIONAL SHORTENING: 38 % (ref 28–44)
ECHO LV INTERNAL DIMENSION DIASTOLE INDEX: 2.1 CM/M2
ECHO LV INTERNAL DIMENSION DIASTOLIC: 3.9 CM (ref 3.9–5.3)
ECHO LV INTERNAL DIMENSION SYSTOLIC INDEX: 1.29 CM/M2
ECHO LV INTERNAL DIMENSION SYSTOLIC: 2.4 CM
ECHO LV ISOVOLUMETRIC RELAXATION TIME (IVRT): 85 MS
ECHO LV IVSD: 1.2 CM (ref 0.6–0.9)
ECHO LV MASS 2D: 149.5 G (ref 67–162)
ECHO LV MASS INDEX 2D: 80.4 G/M2 (ref 43–95)
ECHO LV POSTERIOR WALL DIASTOLIC: 1.1 CM (ref 0.6–0.9)
ECHO LV RELATIVE WALL THICKNESS RATIO: 0.56
ECHO LVOT AREA: 2.8 CM2
ECHO LVOT DIAM: 1.9 CM
ECHO LVOT PEAK GRADIENT: 5 MMHG
ECHO LVOT PEAK VELOCITY: 1.1 M/S
ECHO MV A VELOCITY: 0.56 M/S
ECHO MV E DECELERATION TIME (DT): 268 MS
ECHO MV E VELOCITY: 0.83 M/S
ECHO MV E/A RATIO: 1.48
ECHO MV E/E' LATERAL: 8.3
ECHO MV E/E' RATIO (AVERAGED): 9.34
ECHO MV E/E' SEPTAL: 10.38
ECHO MV MAX VELOCITY: 1 M/S
ECHO MV PEAK GRADIENT: 4 MMHG
ECHO PV MAX VELOCITY: 1.1 M/S
ECHO PV PEAK GRADIENT: 5 MMHG
ECHO RIGHT VENTRICULAR SYSTOLIC PRESSURE (RVSP): 17 MMHG
ECHO TV PEAK GRADIENT: 4 MMHG
ECHO TV REGURGITANT MAX VELOCITY: 1.9 M/S
ECHO TV REGURGITANT PEAK GRADIENT: 14 MMHG

## 2023-10-11 PROCEDURE — 93306 TTE W/DOPPLER COMPLETE: CPT

## 2023-10-11 PROCEDURE — 93306 TTE W/DOPPLER COMPLETE: CPT | Performed by: INTERNAL MEDICINE

## 2023-10-11 PROCEDURE — 97110 THERAPEUTIC EXERCISES: CPT

## 2023-10-11 NOTE — FLOWSHEET NOTE
Other:     Prognosis: [x] Good [] Fair  [] Poor    Patient Requires Follow-up: [x] Yes  [] No      Goals:  Short Term Goals  Time Frame for Short Term Goals: 2 weeks  Short Term Goal 1: Initiate additional HEP Met (given)    Long Term Goals  Time Frame for Long Term Goals : 4 weeks  Long Term Goal 1: Patient will score > or = to 65/84 on the FAAM to allow patient to complete her daily ADLs with greater ease. (See above)  Long Term Goal 2: Patient will demosntrate right ankle AROM to 10 deg DF, 38 deg PF, 20 deg inversion, and 15 deg eversion to allow patient to ambulate over uneven surfaces with greater ease. (Ongoing)  Long Term Goal 3: Patient will demostrate right ankle strength to 4+/5 to allow patient to navigate stairs with greater ease. (Ongoing)  Long Term Goal 4: Patient will report ability to return to work without restrictions (ongoing)  Long Term Goal 5: IND with HEP for continued progression following PT. (Verbalized compliance)    Plan:   [x] Continue per plan of care [] Alter current plan (see comments)  [] Plan of care initiated [] Hold pending MD visit [] Discharge    Plan for Next Session:  Monitor tolerance and advance as able.      Electronically signed by:  Annalisa Irwin PT

## 2023-10-13 ENCOUNTER — HOSPITAL ENCOUNTER (OUTPATIENT)
Dept: PHYSICAL THERAPY | Age: 63
Setting detail: THERAPIES SERIES
Discharge: HOME | End: 2023-10-13
Payer: COMMERCIAL

## 2023-10-13 PROCEDURE — 97110 THERAPEUTIC EXERCISES: CPT

## 2023-10-13 NOTE — FLOWSHEET NOTE
Physical Therapy Daily Treatment Note    Date:  10/13/2023    Patient Name:  Brigid Gonzalez    :  1960  MRN: [id number]  Restrictions/Precautions:     Medical/Treatment Diagnosis Information:   Diagnosis: 1. Post-operative state Z98.890  2. Plantar fasciitis, right M72.2   3. Posterior tibial tendinitis, right M76.821  4. Pes planus of right foot D01.71  Insurance/Certification information:  PT Insurance Information: [facility]  Physician Information:   Gonzalez Humphrey MD  Plan of care signed (Y/N):  N  Visit# / total visits:  3/10 2nd POC 12 total   Pain level: 3/10       Time In: 11:40 am   Time Out: 12:18  am     Progress Note: [x]  Yes []  No  Next due by: Visit #10  or by 11/3/23    Subjective:  Patient arrived to therapy wearing shoe. Patient did not bring her boot. RTD: 10/17/23  Tentative RDW: 10/22/23 tentatively, but looking at more like November timeframe. Objective:  SHADE complete per flow chart to facilitate strength, motion and stability to allow ease with daily activities and eventual return to work. Verbal cuing for progression. Instructed patient on proper use of cane this date with practice required for proper sequencing. Updated HEP.      Observation:     Test measurements:       Exercises:   Exercise/Equipment Resistance/Repetitions Other comments        Bike/nustep 6' NUSTEP        Gastric/soleus Stretch           Ankle AROM/Circles  20x red    Ankle Alphabet  1x ed    BAPS  15x L2 standing Forward, sitting for remainder    Ankle isometrics 10x 3\" ea    skateboard 3'    Toe curls 20x    Seated HR/TR 20x    Prostretch 15x    4-way ankle Tband  HEP         Weight shifting  lateral and willa diagonals      Mini marches  20x Approx 100% WB   Squats  10x    3 way hip  10x Willa     HS curls  15x Willa    [x] Provided verbal/tactile cueing for activities related to strengthening, flexibility, endurance, ROM. (11982)  [] Provided verbal/tactile cueing for activities related to

## 2023-10-16 ENCOUNTER — HOSPITAL ENCOUNTER (OUTPATIENT)
Dept: PHYSICAL THERAPY | Age: 63
Setting detail: THERAPIES SERIES
Discharge: HOME | End: 2023-10-16
Payer: COMMERCIAL

## 2023-10-16 PROCEDURE — 97110 THERAPEUTIC EXERCISES: CPT

## 2023-10-16 NOTE — FLOWSHEET NOTE
activities related to improving balance, coordination, kinesthetic sense, posture, motor skill, proprioception. (49250)    Therapeutic Activities:     [] Therapeutic activities, direct (one-on-one) patient contact (use of dynamic activities to improve functional performance). (54424)    Gait:   [] Provided training and instruction to the patient for ambulation re-education. (78309)    Self-Care/ADL's  [] Self-care/home management training and compensatory training, meal preparation, safety procedures, and instructions in use of assistive technology devices/adaptive equipment, direct one-on-one contact. (93471)    Home Exercise Program:     [x] Reviewed/Progressed HEP activities related to strengthening, flexibility, endurance, ROM. (50977)  [] Reviewed/Progressed HEP activities related to improving balance, coordination, kinesthetic sense, posture, motor skill, proprioception.  (33536)    Manual Treatments:    [] Provided manual therapy to mobilize soft tissue/joints for the purpose of modulating pain, promoting relaxation,  increasing ROM, reducing/eliminating soft tissue swelling/inflammation/restriction, improving soft tissue extensibility. (05477)    Service Based Modalities:      Timed Code Treatment Minutes:  30' SHADE    Total Treatment Minutes:   30'    Treatment/Activity Tolerance:  [x] Patient tolerated treatment well [] Patient limited by fatique  [] Patient limited by pain  [] Patient limited by other medical complications  [] Other:     Prognosis: [x] Good [] Fair  [] Poor    Patient Requires Follow-up: [x] Yes  [] No      Goals:  Short Term Goals  Time Frame for Short Term Goals: 2 weeks  Short Term Goal 1: Initiate additional HEP Met (given)    Long Term Goals  Time Frame for Long Term Goals : 4 weeks  Long Term Goal 1: Patient will score > or = to 65/84 on the FAAM to allow patient to complete her daily ADLs with greater ease.  (See above)  Long Term Goal 2: Patient will demosntrate right ankle AROM to

## 2023-10-18 ENCOUNTER — HOSPITAL ENCOUNTER (OUTPATIENT)
Dept: PHYSICAL THERAPY | Age: 63
Setting detail: THERAPIES SERIES
Discharge: HOME | End: 2023-10-18
Payer: COMMERCIAL

## 2023-10-18 PROCEDURE — 97110 THERAPEUTIC EXERCISES: CPT

## 2023-10-18 NOTE — FLOWSHEET NOTE
3 way hip     HS curls     [x] Provided verbal/tactile cueing for activities related to strengthening, flexibility, endurance, ROM. (83436)  [] Provided verbal/tactile cueing for activities related to improving balance, coordination, kinesthetic sense, posture, motor skill, proprioception. (69588)    Therapeutic Activities:     [] Therapeutic activities, direct (one-on-one) patient contact (use of dynamic activities to improve functional performance). (25397)    Gait:   [] Provided training and instruction to the patient for ambulation re-education. (18052)    Self-Care/ADL's  [] Self-care/home management training and compensatory training, meal preparation, safety procedures, and instructions in use of assistive technology devices/adaptive equipment, direct one-on-one contact. (49961)    Home Exercise Program:     [x] Reviewed/Progressed HEP activities related to strengthening, flexibility, endurance, ROM. (00588)  [] Reviewed/Progressed HEP activities related to improving balance, coordination, kinesthetic sense, posture, motor skill, proprioception.  (96575)    Manual Treatments:    [] Provided manual therapy to mobilize soft tissue/joints for the purpose of modulating pain, promoting relaxation,  increasing ROM, reducing/eliminating soft tissue swelling/inflammation/restriction, improving soft tissue extensibility.  (32557)    Service Based Modalities:      Timed Code Treatment Minutes:  45' SHADE    Total Treatment Minutes:   45'    Treatment/Activity Tolerance:  [x] Patient tolerated treatment well [] Patient limited by fatique  [] Patient limited by pain  [] Patient limited by other medical complications  [] Other:     Prognosis: [x] Good [] Fair  [] Poor    Patient Requires Follow-up: [x] Yes  [] No      Goals:  Short Term Goals  Time Frame for Short Term Goals: 2 weeks  Short Term Goal 1: Initiate additional HEP Met (given)    Long Term Goals  Time Frame for Long Term Goals : 4 weeks  Long Term Goal 1:

## 2023-10-20 ENCOUNTER — HOSPITAL ENCOUNTER (OUTPATIENT)
Dept: PHYSICAL THERAPY | Age: 63
Setting detail: THERAPIES SERIES
Discharge: HOME | End: 2023-10-20
Payer: COMMERCIAL

## 2023-10-20 PROCEDURE — 97110 THERAPEUTIC EXERCISES: CPT

## 2023-10-20 NOTE — FLOWSHEET NOTE
Physical Therapy Daily Treatment Note    Date:  10/20/2023    Patient Name:  Wagner Hough    :  1960  MRN: [id number]  Restrictions/Precautions:     Medical/Treatment Diagnosis Information:   Diagnosis: 1. Post-operative state Z98.890  2. Plantar fasciitis, right M72.2   3. Posterior tibial tendinitis, right M76.821  4. Pes planus of right foot U78.78  Insurance/Certification information:  PT Insurance Information: [facility]  Physician Information:   Shravan Arana MD  Plan of care signed (Y/N):  N  Visit# / total visits:  6/10 2nd POC 15 total   Pain level: 3/10       Time In: 9:30 am   Time Out: 10:11 am     Progress Note: [x]  Yes []  No  Next due by: Visit #10  or by 11/3/23    Subjective: Patient reports she is getting a CT scan next week Wednesday. Not sleeping well. Tentative RDW: 10/22/23 tentatively, but looking at more like November timeframe. Objective:  SHADE complete per flow chart to facilitate strength, motion and stability to allow ease with daily activities and eventual return to work. Verbal cuing for progression. Discussed avoiding painful directions with exercises. Added counter exercises back in.      Observation:   Test measurements:       Exercises:   Exercise/Equipment Resistance/Repetitions Other comments        Bike/nustep 7' NUSTEP        Gastric/soleus Stretch           Ankle AROM/Circles  20x red    Ankle Alphabet  1x ed    BAPS  20x L2 standing Forward, sitting for remainder    Ankle isometrics 10x 3\" ea    skateboard 3'    Toe curls 20x    Seated HR/TR 20x    Prostretch 15x    4-way ankle Tband  HEP         Weight shifting  lateral and willa diagonals      Mini marches  20x    Squats  10x    3 way hip  10x Willa     HS curls  15x Willa    [x] Provided verbal/tactile cueing for activities related to strengthening, flexibility, endurance, ROM. (31654)  [] Provided verbal/tactile cueing for activities related to improving balance, coordination, kinesthetic sense,

## 2023-10-24 ENCOUNTER — HOSPITAL ENCOUNTER (OUTPATIENT)
Dept: PHYSICAL THERAPY | Age: 63
Setting detail: THERAPIES SERIES
Discharge: HOME | End: 2023-10-24
Payer: COMMERCIAL

## 2023-10-24 PROCEDURE — 97110 THERAPEUTIC EXERCISES: CPT

## 2023-10-24 NOTE — FLOWSHEET NOTE
Physical Therapy Daily Treatment Note    Date:  10/24/2023    Patient Name:  Sofia Montalvo    :  1960  MRN: [id number]  Restrictions/Precautions:     Medical/Treatment Diagnosis Information:   Diagnosis: 1. Post-operative state Z98.890  2. Plantar fasciitis, right M72.2   3. Posterior tibial tendinitis, right M76.821  4. Pes planus of right foot C13.45  Insurance/Certification information:  PT Insurance Information: [facility]  Physician Information:   Mally Coronado MD  Plan of care signed (Y/N):  N  Visit# / total visits:  7/10 2nd POC 16 total   Pain level: 3/10       Time In: 3:56 pm   Time Out: 4:34 pm     Progress Note: [x]  Yes []  No  Next due by: Visit #10  or by 11/3/23    Subjective: Patient reports her pain is low this date and notes the pain is different. Its more of a dull ache. CT scan tomorrow. Tentative RDW: 10/22/23 tentatively, but looking at more like November timeframe. Objective:  SHADE complete per flow chart to facilitate strength, motion and stability to allow ease with daily activities and eventual return to work. Verbal cuing for progression.       Observation:   Test measurements:       Exercises:   Exercise/Equipment Resistance/Repetitions Other comments        Bike/nustep 7' NUSTEP        Gastric/soleus Stretch           Ankle AROM/Circles  20x red    Ankle Alphabet  1x ed    BAPS  20x L2     Ankle isometrics 10x 3\" ea    skateboard 3'    Toe curls 20x    Seated HR/TR 20x    Prostretch 15x    4-way ankle Tband  HEP         Weight shifting  lateral and willa diagonals      Mini marches  20x    Squats  10x    3 way hip  10x Willa     HS curls  15x Willa    [x] Provided verbal/tactile cueing for activities related to strengthening, flexibility, endurance, ROM. (19728)  [] Provided verbal/tactile cueing for activities related to improving balance, coordination, kinesthetic sense, posture, motor skill, proprioception. (76318)    Therapeutic Activities:     []

## 2023-10-26 ENCOUNTER — HOSPITAL ENCOUNTER (OUTPATIENT)
Dept: PHYSICAL THERAPY | Age: 63
Setting detail: THERAPIES SERIES
Discharge: HOME | End: 2023-10-26
Payer: COMMERCIAL

## 2023-10-26 PROCEDURE — 97110 THERAPEUTIC EXERCISES: CPT

## 2023-10-26 NOTE — FLOWSHEET NOTE
Physical Therapy Daily Treatment Note    Date:  10/26/2023    Patient Name:  Kerry Morales    :  1960  MRN: [id number]  Restrictions/Precautions:     Medical/Treatment Diagnosis Information:   Diagnosis: 1. Post-operative state Z98.890  2. Plantar fasciitis, right M72.2   3. Posterior tibial tendinitis, right M76.821  4. Pes planus of right foot H66.40  Insurance/Certification information:  PT Insurance Information: [facility]  Physician Information:   Paula Barroso MD  Plan of care signed (Y/N):  N  Visit# / total visits:  8/10 2nd POC 17 total   Pain level: 3/10       Time In: 658 pm   Time Out: 738 pm     Progress Note: [x]  Yes []  No  Next due by: Visit #10  or by 11/3/23    Subjective: Patient arrived reporting 3/10 pain this morning. Patient reporting that she has been trying to walk at home without an AD but causing more pain. Return to work has been extended to 23. Tentative RDW: 23    Objective:  SHADE complete per flow chart to facilitate strength, motion and stability to allow ease with daily activities and eventual return to work. Verbal cuing for progression. Observation: Able to get to 3-4 minutes on the NuStep before increase in the pain.    Test measurements:     Right ankle AROM  DF - 8  PF - 30  Inversion - 22 deg  Eversion - 12 deg      Exercises:   Exercise/Equipment Resistance/Repetitions Other comments        Bike/nustep 7' NUSTEP        Gastric/soleus Stretch           Ankle AROM/Circles  20x red    Ankle Alphabet  1x ed    BAPS  20x L2     Ankle isometrics 10x 3\" ea    skateboard 3'    Toe curls 20x    Seated HR/TR 20x    Prostretch 15x    4-way ankle Tband  HEP         Weight shifting  lateral and willa diagonals      Mini marches  20x    Squats  10x    3 way hip  10x Willa     HS curls  15x Willa    [x] Provided verbal/tactile cueing for activities related to strengthening, flexibility, endurance, ROM. (75282)  [] Provided verbal/tactile cueing for

## 2023-10-30 RX ORDER — RIZATRIPTAN BENZOATE 10 MG/1
10 TABLET ORAL
Qty: 10 TABLET | Refills: 0 | OUTPATIENT
Start: 2023-10-30 | End: 2023-10-30

## 2023-10-30 NOTE — TELEPHONE ENCOUNTER
PT is going to call her neurologist who prescribed her the medication maxalt for a refill from  [address] [place] [address] and if she has any issues she will call our office back.

## 2023-10-31 ENCOUNTER — HOSPITAL ENCOUNTER (OUTPATIENT)
Dept: PHYSICAL THERAPY | Age: 63
Setting detail: THERAPIES SERIES
Discharge: HOME | End: 2023-10-31
Payer: COMMERCIAL

## 2023-10-31 PROCEDURE — 97110 THERAPEUTIC EXERCISES: CPT

## 2023-10-31 NOTE — FLOWSHEET NOTE
(43704)    Therapeutic Activities:     [] Therapeutic activities, direct (one-on-one) patient contact (use of dynamic activities to improve functional performance). (67626)    Gait:   [] Provided training and instruction to the patient for ambulation re-education. (10059)    Self-Care/ADL's  [] Self-care/home management training and compensatory training, meal preparation, safety procedures, and instructions in use of assistive technology devices/adaptive equipment, direct one-on-one contact. (26070)    Home Exercise Program:     [x] Reviewed/Progressed HEP activities related to strengthening, flexibility, endurance, ROM. (70157)  [] Reviewed/Progressed HEP activities related to improving balance, coordination, kinesthetic sense, posture, motor skill, proprioception.  (45216)    Manual Treatments:    [] Provided manual therapy to mobilize soft tissue/joints for the purpose of modulating pain, promoting relaxation,  increasing ROM, reducing/eliminating soft tissue swelling/inflammation/restriction, improving soft tissue extensibility. (79689)    Service Based Modalities:      Timed Code Treatment Minutes:  39' SHADE    Total Treatment Minutes:   39'    Treatment/Activity Tolerance:  [x] Patient tolerated treatment well [] Patient limited by fatique  [] Patient limited by pain  [] Patient limited by other medical complications  [] Other:     Prognosis: [x] Good [] Fair  [] Poor    Patient Requires Follow-up: [x] Yes  [] No      Goals:  Short Term Goals  Time Frame for Short Term Goals: 2 weeks  Short Term Goal 1: Initiate additional HEP Met (given)    Long Term Goals  Time Frame for Long Term Goals : 4 weeks  Long Term Goal 1: Patient will score > or = to 65/84 on the FAAM to allow patient to complete her daily ADLs with greater ease.  (See above)  Long Term Goal 2: Patient will demosntrate right ankle AROM to 10 deg DF, 38 deg PF, 20 deg inversion, and 15 deg eversion to allow patient to ambulate over uneven surfaces

## 2023-11-01 ENCOUNTER — TELEPHONE (OUTPATIENT)
Dept: FAMILY MEDICINE CLINIC | Age: 63
End: 2023-11-01

## 2023-11-01 DIAGNOSIS — Z12.31 VISIT FOR SCREENING MAMMOGRAM: Primary | ICD-10-CM

## 2023-11-02 ENCOUNTER — HOSPITAL ENCOUNTER (OUTPATIENT)
Dept: PHYSICAL THERAPY | Age: 63
Setting detail: THERAPIES SERIES
Discharge: HOME | End: 2023-11-02
Payer: COMMERCIAL

## 2023-11-02 PROCEDURE — 97110 THERAPEUTIC EXERCISES: CPT

## 2023-11-02 NOTE — FLOWSHEET NOTE
Physical Therapy Daily Treatment Note    Date:  2023    Patient Name:  Caitlin López    :  1960  MRN: [id number]  Restrictions/Precautions:     Medical/Treatment Diagnosis Information:   Diagnosis: 1. Post-operative state Z98.890  2. Plantar fasciitis, right M72.2   3. Posterior tibial tendinitis, right M76.821  4. Pes planus of right foot K07.38  Insurance/Certification information:  PT Insurance Information: [facility]  Physician Information:   Aiden Horner MD  Plan of care signed (Y/N):  N  Visit# / total visits:  10/10 2nd POC 19 total   Pain level: 3/10       Time In: 4:15 pm   Time Out: 4:53 pm     Progress Note: [x]  Yes []  No  Next due by: Visit #10  or by 11/3/23    Subjective: Notes ankle has improved however still receiving muscle spasms. Walks around the house no AD, still uses cane for long distances. Gets quite a bit of pain ambulating larger distances. Taking less pain meds now as well. States she had a CT scan but has not heard for the results. Tentative RTW: 23    Objective:  SHADE complete per flow chart to facilitate strength, motion and stability to allow ease with daily activities and eventual return to work. Verbal cuing for progression. Tolerated progressed exercises this date.      Observation:   Test measurements:     Right ankle AROM // MMT  DF - 8   // 4/5  PF - 30  // 4+/5  Inversion - 22 deg  // 4-/5  Eversion - 12 deg  //  3+/5 with pain    FAAM: 51/84      Exercises:   Exercise/Equipment Resistance/Repetitions Other comments        Bike/nustep 7' L2 NUSTEP        Gastric/soleus Stretch           Ankle AROM/Circles  15x green    Ankle Alphabet  1x green    BAPS  20x L2     Ankle isometrics 10x 3\" ea    skateboard     Toe curls 20x    Seated HR/TR 20x    Prostretch 15x    4-way ankle Tband  HEP    lunges 5x ea    Step up 2 inch x10   F/L   STS 10x    Mini marches  20x    Squats  10x    3 way hip  10x Ajit     HS curls  15x Ajit    [x] Provided

## 2023-11-07 ENCOUNTER — HOSPITAL ENCOUNTER (OUTPATIENT)
Dept: PHYSICAL THERAPY | Age: 63
Setting detail: THERAPIES SERIES
Discharge: HOME | End: 2023-11-07
Payer: COMMERCIAL

## 2023-11-07 PROCEDURE — 97110 THERAPEUTIC EXERCISES: CPT

## 2023-11-07 NOTE — FLOWSHEET NOTE
Physical Therapy Daily Treatment Note    Date:  2023    Patient Name:  Erich Drake    :  1960  MRN: [id number]  Restrictions/Precautions:     Medical/Treatment Diagnosis Information:   Diagnosis: 1. Post-operative state Z98.890  2. Plantar fasciitis, right M72.2   3. Posterior tibial tendinitis, right M76.821  4. Pes planus of right foot Y40.66  Insurance/Certification information:  PT Insurance Information: [facility]  Physician Information:   Jayant Otero MD  Plan of care signed (Y/N):  N  Visit# / total visits:  10/10 2nd POC 19 total   Pain level: 3/10       Time In: 1:00 pm   Time Out: 1:25 pm     Progress Note: [x]  Yes []  No  Next due by: Visit #10  or by 11/3/23    Subjective: Patient stated after last session she hurt so bad she could hardly move for a couple days. She just got called by her surgeons office stating she was going to be having surgery to remove hardware. She was also told she tore a tendon in her ankle and will find out Thursday if they need to operate. Tentative RTW: 23    Objective:  SHADE complete per flow chart to facilitate strength, motion and stability to allow ease with daily activities and eventual return to work. Verbal cuing for progression. Only performed exercises below until pateint hears back from surgeons office what they are going to do about torn tendon.    Observation:   Test measurements:     Right ankle AROM // MMT  DF - 8   // 4/5  PF - 30  // 4+/5  Inversion - 22 deg  // 4-/5  Eversion - 12 deg  //  3+/5 with pain    FAAM: 51/84      Exercises:   Exercise/Equipment Resistance/Repetitions Other comments        Bike/nustep 11' L2 NUSTEP        Gastric/soleus Stretch           Ankle AROM/Circles     Ankle Alphabet     BAPS     Ankle isometrics    skateboard    Toe curls    Seated HR/TR    Prostretch    4-way ankle Tband     lunges    Step up   F/L   STS 10x    Mini marches  20x    Squats  10x    3 way hip  10x Ajit     HS curls  15x

## 2023-11-08 ENCOUNTER — TELEPHONE (OUTPATIENT)
Dept: FAMILY MEDICINE CLINIC | Age: 63
End: 2023-11-08

## 2023-11-08 NOTE — TELEPHONE ENCOUNTER
Pt calling stating she is having surgery on the 29th and was just told today she needs a pre op, surgeon if faxing paperwork to our office, can you work in, please advise at above number, pt aware Beth Beltrants out of office today and will get a call back tomorrow.

## 2023-11-09 ENCOUNTER — APPOINTMENT (OUTPATIENT)
Dept: PHYSICAL THERAPY | Age: 63
End: 2023-11-09
Payer: COMMERCIAL

## 2023-11-09 ENCOUNTER — HOSPITAL ENCOUNTER (OUTPATIENT)
Dept: MAMMOGRAPHY | Age: 63
Discharge: HOME | End: 2023-11-11
Payer: COMMERCIAL

## 2023-11-09 VITALS — HEIGHT: 64 IN | WEIGHT: 182 LBS | BODY MASS INDEX: 31.07 KG/M2

## 2023-11-09 DIAGNOSIS — Z12.31 VISIT FOR SCREENING MAMMOGRAM: ICD-10-CM

## 2023-11-09 PROCEDURE — 77063 BREAST TOMOSYNTHESIS BI: CPT

## 2023-11-10 ENCOUNTER — OFFICE VISIT (OUTPATIENT)
Dept: FAMILY MEDICINE CLINIC | Age: 63
End: 2023-11-10
Payer: COMMERCIAL

## 2023-11-10 VITALS
WEIGHT: 192 LBS | BODY MASS INDEX: 32.78 KG/M2 | HEIGHT: 64 IN | DIASTOLIC BLOOD PRESSURE: 60 MMHG | SYSTOLIC BLOOD PRESSURE: 110 MMHG | HEART RATE: 68 BPM

## 2023-11-10 DIAGNOSIS — N32.81 OVERACTIVE BLADDER: ICD-10-CM

## 2023-11-10 DIAGNOSIS — F51.01 PRIMARY INSOMNIA: ICD-10-CM

## 2023-11-10 DIAGNOSIS — R53.83 OTHER FATIGUE: ICD-10-CM

## 2023-11-10 DIAGNOSIS — Z01.818 PRE-OPERATIVE CLEARANCE: Primary | ICD-10-CM

## 2023-11-10 DIAGNOSIS — Z86.73 HISTORY OF CVA (CEREBROVASCULAR ACCIDENT): ICD-10-CM

## 2023-11-10 DIAGNOSIS — M25.512 ACUTE PAIN OF LEFT SHOULDER: ICD-10-CM

## 2023-11-10 DIAGNOSIS — F41.9 ANXIETY: ICD-10-CM

## 2023-11-10 DIAGNOSIS — K21.9 GASTROESOPHAGEAL REFLUX DISEASE WITHOUT ESOPHAGITIS: ICD-10-CM

## 2023-11-10 PROCEDURE — 99214 OFFICE O/P EST MOD 30 MIN: CPT | Performed by: NURSE PRACTITIONER

## 2023-11-10 NOTE — PROGRESS NOTES
for recheck. Orders Placed This Encounter   Procedures    XR SHOULDER LEFT (MIN 2 VIEWS)     Standing Status:   Future     Standing Expiration Date:   11/10/2024     Order Specific Question:   Reason for exam:     Answer:   shoulder pain    CBC with Auto Differential     Standing Status:   Future     Standing Expiration Date:   11/10/2024    Comprehensive Metabolic Panel     Standing Status:   Future     Standing Expiration Date:   11/10/2024    Protime-INR     Standing Status:   Future     Standing Expiration Date:   11/10/2024     Order Specific Question:   Daily Coumadin Dose? Answer:   none    APTT     Standing Status:   Future     Standing Expiration Date:   11/10/2024     Order Specific Question:   Daily Heparin Dose? Answer:   none    TSH With Reflex Ft4     Standing Status:   Future     Standing Expiration Date:   11/10/2024    Vitamin D 25 Hydroxy     Standing Status:   Future     Standing Expiration Date:   11/10/2024     No orders of the defined types were placed in this encounter.         Electronically signed by STEF Dodson CNP on 11/12/2023 at 2:32 PM

## 2023-11-12 ASSESSMENT — ENCOUNTER SYMPTOMS
ABDOMINAL PAIN: 0
WHEEZING: 0
GLOBUS SENSATION: 0
CHOKING: 0
DIARRHEA: 0
COUGH: 0
BACK PAIN: 0
SORE THROAT: 0
CHEST TIGHTNESS: 0
BELCHING: 0
NAUSEA: 0
HEARTBURN: 0
SHORTNESS OF BREATH: 0
CONSTIPATION: 0
VOMITING: 0
ABDOMINAL DISTENTION: 0
HOARSE VOICE: 0
COLOR CHANGE: 0

## 2023-11-13 ENCOUNTER — HOSPITAL ENCOUNTER (OUTPATIENT)
Age: 63
Discharge: HOME | End: 2023-11-13
Payer: COMMERCIAL

## 2023-11-13 ENCOUNTER — HOSPITAL ENCOUNTER (OUTPATIENT)
Dept: GENERAL RADIOLOGY | Age: 63
Discharge: HOME | End: 2023-11-15
Payer: COMMERCIAL

## 2023-11-13 DIAGNOSIS — R53.83 OTHER FATIGUE: ICD-10-CM

## 2023-11-13 DIAGNOSIS — Z01.818 PRE-OPERATIVE CLEARANCE: ICD-10-CM

## 2023-11-13 DIAGNOSIS — M25.512 ACUTE PAIN OF LEFT SHOULDER: ICD-10-CM

## 2023-11-13 LAB
ALBUMIN SERPL-MCNC: 4.2 G/DL (ref 3.5–5.2)
ALBUMIN/GLOB SERPL: 1.9 {RATIO} (ref 1–2.5)
ALP SERPL-CCNC: 73 U/L (ref 35–104)
ALT SERPL-CCNC: 14 U/L (ref 5–33)
ANION GAP SERPL CALCULATED.3IONS-SCNC: 8 MMOL/L (ref 9–17)
AST SERPL-CCNC: 13 U/L
BASOPHILS # BLD: 0.05 K/UL (ref 0–0.2)
BASOPHILS NFR BLD: 1 % (ref 0–2)
BILIRUB SERPL-MCNC: 0.1 MG/DL (ref 0.3–1.2)
BUN SERPL-MCNC: 22 MG/DL (ref 8–23)
BUN/CREAT SERPL: 31 (ref 9–20)
CALCIUM SERPL-MCNC: 9.3 MG/DL (ref 8.6–10.4)
CHLORIDE SERPL-SCNC: 103 MMOL/L (ref 98–107)
CO2 SERPL-SCNC: 28 MMOL/L (ref 20–31)
CREAT SERPL-MCNC: 0.7 MG/DL (ref 0.5–0.9)
EOSINOPHIL # BLD: 0.15 K/UL (ref 0–0.44)
EOSINOPHILS RELATIVE PERCENT: 3 % (ref 1–4)
ERYTHROCYTE [DISTWIDTH] IN BLOOD BY AUTOMATED COUNT: 13.4 % (ref 11.8–14.4)
GFR SERPL CREATININE-BSD FRML MDRD: >60 ML/MIN/1.73M2
GLUCOSE SERPL-MCNC: 94 MG/DL (ref 70–99)
HCT VFR BLD AUTO: 40.5 % (ref 36.3–47.1)
HGB BLD-MCNC: 13 G/DL (ref 11.9–15.1)
IMM GRANULOCYTES # BLD AUTO: <0.03 K/UL (ref 0–0.3)
IMM GRANULOCYTES NFR BLD: 0 %
INR PPP: 1
LYMPHOCYTES NFR BLD: 2.1 K/UL (ref 1.1–3.7)
LYMPHOCYTES RELATIVE PERCENT: 35 % (ref 24–43)
MCH RBC QN AUTO: 29.4 PG (ref 25.2–33.5)
MCHC RBC AUTO-ENTMCNC: 32.1 G/DL (ref 25.2–33.5)
MCV RBC AUTO: 91.6 FL (ref 82.6–102.9)
MONOCYTES NFR BLD: 0.37 K/UL (ref 0.1–1.2)
MONOCYTES NFR BLD: 6 % (ref 3–12)
NEUTROPHILS NFR BLD: 55 % (ref 36–65)
NEUTS SEG NFR BLD: 3.29 K/UL (ref 1.5–8.1)
NRBC BLD-RTO: 0 PER 100 WBC
PARTIAL THROMBOPLASTIN TIME: 25.6 SEC (ref 23.9–33.8)
PLATELET # BLD AUTO: 278 K/UL (ref 138–453)
PMV BLD AUTO: 9 FL (ref 8.1–13.5)
POTASSIUM SERPL-SCNC: 4.7 MMOL/L (ref 3.7–5.3)
PROT SERPL-MCNC: 6.4 G/DL (ref 6.4–8.3)
PROTHROMBIN TIME: 12.5 SEC (ref 11.5–14.2)
RBC # BLD AUTO: 4.42 M/UL (ref 3.95–5.11)
SODIUM SERPL-SCNC: 139 MMOL/L (ref 135–144)
TSH SERPL DL<=0.05 MIU/L-ACNC: 0.56 UIU/ML (ref 0.3–5)
WBC OTHER # BLD: 6 K/UL (ref 3.5–11.3)

## 2023-11-13 PROCEDURE — 84443 ASSAY THYROID STIM HORMONE: CPT

## 2023-11-13 PROCEDURE — 85025 COMPLETE CBC W/AUTO DIFF WBC: CPT

## 2023-11-13 PROCEDURE — 80053 COMPREHEN METABOLIC PANEL: CPT

## 2023-11-13 PROCEDURE — 85610 PROTHROMBIN TIME: CPT

## 2023-11-13 PROCEDURE — 82306 VITAMIN D 25 HYDROXY: CPT

## 2023-11-13 PROCEDURE — 73030 X-RAY EXAM OF SHOULDER: CPT

## 2023-11-13 PROCEDURE — 36415 COLL VENOUS BLD VENIPUNCTURE: CPT

## 2023-11-13 PROCEDURE — 85730 THROMBOPLASTIN TIME PARTIAL: CPT

## 2023-11-14 ENCOUNTER — APPOINTMENT (OUTPATIENT)
Dept: PHYSICAL THERAPY | Age: 63
End: 2023-11-14
Payer: COMMERCIAL

## 2023-11-14 LAB — 25(OH)D3 SERPL-MCNC: 60.8 NG/ML (ref 30–100)

## 2023-11-15 ENCOUNTER — TELEPHONE (OUTPATIENT)
Dept: FAMILY MEDICINE CLINIC | Age: 63
End: 2023-11-15

## 2023-11-15 NOTE — TELEPHONE ENCOUNTER
Pt stopped by the window and wanted laura to know that she is having a lot of left shoulder pain and has tried ice and heat and states she thinks using the cane is making it worse. She did have an xray done on Monday and she was given a muscle relaxer by her podiatrist but it does not help with her shoulder pain. She is wondering if she could get some medications to help with the pain in her shoulder. Pt is aware laura  is out today and can wait until tomorrow to have laura address this and to have her call her tomorrow.      RX [facility]

## 2023-11-16 ENCOUNTER — TELEPHONE (OUTPATIENT)
Dept: CARDIOLOGY | Age: 63
End: 2023-11-16

## 2023-11-16 ENCOUNTER — APPOINTMENT (OUTPATIENT)
Dept: PHYSICAL THERAPY | Age: 63
End: 2023-11-16
Payer: COMMERCIAL

## 2023-11-16 ENCOUNTER — TELEPHONE (OUTPATIENT)
Dept: FAMILY MEDICINE CLINIC | Age: 63
End: 2023-11-16

## 2023-11-16 DIAGNOSIS — M25.512 ACUTE PAIN OF LEFT SHOULDER: Primary | ICD-10-CM

## 2023-11-16 NOTE — TELEPHONE ENCOUNTER
Received cardiac clearance request from [facility] for Rt Ankle Hardware Removal 11/29/23. Please review attached clearance request and advise.      Last Appt:  5/18/2023  Next Appt:   Visit date not found  Med verified in [id number] [place] [address] 15

## 2023-11-20 ENCOUNTER — HOSPITAL ENCOUNTER (OUTPATIENT)
Dept: PHYSICAL THERAPY | Age: 63
Setting detail: THERAPIES SERIES
Discharge: HOME | End: 2023-11-20
Payer: COMMERCIAL

## 2023-11-20 DIAGNOSIS — M25.512 ACUTE PAIN OF LEFT SHOULDER: Primary | ICD-10-CM

## 2023-11-20 DIAGNOSIS — K21.9 GASTROESOPHAGEAL REFLUX DISEASE WITHOUT ESOPHAGITIS: ICD-10-CM

## 2023-11-20 RX ORDER — FAMOTIDINE 40 MG/1
40 TABLET, FILM COATED ORAL DAILY
Qty: 90 TABLET | Refills: 1 | Status: SHIPPED | OUTPATIENT
Start: 2023-11-20

## 2023-11-20 NOTE — TELEPHONE ENCOUNTER
Last Appt:  11/10/2023  Next Appt:   5/10/2024  Med verified in [address] [place] [address] 15

## 2023-11-20 NOTE — PROGRESS NOTES
Physical Therapy  Outpatient Physical Therapy    [x] [place]  Phone: [phone]  Fax: [phone]      [] [place]  Phone: [phone]  Fax: [phone]    Physical Therapy  Cancellation/No-show Note  Patient Name:  Violet Camarena  :  1960   Date:  2023  Cancelled visits to date: 1 - for shoulder   No-shows to date: 0    For today's appointment patient:  [x]  Cancelled  []  Rescheduled appointment  []  No-show     Reason given by patient:  [x]  Patient ill  []  Conflicting appointment  []  No transportation    []  Conflict with work  []  No reason given  []  Other:     Comments:      Electronically signed by: Momo Childers PT

## 2023-11-28 ENCOUNTER — APPOINTMENT (OUTPATIENT)
Dept: PHYSICAL THERAPY | Age: 63
End: 2023-11-28
Payer: COMMERCIAL

## 2024-02-05 ENCOUNTER — PATIENT MESSAGE (OUTPATIENT)
Dept: FAMILY MEDICINE CLINIC | Age: 64
End: 2024-02-05

## 2024-02-05 NOTE — TELEPHONE ENCOUNTER
From: Billie Kendrick  To: Gaby Patterson  Sent: 2/5/2024 11:32 AM EST  Subject: Patient    Extreme continued pain on left shoulder! Did at home therapy myself.  Chiropractor visits...and he suggested exrays. I told him I had an exray and he then suggested an   M.R.I.

## 2024-02-06 ENCOUNTER — OFFICE VISIT (OUTPATIENT)
Dept: FAMILY MEDICINE CLINIC | Age: 64
End: 2024-02-06

## 2024-02-06 VITALS
WEIGHT: 194 LBS | BODY MASS INDEX: 33.12 KG/M2 | HEART RATE: 72 BPM | DIASTOLIC BLOOD PRESSURE: 70 MMHG | HEIGHT: 64 IN | SYSTOLIC BLOOD PRESSURE: 110 MMHG

## 2024-02-06 DIAGNOSIS — M25.612 DECREASED RANGE OF MOTION OF LEFT SHOULDER: ICD-10-CM

## 2024-02-06 DIAGNOSIS — M25.512 ACUTE PAIN OF LEFT SHOULDER: Primary | ICD-10-CM

## 2024-02-06 RX ORDER — METHYLPREDNISOLONE ACETATE 80 MG/ML
80 INJECTION, SUSPENSION INTRA-ARTICULAR; INTRALESIONAL; INTRAMUSCULAR; SOFT TISSUE ONCE
Status: DISCONTINUED | OUTPATIENT
Start: 2024-02-06 | End: 2024-02-06

## 2024-02-06 RX ORDER — HYDROCODONE BITARTRATE AND ACETAMINOPHEN 5; 325 MG/1; MG/1
1 TABLET ORAL EVERY 6 HOURS PRN
COMMUNITY
Start: 2023-12-09

## 2024-02-06 RX ORDER — CYCLOBENZAPRINE HCL 10 MG
10 TABLET ORAL 3 TIMES DAILY PRN
Qty: 60 TABLET | Refills: 1 | Status: SHIPPED | OUTPATIENT
Start: 2024-02-06

## 2024-02-06 RX ORDER — METHYLPREDNISOLONE ACETATE 40 MG/ML
80 INJECTION, SUSPENSION INTRA-ARTICULAR; INTRALESIONAL; INTRAMUSCULAR; SOFT TISSUE ONCE
Status: COMPLETED | OUTPATIENT
Start: 2024-02-06 | End: 2024-02-06

## 2024-02-06 RX ADMIN — METHYLPREDNISOLONE ACETATE 80 MG: 40 INJECTION, SUSPENSION INTRA-ARTICULAR; INTRALESIONAL; INTRAMUSCULAR; SOFT TISSUE at 12:18

## 2024-02-06 ASSESSMENT — ENCOUNTER SYMPTOMS
VOMITING: 0
BACK PAIN: 0
DIARRHEA: 0
COLOR CHANGE: 0
CHEST TIGHTNESS: 0
ABDOMINAL PAIN: 0
SHORTNESS OF BREATH: 0
NAUSEA: 0
ABDOMINAL DISTENTION: 0
COUGH: 0
WHEEZING: 0
CONSTIPATION: 0

## 2024-02-06 ASSESSMENT — PATIENT HEALTH QUESTIONNAIRE - PHQ9
SUM OF ALL RESPONSES TO PHQ QUESTIONS 1-9: 0
SUM OF ALL RESPONSES TO PHQ9 QUESTIONS 1 & 2: 0
2. FEELING DOWN, DEPRESSED OR HOPELESS: 0
SUM OF ALL RESPONSES TO PHQ QUESTIONS 1-9: 0
1. LITTLE INTEREST OR PLEASURE IN DOING THINGS: 0

## 2024-02-06 NOTE — PROGRESS NOTES
[facility] A DEPARTMENT OF [facility]  [address] [facility] [address]  Dept: [phone]  Dept Fax: [phone]  Loc: [phone]    Billie Kendrick is a 63 y.o. female who presents today for her medical conditions/complaintsas noted below.  Billie Kendrick is c/o of   Chief Complaint   Patient presents with    Shoulder Pain     Pain several months has seen chiropractor and doing home exercises left shpulder     HPI:     Patient here for continued shoulder pain. At last appointment had reported having some left shoulder pain. At that time imagining was completed - which showed no evidence of fracture or arthritis. Was referred to PT - which patient never completed, cancelled the only visit that had been scheduled. Continues to have left shoulder pain. Started several months ago. Feels like it is worsening. C/o left shoulder pain. Denies any injury or trauma, numbness, tingling, radiation of pain. Movement makes worse. Worse at night if laying on shoulder. Nothing is making better. Has been using pain medication that she got after foot surgery, muscle relaxer, chiropractor with no relief. Also reports to doing home exercises that her  had been given for himself. Reports she is supposed to return to work on 2/19 as an  at [employer]. Has been off work for 7 months due to her foot.         Hemoglobin A1C (%)   Date Value   04/15/2023 5.7   03/30/2022 5.6   07/12/2021 5.9             ( goal A1Cis < 7)   No components found for: \"LABMICR\"  LDL Cholesterol (mg/dL)   Date Value   04/15/2023 81   03/30/2022 106   07/12/2021 98       (goal LDL is <100)   AST (U/L)   Date Value   11/13/2023 13     ALT (U/L)   Date Value   11/13/2023 14     BUN (mg/dL)   Date Value   11/13/2023 22     BP Readings from Last 3 Encounters:   02/06/24 110/70   11/10/23 110/60   10/11/23 (!) 115/57          (goal 120/80)    Past Medical History:   Diagnosis Date

## 2024-02-16 ENCOUNTER — HOSPITAL ENCOUNTER (OUTPATIENT)
Dept: MRI IMAGING | Age: 64
Discharge: HOME | End: 2024-02-16
Payer: COMMERCIAL

## 2024-02-16 DIAGNOSIS — M25.512 ACUTE PAIN OF LEFT SHOULDER: ICD-10-CM

## 2024-02-16 DIAGNOSIS — M25.612 DECREASED RANGE OF MOTION OF LEFT SHOULDER: ICD-10-CM

## 2024-02-16 PROCEDURE — 73221 MRI JOINT UPR EXTREM W/O DYE: CPT

## 2024-03-14 DIAGNOSIS — I63.9 CEREBROVASCULAR ACCIDENT (CVA), UNSPECIFIED MECHANISM (HCC): ICD-10-CM

## 2024-03-14 RX ORDER — CLOPIDOGREL BISULFATE 75 MG/1
75 TABLET ORAL DAILY
Qty: 90 TABLET | Refills: 1 | Status: SHIPPED | OUTPATIENT
Start: 2024-03-14

## 2024-03-31 DIAGNOSIS — F43.21 GRIEF REACTION: ICD-10-CM

## 2024-04-01 RX ORDER — ESCITALOPRAM OXALATE 10 MG/1
10 TABLET ORAL DAILY
Qty: 10 TABLET | Refills: 0 | Status: SHIPPED | OUTPATIENT
Start: 2024-04-01

## 2024-04-01 RX ORDER — ESCITALOPRAM OXALATE 10 MG/1
10 TABLET ORAL DAILY
Qty: 90 TABLET | Refills: 1 | Status: SHIPPED | OUTPATIENT
Start: 2024-04-01 | End: 2024-04-01 | Stop reason: SDUPTHER

## 2024-04-02 ENCOUNTER — TELEPHONE (OUTPATIENT)
Dept: FAMILY MEDICINE CLINIC | Age: 64
End: 2024-04-02

## 2024-04-02 DIAGNOSIS — K21.9 LPRD (LARYNGOPHARYNGEAL REFLUX DISEASE): ICD-10-CM

## 2024-04-02 DIAGNOSIS — K21.9 GASTROESOPHAGEAL REFLUX DISEASE, UNSPECIFIED WHETHER ESOPHAGITIS PRESENT: ICD-10-CM

## 2024-04-02 RX ORDER — RABEPRAZOLE SODIUM 20 MG/1
20 TABLET, DELAYED RELEASE ORAL DAILY
Qty: 10 TABLET | Refills: 0 | Status: SHIPPED | OUTPATIENT
Start: 2024-04-02

## 2024-04-02 NOTE — TELEPHONE ENCOUNTER
Med verified in Epic last filled on 03/31/2024 through "[employer] mail service.  Spoke with patient and would like sent to [facility]    Billie called requesting a refill of the below medication which has been pended for you:     Requested Prescriptions     Pending Prescriptions Disp Refills    RABEprazole (ACIPHEX) 20 MG tablet 10 tablet 0     Sig: Take 1 tablet by mouth daily       Last Appointment Date: 2/6/2024  Next Appointment Date: 5/17/2024    Allergies   Allergen Reactions    Tape [Adhesive Tape] Rash

## 2024-04-02 NOTE — TELEPHONE ENCOUNTER
Patient is calling requesting a 10 day supply of her rabeprazole. She has a script sent into [facility] but they wont have it to her in time and she doesn't want to miss her medication. Please advise.

## 2024-04-04 ENCOUNTER — OFFICE VISIT (OUTPATIENT)
Dept: FAMILY MEDICINE CLINIC | Age: 64
End: 2024-04-04
Payer: COMMERCIAL

## 2024-04-04 VITALS
DIASTOLIC BLOOD PRESSURE: 70 MMHG | HEIGHT: 64 IN | WEIGHT: 197 LBS | HEART RATE: 80 BPM | BODY MASS INDEX: 33.63 KG/M2 | SYSTOLIC BLOOD PRESSURE: 122 MMHG

## 2024-04-04 DIAGNOSIS — L20.9 ATOPIC DERMATITIS, UNSPECIFIED TYPE: Primary | ICD-10-CM

## 2024-04-04 PROCEDURE — 99213 OFFICE O/P EST LOW 20 MIN: CPT | Performed by: NURSE PRACTITIONER

## 2024-04-04 RX ORDER — TRAMADOL HYDROCHLORIDE 50 MG/1
TABLET ORAL
COMMUNITY
Start: 2024-03-02

## 2024-04-04 RX ORDER — RABEPRAZOLE SODIUM 20 MG/1
20 TABLET, DELAYED RELEASE ORAL DAILY
Qty: 90 TABLET | Refills: 1 | Status: SHIPPED | OUTPATIENT
Start: 2024-04-04

## 2024-04-04 RX ORDER — PREDNISONE 20 MG/1
40 TABLET ORAL DAILY
Qty: 10 TABLET | Refills: 0 | Status: SHIPPED | OUTPATIENT
Start: 2024-04-04 | End: 2024-04-09

## 2024-04-04 RX ORDER — MELOXICAM 15 MG/1
15 TABLET ORAL DAILY
COMMUNITY
Start: 2024-03-29 | End: 2024-07-27

## 2024-04-04 SDOH — ECONOMIC STABILITY: INCOME INSECURITY: HOW HARD IS IT FOR YOU TO PAY FOR THE VERY BASICS LIKE FOOD, HOUSING, MEDICAL CARE, AND HEATING?: NOT VERY HARD

## 2024-04-04 SDOH — ECONOMIC STABILITY: HOUSING INSECURITY
IN THE LAST 12 MONTHS, WAS THERE A TIME WHEN YOU DID NOT HAVE A STEADY PLACE TO SLEEP OR SLEPT IN A SHELTER (INCLUDING NOW)?: NO

## 2024-04-04 SDOH — ECONOMIC STABILITY: FOOD INSECURITY: WITHIN THE PAST 12 MONTHS, THE FOOD YOU BOUGHT JUST DIDN'T LAST AND YOU DIDN'T HAVE MONEY TO GET MORE.: NEVER TRUE

## 2024-04-04 SDOH — ECONOMIC STABILITY: FOOD INSECURITY: WITHIN THE PAST 12 MONTHS, YOU WORRIED THAT YOUR FOOD WOULD RUN OUT BEFORE YOU GOT MONEY TO BUY MORE.: NEVER TRUE

## 2024-04-04 ASSESSMENT — ENCOUNTER SYMPTOMS
ABDOMINAL PAIN: 0
COUGH: 0
NAUSEA: 0
CONSTIPATION: 0
CHEST TIGHTNESS: 0
COLOR CHANGE: 0
SHORTNESS OF BREATH: 0
ABDOMINAL DISTENTION: 0
ROS SKIN COMMENTS: ITCHING
VOMITING: 0
DIARRHEA: 0
WHEEZING: 0

## 2024-04-04 NOTE — PROGRESS NOTES
[facility] A DEPART[facility] OF [facility]  [address] [facility] [address]  Dept: [phone]  Dept Fax: [phone]  Loc: [phone]    Billie Kendrick is a 63 y.o. female who presents today for her medical conditions/complaintsas noted below.  Billie Kendrick is c/o of   Chief Complaint   Patient presents with    Rash     On face times 3  weeks itching      HPI:     Patient here for a rash. Symptoms started about 3 weeks ago. Worsening. C/o rash - neck, face. Reports rash to itch. Denies known exposure to anything specific. Denies any new cleaning or hygiene products. Denies any other symptoms. Nothing specific is making better or worse. Treating with benadryl with mild relief.         Hemoglobin A1C (%)   Date Value   04/15/2023 5.7   03/30/2022 5.6   07/12/2021 5.9             ( goal A1Cis < 7)   No components found for: \"LABMICR\"  LDL Cholesterol (mg/dL)   Date Value   04/15/2023 81   03/30/2022 106   07/12/2021 98       (goal LDL is <100)   AST (U/L)   Date Value   11/13/2023 13     ALT (U/L)   Date Value   11/13/2023 14     BUN (mg/dL)   Date Value   11/13/2023 22     BP Readings from Last 3 Encounters:   04/04/24 122/70   02/06/24 110/70   11/10/23 110/60          (goal 120/80)    Past Medical History:   Diagnosis Date    Anxiety     Cerebrovascular disease     Mini stroke in 2006    CVA (cerebral infarction)     Facial weakness     left    Family history of colon cancer     Gastroesophageal reflux disease without esophagitis 5/31/2016    History of TIAs     Hypoglycemia     Left hemiparesis (HCC)     Memory problem     Migraine     Sleep difficulties     Speech abnormality     Tobacco abuse     Unspecified sleep apnea       Past Surgical History:   Procedure Laterality Date    APPENDECTOMY      CARDIAC CATHETERIZATION  7-2-15    nml    CHOLECYSTECTOMY      COLONOSCOPY  4/29/2015    2 polyps, sigmoid diverticulosis    FOOT SURGERY Right

## 2024-04-11 ENCOUNTER — PATIENT MESSAGE (OUTPATIENT)
Dept: FAMILY MEDICINE CLINIC | Age: 64
End: 2024-04-11

## 2024-04-11 NOTE — TELEPHONE ENCOUNTER
From: Billie Kendrick  To: Gaby Patterson  Sent: 4/11/2024 12:15 PM EDT  Subject: Gaby Patterson    Meds gave a little relief but rash is back plus has spread .

## 2024-04-12 RX ORDER — NYSTATIN 100000 U/G
CREAM TOPICAL
Qty: 30 G | Refills: 0 | Status: SHIPPED | OUTPATIENT
Start: 2024-04-12

## 2024-04-29 ENCOUNTER — HOSPITAL ENCOUNTER (OUTPATIENT)
Dept: GENERAL RADIOLOGY | Age: 64
Discharge: HOME | End: 2024-05-01
Payer: COMMERCIAL

## 2024-04-29 DIAGNOSIS — S93.601A RIGHT FOOT SPRAIN, INITIAL ENCOUNTER: ICD-10-CM

## 2024-04-29 PROCEDURE — 73630 X-RAY EXAM OF FOOT: CPT

## 2024-05-19 DIAGNOSIS — K21.9 GASTROESOPHAGEAL REFLUX DISEASE WITHOUT ESOPHAGITIS: ICD-10-CM

## 2024-05-20 RX ORDER — FAMOTIDINE 40 MG/1
40 TABLET, FILM COATED ORAL DAILY
Qty: 90 TABLET | Refills: 1 | Status: SHIPPED | OUTPATIENT
Start: 2024-05-20

## 2024-06-13 NOTE — PROGRESS NOTES
Dermatology Patient Note  [facility], [facility]  [facility] A DEPARTMENT OF [facility]  [address] [facility] [address]  Dept: [phone]  Dept Fax: [phone]      VISITDATE: 6/14/2024   REFERRING PROVIDER: No ref. provider found      Billie Kendrick is a 63 y.o. female  who presents today in the office for:    Other (Skin check- face)      HISTORY OF PRESENT ILLNESS:  Patient presents to the office today as a new patient to establish care. She is present for a skin check.  She is concerned with an asymptomatic brown lesion on her left cheek, which has been present x months.  No known changes.  Patient's mother had a melanoma removed and has advised pt to get this lesions checked.    MEDICAL PROBLEMS:  Patient Active Problem List    Diagnosis Date Noted    Headache 03/26/2022    Stroke-like episode     Squamous blepharitis of upper and lower eyelids of both eyes 08/23/2019    Hordeolum externum of right upper eyelid 01/31/2019    Preseptal cellulitis of right upper eyelid 01/31/2019    Gastroesophageal reflux disease without esophagitis 05/31/2016    Left hemiparesis (HCC)     Facial weakness      left      Memory problem     Anxiety     Sleep difficulties     VA (obstructive sleep apnea)     Tobacco abuse     Family history of colon cancer     CVA (cerebral vascular accident) (HCC) 07/14/2014    Hx-TIA (transient ischemic attack) 07/14/2014    Migraine 07/14/2014       CURRENT MEDICATIONS:   Current Outpatient Medications   Medication Sig Dispense Refill    famotidine (PEPCID) 40 MG tablet TAKE 1 TABLET DAILY 90 tablet 1    nystatin (MYCOSTATIN) 199307 UNIT/GM cream Apply topically 2 times daily. 30 g 0    meloxicam (MOBIC) 15 MG tablet Take 1 tablet by mouth daily      traMADol (ULTRAM) 50 MG tablet TAKE 1 TABLET BY MOUTH EVERY 6 HOURS FOR 5 DAYS      RABEprazole (ACIPHEX) 20 MG tablet Take 1 tablet by mouth daily 90 tablet 1

## 2024-06-14 ENCOUNTER — OFFICE VISIT (OUTPATIENT)
Dept: DERMATOLOGY | Age: 64
End: 2024-06-14
Payer: COMMERCIAL

## 2024-06-14 VITALS
WEIGHT: 200 LBS | HEIGHT: 64 IN | HEART RATE: 76 BPM | DIASTOLIC BLOOD PRESSURE: 84 MMHG | RESPIRATION RATE: 16 BRPM | SYSTOLIC BLOOD PRESSURE: 124 MMHG | BODY MASS INDEX: 34.15 KG/M2

## 2024-06-14 DIAGNOSIS — L91.8 ACROCHORDON: ICD-10-CM

## 2024-06-14 DIAGNOSIS — L82.1 SEBORRHEIC KERATOSES: Primary | ICD-10-CM

## 2024-06-14 PROCEDURE — 99203 OFFICE O/P NEW LOW 30 MIN: CPT | Performed by: PHYSICIAN ASSISTANT

## 2024-06-30 ENCOUNTER — OFFICE VISIT (OUTPATIENT)
Dept: PRIMARY CARE CLINIC | Age: 64
End: 2024-06-30

## 2024-06-30 VITALS
SYSTOLIC BLOOD PRESSURE: 128 MMHG | TEMPERATURE: 98 F | BODY MASS INDEX: 34.83 KG/M2 | WEIGHT: 204 LBS | DIASTOLIC BLOOD PRESSURE: 80 MMHG | HEART RATE: 88 BPM | HEIGHT: 64 IN | OXYGEN SATURATION: 98 % | RESPIRATION RATE: 16 BRPM

## 2024-06-30 DIAGNOSIS — H60.91 OTITIS EXTERNA OF RIGHT EAR, UNSPECIFIED CHRONICITY, UNSPECIFIED TYPE: Primary | ICD-10-CM

## 2024-06-30 RX ORDER — CIPROFLOXACIN AND DEXAMETHASONE 3; 1 MG/ML; MG/ML
4 SUSPENSION/ DROPS AURICULAR (OTIC) 2 TIMES DAILY
Qty: 1 EACH | Refills: 0 | Status: SHIPPED | OUTPATIENT
Start: 2024-06-30 | End: 2024-07-07

## 2024-07-07 DIAGNOSIS — N32.81 OVERACTIVE BLADDER: ICD-10-CM

## 2024-07-08 RX ORDER — SOLIFENACIN SUCCINATE 10 MG/1
10 TABLET, FILM COATED ORAL DAILY
Qty: 90 TABLET | Refills: 1 | Status: SHIPPED | OUTPATIENT
Start: 2024-07-08

## 2024-07-22 ENCOUNTER — TELEPHONE (OUTPATIENT)
Dept: FAMILY MEDICINE CLINIC | Age: 64
End: 2024-07-22

## 2024-07-22 DIAGNOSIS — Z13.6 SCREENING FOR CARDIOVASCULAR CONDITION: Primary | ICD-10-CM

## 2024-07-22 DIAGNOSIS — R73.03 PREDIABETES: ICD-10-CM

## 2024-07-22 NOTE — TELEPHONE ENCOUNTER
Pt calling stating she usually has labs but pt can now get them at her work, pt questions if you can put in orders and print out so she can take them to work to have her labs done there, pt will  at  when ready.

## 2024-08-05 LAB
ALBUMIN: 4.3 G/DL
ALP BLD-CCNC: 61 U/L
ALT SERPL-CCNC: 17 U/L
ANION GAP SERPL CALCULATED.3IONS-SCNC: ABNORMAL MMOL/L
AST SERPL-CCNC: 15 U/L
BILIRUB SERPL-MCNC: 0.2 MG/DL (ref 0.1–1.4)
BUN BLDV-MCNC: 22 MG/DL
CALCIUM SERPL-MCNC: 9.2 MG/DL
CHLORIDE BLD-SCNC: 103 MMOL/L
CHOLESTEROL, TOTAL: 176 MG/DL
CHOLESTEROL/HDL RATIO: 3
CO2: 25 MMOL/L
CREAT SERPL-MCNC: 0.8 MG/DL
ESTIMATED AVERAGE GLUCOSE: 114
GFR, ESTIMATED: ABNORMAL
GLUCOSE BLD-MCNC: 91 MG/DL
HBA1C MFR BLD: 5.6 %
HDLC SERPL-MCNC: 54 MG/DL (ref 35–70)
LDL CHOLESTEROL: 101
NONHDLC SERPL-MCNC: ABNORMAL MG/DL
POTASSIUM SERPL-SCNC: 4.9 MMOL/L
SODIUM BLD-SCNC: 140 MMOL/L
TOTAL PROTEIN: 6.2 G/DL (ref 6.4–8.2)
TRIGL SERPL-MCNC: 110 MG/DL
VLDLC SERPL CALC-MCNC: 22 MG/DL

## 2024-08-19 ENCOUNTER — OFFICE VISIT (OUTPATIENT)
Dept: FAMILY MEDICINE CLINIC | Age: 64
End: 2024-08-19
Payer: COMMERCIAL

## 2024-08-19 VITALS
WEIGHT: 201.4 LBS | BODY MASS INDEX: 34.38 KG/M2 | OXYGEN SATURATION: 96 % | DIASTOLIC BLOOD PRESSURE: 76 MMHG | HEART RATE: 83 BPM | HEIGHT: 64 IN | SYSTOLIC BLOOD PRESSURE: 124 MMHG

## 2024-08-19 DIAGNOSIS — Z86.73 HISTORY OF CVA (CEREBROVASCULAR ACCIDENT): Primary | ICD-10-CM

## 2024-08-19 DIAGNOSIS — R63.5 WEIGHT GAIN: ICD-10-CM

## 2024-08-19 DIAGNOSIS — R73.03 PREDIABETES: ICD-10-CM

## 2024-08-19 DIAGNOSIS — F51.01 PRIMARY INSOMNIA: ICD-10-CM

## 2024-08-19 DIAGNOSIS — E66.09 CLASS 1 OBESITY DUE TO EXCESS CALORIES WITH SERIOUS COMORBIDITY AND BODY MASS INDEX (BMI) OF 34.0 TO 34.9 IN ADULT: ICD-10-CM

## 2024-08-19 DIAGNOSIS — F41.9 ANXIETY: ICD-10-CM

## 2024-08-19 DIAGNOSIS — N32.81 OVERACTIVE BLADDER: ICD-10-CM

## 2024-08-19 DIAGNOSIS — K21.9 GASTROESOPHAGEAL REFLUX DISEASE WITHOUT ESOPHAGITIS: ICD-10-CM

## 2024-08-19 PROCEDURE — 99214 OFFICE O/P EST MOD 30 MIN: CPT | Performed by: NURSE PRACTITIONER

## 2024-08-19 RX ORDER — MAGNESIUM OXIDE 400 MG/1
400 TABLET ORAL DAILY
COMMUNITY

## 2024-08-19 RX ORDER — TIRZEPATIDE 2.5 MG/.5ML
2.5 INJECTION, SOLUTION SUBCUTANEOUS WEEKLY
Qty: 2 ML | Refills: 0 | Status: SHIPPED | OUTPATIENT
Start: 2024-08-19

## 2024-08-19 RX ORDER — CLOPIDOGREL BISULFATE 75 MG/1
75 TABLET ORAL DAILY
Qty: 90 TABLET | Refills: 1 | Status: SHIPPED | OUTPATIENT
Start: 2024-08-19

## 2024-08-19 NOTE — PROGRESS NOTES
mood, self-injury, sleep disturbance and suicidal ideas. The patient is not nervous/anxious.        Objective:     Physical Exam  Vitals and nursing note reviewed.   Constitutional:       General: She is awake. She is not in acute distress.     Appearance: Normal appearance. She is well-developed and well-groomed. She is not ill-appearing, toxic-appearing or diaphoretic.   Cardiovascular:      Rate and Rhythm: Normal rate and regular rhythm.      Heart sounds: Normal heart sounds, S1 normal and S2 normal.   Pulmonary:      Effort: Pulmonary effort is normal. No respiratory distress.      Breath sounds: Normal breath sounds and air entry. No decreased air movement. No decreased breath sounds, wheezing or rhonchi.   Abdominal:      General: There is no distension.      Palpations: Abdomen is soft.      Tenderness: There is no abdominal tenderness.   Musculoskeletal:      Right lower leg: No edema.      Left lower leg: No edema.   Skin:     General: Skin is warm and dry.   Neurological:      Mental Status: She is alert and oriented to person, place, and time.   Psychiatric:         Attention and Perception: Attention and perception normal.         Mood and Affect: Mood and affect normal.         Speech: Speech normal.         Behavior: Behavior normal. Behavior is cooperative.         Thought Content: Thought content normal.         Cognition and Memory: Cognition and memory normal.         Judgment: Judgment normal.       /76 (Site: Left Upper Arm, Position: Sitting, Cuff Size: Medium Adult)   Pulse 83   Ht 1.626 m (5' 4\")   Wt 91.4 kg (201 lb 6.4 oz)   SpO2 96%   BMI 34.57 kg/m²     Assessment:       Diagnosis Orders   1. History of CVA (cerebrovascular accident)  clopidogrel (PLAVIX) 75 MG tablet      2. Anxiety        3. Overactive bladder        4. Primary insomnia        5. Gastroesophageal reflux disease without esophagitis        6. Prediabetes        7. Weight gain  TSH With Reflex Ft4    Cortisol

## 2024-08-20 ENCOUNTER — TELEPHONE (OUTPATIENT)
Dept: FAMILY MEDICINE CLINIC | Age: 64
End: 2024-08-20

## 2024-08-20 DIAGNOSIS — Z13.6 SCREENING FOR CARDIOVASCULAR CONDITION: ICD-10-CM

## 2024-08-20 DIAGNOSIS — R73.03 PREDIABETES: ICD-10-CM

## 2024-08-20 RX ORDER — MELOXICAM 15 MG/1
15 TABLET ORAL DAILY
Qty: 90 TABLET | Refills: 1 | Status: SHIPPED | OUTPATIENT
Start: 2024-08-20 | End: 2025-01-10

## 2024-08-20 RX ORDER — MELOXICAM 15 MG/1
15 TABLET ORAL DAILY PRN
Qty: 10 TABLET | Refills: 0 | Status: SHIPPED | OUTPATIENT
Start: 2024-08-20

## 2024-08-20 ASSESSMENT — ENCOUNTER SYMPTOMS
DIARRHEA: 0
WHEEZING: 0
SHORTNESS OF BREATH: 0
GLOBUS SENSATION: 0
ABDOMINAL DISTENTION: 0
COLOR CHANGE: 0
HEARTBURN: 0
HOARSE VOICE: 0
ABDOMINAL PAIN: 0
COUGH: 0
CHEST TIGHTNESS: 0
BACK PAIN: 0
CHOKING: 0
VOMITING: 0
BELCHING: 0
CONSTIPATION: 0
NAUSEA: 0

## 2024-08-20 NOTE — TELEPHONE ENCOUNTER
Pt calling stating she's taking Meloxicam 15mg and would like it refilled, pt needs a 10 day supply to clinic and 90 day to [facility].

## 2024-08-20 NOTE — TELEPHONE ENCOUNTER
Patient aware [employer] covered.Needs to be seen in 3 months and call in monthly weights.Verbalized understanding

## 2024-08-20 NOTE — TELEPHONE ENCOUNTER
Med verified in [facility]  Spoke with patient and she is taking the Mobic for foot arthritis. Patient has not needed to go back to the foot doctor since post op so she is asking if PCP can continue.     Billie called requesting a refill of the below medication which has been pended for you:     Requested Prescriptions     Pending Prescriptions Disp Refills    meloxicam (MOBIC) 15 MG tablet 90 tablet 1     Sig: Take 1 tablet by mouth daily    meloxicam (MOBIC) 15 MG tablet 10 tablet 0     Sig: Take 1 tablet by mouth daily as needed for Pain       Last Appointment Date: 8/19/2024  Next Appointment Date: 2/20/2025    Allergies   Allergen Reactions    Tape [Adhesive Tape] Rash

## 2024-08-26 ENCOUNTER — TELEPHONE (OUTPATIENT)
Dept: FAMILY MEDICINE CLINIC | Age: 64
End: 2024-08-26

## 2024-08-26 RX ORDER — ONDANSETRON 4 MG/1
4 TABLET, ORALLY DISINTEGRATING ORAL 3 TIMES DAILY PRN
Qty: 21 TABLET | Refills: 0 | Status: SHIPPED | OUTPATIENT
Start: 2024-08-26

## 2024-08-26 NOTE — TELEPHONE ENCOUNTER
Pt calling stating she was put on and injectable and it's making her nauseous and pt would like something called in for the nausea to Clinic pharmacy, please advise.

## 2024-09-04 RX ORDER — MELOXICAM 15 MG/1
15 TABLET ORAL DAILY
Qty: 10 TABLET | Refills: 0 | Status: SHIPPED | OUTPATIENT
Start: 2024-09-04 | End: 2025-01-25

## 2024-09-04 NOTE — TELEPHONE ENCOUNTER
Spoke with patient and she is completely out of Mobic and would like a 10 day supply sent to Clinic pharmacy today. Patient verified she does use [employer] mailservice.

## 2024-09-04 NOTE — TELEPHONE ENCOUNTER
Spoke with [facility] mailservice and they are unable to locate patient in there system.     Left message on VM for patient to return call and verify insurance member ID and verify what pharmacy she is using.

## 2024-09-04 NOTE — TELEPHONE ENCOUNTER
Dispense reports shows med was sent 08/28/2024 for #90 patient informed still needs filled today since she is out.

## 2024-09-14 DIAGNOSIS — E66.09 CLASS 1 OBESITY DUE TO EXCESS CALORIES WITH SERIOUS COMORBIDITY AND BODY MASS INDEX (BMI) OF 34.0 TO 34.9 IN ADULT: ICD-10-CM

## 2024-09-16 RX ORDER — TIRZEPATIDE 5 MG/.5ML
5 INJECTION, SOLUTION SUBCUTANEOUS WEEKLY
Qty: 2 ML | Refills: 0 | Status: SHIPPED | OUTPATIENT
Start: 2024-09-16

## 2024-10-25 ENCOUNTER — HOSPITAL ENCOUNTER (OUTPATIENT)
Dept: GENERAL RADIOLOGY | Age: 64
Discharge: HOME | End: 2024-10-27
Payer: COMMERCIAL

## 2024-10-25 ENCOUNTER — OFFICE VISIT (OUTPATIENT)
Dept: FAMILY MEDICINE CLINIC | Age: 64
End: 2024-10-25
Payer: COMMERCIAL

## 2024-10-25 VITALS
HEART RATE: 84 BPM | DIASTOLIC BLOOD PRESSURE: 72 MMHG | SYSTOLIC BLOOD PRESSURE: 108 MMHG | RESPIRATION RATE: 18 BRPM | WEIGHT: 187 LBS | OXYGEN SATURATION: 98 % | HEIGHT: 64 IN | BODY MASS INDEX: 31.92 KG/M2

## 2024-10-25 DIAGNOSIS — S89.92XA LEFT KNEE INJURY, INITIAL ENCOUNTER: Primary | ICD-10-CM

## 2024-10-25 DIAGNOSIS — L30.9 DERMATITIS: ICD-10-CM

## 2024-10-25 DIAGNOSIS — S89.92XA LEFT KNEE INJURY, INITIAL ENCOUNTER: ICD-10-CM

## 2024-10-25 PROCEDURE — 99213 OFFICE O/P EST LOW 20 MIN: CPT | Performed by: NURSE PRACTITIONER

## 2024-10-25 PROCEDURE — 73562 X-RAY EXAM OF KNEE 3: CPT

## 2024-10-25 RX ORDER — PREDNISONE 20 MG/1
20 TABLET ORAL 2 TIMES DAILY
Qty: 10 TABLET | Refills: 0 | Status: SHIPPED | OUTPATIENT
Start: 2024-10-25 | End: 2024-10-30

## 2024-10-25 RX ORDER — TRAMADOL HYDROCHLORIDE 50 MG/1
50 TABLET ORAL EVERY 8 HOURS PRN
Qty: 15 TABLET | Refills: 0 | Status: SHIPPED | OUTPATIENT
Start: 2024-10-25 | End: 2024-10-30

## 2024-10-25 ASSESSMENT — PATIENT HEALTH QUESTIONNAIRE - PHQ9
SUM OF ALL RESPONSES TO PHQ QUESTIONS 1-9: 0
1. LITTLE INTEREST OR PLEASURE IN DOING THINGS: NOT AT ALL
2. FEELING DOWN, DEPRESSED OR HOPELESS: NOT AT ALL
SUM OF ALL RESPONSES TO PHQ QUESTIONS 1-9: 0
SUM OF ALL RESPONSES TO PHQ9 QUESTIONS 1 & 2: 0

## 2024-10-25 NOTE — PROGRESS NOTES
Subjective:      Patient ID: Billie Kendrick is a 64 y.o. female coming in for   Chief Complaint   Patient presents with    Knee Pain     Left knee pain. Started last Thursday when she fell taking her dog out. 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 Pain with standing and walking. She has been using biofreeze.    Rash     Rash on various parts of her body. Itching. Using OTC creams (benadryl and hydrocortisone)        HPI  Left knee pain since last Thursday. Pt was tripped by her dog with the lease and fell going up the steps. She fell forward landing on bilateral anterior knees. Pt denies any other injuries. Has been icing area.   Has scheduled appt with ortho in [place] on 10/29/24.     Rash to various areas. Has tried eliminating fragrances. Cannot pin point trigger.     Review of Systems     Objective:   Physical Exam  Vitals and nursing note reviewed.   Constitutional:       General: She is not in acute distress.     Appearance: Normal appearance. She is not ill-appearing.   Pulmonary:      Effort: Pulmonary effort is normal.      Breath sounds: No wheezing.   Musculoskeletal:      Cervical back: Neck supple.      Left knee: No swelling. Tenderness present over the medial joint line and lateral joint line. No MCL, LCL, ACL, PCL or patellar tendon tenderness.   Skin:     General: Skin is warm.      Findings: Rash (erythematous rash to neckline and wrist region) present.   Neurological:      General: No focal deficit present.      Mental Status: She is alert and oriented to person, place, and time.          Assessment:      1. Left knee injury, initial encounter    2. Dermatitis           Plan:    -xray ordered to start work up for left knee pain  -prednisone for inflammation of knee and rash  -keep ortho appt    Orders Placed This Encounter   Procedures    XR KNEE LEFT (3 VIEWS)     Standing Status:   Future     Number of Occurrences:   1     Standing Expiration Date:   10/25/2025     Order Specific

## 2024-10-26 ENCOUNTER — HOSPITAL ENCOUNTER (OUTPATIENT)
Age: 64
Discharge: HOME | End: 2024-10-26

## 2024-10-29 DIAGNOSIS — E66.09 CLASS 1 OBESITY DUE TO EXCESS CALORIES WITH SERIOUS COMORBIDITY AND BODY MASS INDEX (BMI) OF 32.0 TO 32.9 IN ADULT: Primary | ICD-10-CM

## 2024-10-29 DIAGNOSIS — E66.811 CLASS 1 OBESITY DUE TO EXCESS CALORIES WITH SERIOUS COMORBIDITY AND BODY MASS INDEX (BMI) OF 32.0 TO 32.9 IN ADULT: Primary | ICD-10-CM

## 2024-10-29 RX ORDER — TIRZEPATIDE 5 MG/.5ML
5 INJECTION, SOLUTION SUBCUTANEOUS WEEKLY
Qty: 2 ML | Refills: 0 | Status: CANCELLED | OUTPATIENT
Start: 2024-10-29

## 2024-10-29 RX ORDER — TIRZEPATIDE 7.5 MG/.5ML
7.5 INJECTION, SOLUTION SUBCUTANEOUS WEEKLY
Qty: 2 ML | Refills: 0 | Status: SHIPPED | OUTPATIENT
Start: 2024-10-29

## 2024-11-18 ENCOUNTER — OFFICE VISIT (OUTPATIENT)
Dept: FAMILY MEDICINE CLINIC | Age: 64
End: 2024-11-18

## 2024-11-18 VITALS
SYSTOLIC BLOOD PRESSURE: 118 MMHG | DIASTOLIC BLOOD PRESSURE: 68 MMHG | WEIGHT: 194.6 LBS | HEIGHT: 64 IN | BODY MASS INDEX: 33.22 KG/M2 | HEART RATE: 90 BPM | OXYGEN SATURATION: 96 %

## 2024-11-18 DIAGNOSIS — F41.9 ANXIETY: Primary | ICD-10-CM

## 2024-11-18 DIAGNOSIS — E66.09 CLASS 1 OBESITY DUE TO EXCESS CALORIES WITH SERIOUS COMORBIDITY AND BODY MASS INDEX (BMI) OF 33.0 TO 33.9 IN ADULT: ICD-10-CM

## 2024-11-18 DIAGNOSIS — E66.811 CLASS 1 OBESITY DUE TO EXCESS CALORIES WITH SERIOUS COMORBIDITY AND BODY MASS INDEX (BMI) OF 33.0 TO 33.9 IN ADULT: ICD-10-CM

## 2024-11-18 DIAGNOSIS — Z12.31 SCREENING MAMMOGRAM FOR BREAST CANCER: ICD-10-CM

## 2024-11-18 DIAGNOSIS — I69.954 HEMIPLEGIA OF LEFT NONDOMINANT SIDE AS LATE EFFECT OF CEREBROVASCULAR DISEASE, UNSPECIFIED CEREBROVASCULAR DISEASE TYPE, UNSPECIFIED HEMIPLEGIA TYPE (HCC): ICD-10-CM

## 2024-11-18 DIAGNOSIS — Z86.73 HISTORY OF CVA (CEREBROVASCULAR ACCIDENT): ICD-10-CM

## 2024-11-18 RX ORDER — IBUPROFEN 200 MG
200 TABLET ORAL 2 TIMES DAILY
COMMUNITY

## 2024-11-18 RX ORDER — ESCITALOPRAM OXALATE 10 MG/1
10 TABLET ORAL DAILY
Qty: 90 TABLET | Refills: 1 | Status: SHIPPED | OUTPATIENT
Start: 2024-11-18

## 2024-11-18 RX ORDER — TIRZEPATIDE 10 MG/.5ML
10 INJECTION, SOLUTION SUBCUTANEOUS WEEKLY
Qty: 2 ML | Refills: 0 | Status: SHIPPED | OUTPATIENT
Start: 2024-11-18

## 2024-11-18 RX ORDER — TIRZEPATIDE 7.5 MG/.5ML
7.5 INJECTION, SOLUTION SUBCUTANEOUS WEEKLY
Qty: 2 ML | Refills: 0 | Status: CANCELLED | OUTPATIENT
Start: 2024-11-18

## 2024-11-18 NOTE — PROGRESS NOTES
[facility] A DEPARTMENT OF [facility]  [address]  [facility] [address]  Dept: [phone]  Dept Fax: [phone]  Loc: [phone]    Billie Kendrick is a 64 y.o. female who presents today for her medical conditions/complaintsas noted below.  Billie Kendrick is c/o of   Chief Complaint   Patient presents with    Medication Refill     HPI:     Patient presents to the office for a recheck. Overall is doing well. Denies any specific issues or concerns at this time. Obesity - recently started on zepbound to help with weight loss. Tolerating medication well. Denies side effects or issues with medication. Denies medication causing nausea, vomiting, constipation, abdominal pain. Feels medication is helping. Is not exercising. Tries to watch her diet.   History of a CVA - continues to use plavix. Takes daily. Tolerating medication well. Denies any current symptoms today. Does still have some left side weakness.   Anxiety - takes lexapro. Tolerating medication well. Denies side effects or issues with medication. Feels that medication adequately helps. Denies any current symptoms        Hemoglobin A1C (%)   Date Value   08/05/2024 5.6   04/15/2023 5.7   03/30/2022 5.6             ( goal A1Cis < 7)   No components found for: \"LABMICR\"  No components found for: \"LDLCHOLESTEROL\", \"LDLCALC\"    (goal LDL is <100)   AST (U/L)   Date Value   08/05/2024 15     ALT (U/L)   Date Value   08/05/2024 17     BUN (mg/dL)   Date Value   08/05/2024 22     BP Readings from Last 3 Encounters:   11/18/24 118/68   10/25/24 108/72   08/19/24 124/76          (goal 120/80)    Past Medical History:   Diagnosis Date    Anxiety     Cerebrovascular disease     Mini stroke in 2006    CVA (cerebral infarction)     CVA (cerebral vascular accident) (HCC) 07/14/2014    Facial weakness     left    Family history of colon cancer     Gastroesophageal reflux disease without esophagitis

## 2024-11-24 DIAGNOSIS — K21.9 GASTROESOPHAGEAL REFLUX DISEASE WITHOUT ESOPHAGITIS: ICD-10-CM

## 2024-11-25 RX ORDER — MELOXICAM 15 MG/1
15 TABLET ORAL DAILY PRN
Qty: 90 TABLET | Refills: 1 | Status: SHIPPED | OUTPATIENT
Start: 2024-11-25

## 2024-11-25 RX ORDER — FAMOTIDINE 40 MG/1
40 TABLET, FILM COATED ORAL DAILY
Qty: 90 TABLET | Refills: 1 | Status: SHIPPED | OUTPATIENT
Start: 2024-11-25

## 2024-11-27 ENCOUNTER — HOSPITAL ENCOUNTER (OUTPATIENT)
Dept: MAMMOGRAPHY | Age: 64
Discharge: HOME | End: 2024-11-29
Payer: COMMERCIAL

## 2024-11-27 VITALS — WEIGHT: 188 LBS | HEIGHT: 64 IN | BODY MASS INDEX: 32.1 KG/M2

## 2024-11-27 DIAGNOSIS — Z12.31 SCREENING MAMMOGRAM FOR BREAST CANCER: ICD-10-CM

## 2024-11-27 PROCEDURE — 77063 BREAST TOMOSYNTHESIS BI: CPT

## 2024-11-28 PROBLEM — L03.213 PRESEPTAL CELLULITIS OF RIGHT UPPER EYELID: Status: RESOLVED | Noted: 2019-01-31 | Resolved: 2024-11-28

## 2024-11-28 PROBLEM — Z86.73 HISTORY OF CVA (CEREBROVASCULAR ACCIDENT): Status: ACTIVE | Noted: 2024-11-28

## 2024-11-28 PROBLEM — H00.011 HORDEOLUM EXTERNUM OF RIGHT UPPER EYELID: Status: RESOLVED | Noted: 2019-01-31 | Resolved: 2024-11-28

## 2024-11-28 ASSESSMENT — ENCOUNTER SYMPTOMS
ABDOMINAL PAIN: 0
ABDOMINAL DISTENTION: 0
WHEEZING: 0
CONSTIPATION: 0
VOMITING: 0
DIARRHEA: 0
CHEST TIGHTNESS: 0
SHORTNESS OF BREATH: 0
NAUSEA: 0
COLOR CHANGE: 0
COUGH: 0

## 2024-12-15 DIAGNOSIS — E66.09 CLASS 1 OBESITY DUE TO EXCESS CALORIES WITH SERIOUS COMORBIDITY AND BODY MASS INDEX (BMI) OF 33.0 TO 33.9 IN ADULT: ICD-10-CM

## 2024-12-15 DIAGNOSIS — E66.811 CLASS 1 OBESITY DUE TO EXCESS CALORIES WITH SERIOUS COMORBIDITY AND BODY MASS INDEX (BMI) OF 33.0 TO 33.9 IN ADULT: ICD-10-CM

## 2024-12-17 RX ORDER — TIRZEPATIDE 10 MG/.5ML
10 INJECTION, SOLUTION SUBCUTANEOUS WEEKLY
Qty: 2 ML | Refills: 0 | Status: SHIPPED | OUTPATIENT
Start: 2024-12-17

## 2024-12-29 RX ORDER — RABEPRAZOLE SODIUM 20 MG/1
20 TABLET, DELAYED RELEASE ORAL DAILY
Qty: 90 TABLET | Refills: 1 | Status: CANCELLED | OUTPATIENT
Start: 2024-12-29

## 2024-12-30 RX ORDER — RABEPRAZOLE SODIUM 20 MG/1
20 TABLET, DELAYED RELEASE ORAL DAILY
Qty: 90 TABLET | Refills: 1 | Status: SHIPPED | OUTPATIENT
Start: 2024-12-30

## 2025-01-07 DIAGNOSIS — N32.81 OVERACTIVE BLADDER: ICD-10-CM

## 2025-01-07 RX ORDER — SOLIFENACIN SUCCINATE 10 MG/1
10 TABLET, FILM COATED ORAL DAILY
Qty: 90 TABLET | Refills: 1 | Status: SHIPPED | OUTPATIENT
Start: 2025-01-07

## 2025-01-08 DIAGNOSIS — E66.811 CLASS 1 OBESITY DUE TO EXCESS CALORIES WITH SERIOUS COMORBIDITY AND BODY MASS INDEX (BMI) OF 33.0 TO 33.9 IN ADULT: ICD-10-CM

## 2025-01-08 DIAGNOSIS — E66.09 CLASS 1 OBESITY DUE TO EXCESS CALORIES WITH SERIOUS COMORBIDITY AND BODY MASS INDEX (BMI) OF 33.0 TO 33.9 IN ADULT: ICD-10-CM

## 2025-01-09 RX ORDER — TIRZEPATIDE 10 MG/.5ML
10 INJECTION, SOLUTION SUBCUTANEOUS WEEKLY
Qty: 2 ML | Refills: 0 | Status: SHIPPED | OUTPATIENT
Start: 2025-01-09

## 2025-02-06 DIAGNOSIS — E66.09 CLASS 1 OBESITY DUE TO EXCESS CALORIES WITH SERIOUS COMORBIDITY AND BODY MASS INDEX (BMI) OF 33.0 TO 33.9 IN ADULT: ICD-10-CM

## 2025-02-06 DIAGNOSIS — E66.811 CLASS 1 OBESITY DUE TO EXCESS CALORIES WITH SERIOUS COMORBIDITY AND BODY MASS INDEX (BMI) OF 33.0 TO 33.9 IN ADULT: ICD-10-CM

## 2025-02-06 RX ORDER — TIRZEPATIDE 10 MG/.5ML
10 INJECTION, SOLUTION SUBCUTANEOUS WEEKLY
Qty: 2 ML | Refills: 0 | Status: SHIPPED | OUTPATIENT
Start: 2025-02-06

## 2025-02-28 DIAGNOSIS — E66.811 CLASS 1 OBESITY DUE TO EXCESS CALORIES WITH SERIOUS COMORBIDITY AND BODY MASS INDEX (BMI) OF 33.0 TO 33.9 IN ADULT: ICD-10-CM

## 2025-02-28 DIAGNOSIS — E66.09 CLASS 1 OBESITY DUE TO EXCESS CALORIES WITH SERIOUS COMORBIDITY AND BODY MASS INDEX (BMI) OF 33.0 TO 33.9 IN ADULT: ICD-10-CM

## 2025-02-28 RX ORDER — TIRZEPATIDE 10 MG/.5ML
10 INJECTION, SOLUTION SUBCUTANEOUS WEEKLY
Qty: 2 ML | Refills: 0 | OUTPATIENT
Start: 2025-02-28

## 2025-02-28 NOTE — TELEPHONE ENCOUNTER
MA pt, LSS on notes. Pt requesting dose increase due to weight loss stopped      Billie called requesting a refill of the below medication which has been pended for you:     Requested Prescriptions     Pending Prescriptions Disp Refills    tirzepatide-weight management (ZEPBOUND) 12.5 MG/0.5ML SOAJ subCUTAneous auto-injector pen 2 mL 0     Sig: Inject 12.5 mg into the skin every 7 days     Refused Prescriptions Disp Refills    tirzepatide-weight management (ZEPBOUND) 10 MG/0.5ML SOAJ subCUTAneous auto-injector pen 2 mL 0     Sig: Inject 10 mg into the skin once a week       Last Appointment Date: 6/30/2024  Next Appointment Date: Visit date not found    Allergies   Allergen Reactions    Tape [Adhesive Tape] Rash

## 2025-03-16 DIAGNOSIS — Z86.73 HISTORY OF CVA (CEREBROVASCULAR ACCIDENT): ICD-10-CM

## 2025-03-17 RX ORDER — CLOPIDOGREL BISULFATE 75 MG/1
75 TABLET ORAL DAILY
Qty: 90 TABLET | Refills: 0 | Status: SHIPPED | OUTPATIENT
Start: 2025-03-17

## 2025-04-06 DIAGNOSIS — N32.81 OVERACTIVE BLADDER: ICD-10-CM

## 2025-04-07 RX ORDER — SOLIFENACIN SUCCINATE 10 MG/1
10 TABLET, FILM COATED ORAL DAILY
Qty: 90 TABLET | Refills: 0 | Status: SHIPPED | OUTPATIENT
Start: 2025-04-07

## 2025-04-29 ENCOUNTER — TELEPHONE (OUTPATIENT)
Dept: FAMILY MEDICINE CLINIC | Age: 65
End: 2025-04-29

## 2025-05-24 ENCOUNTER — HOSPITAL ENCOUNTER (OUTPATIENT)
Age: 65
Discharge: HOME | End: 2025-05-24
Payer: COMMERCIAL

## 2025-05-24 DIAGNOSIS — R63.5 WEIGHT GAIN: ICD-10-CM

## 2025-05-24 LAB
CORTIS SERPL-MCNC: 17 UG/DL (ref 2.5–19.5)
TSH SERPL DL<=0.05 MIU/L-ACNC: 0.55 UIU/ML (ref 0.27–4.2)

## 2025-05-24 PROCEDURE — 84443 ASSAY THYROID STIM HORMONE: CPT

## 2025-05-24 PROCEDURE — 82533 TOTAL CORTISOL: CPT

## 2025-05-24 PROCEDURE — 36415 COLL VENOUS BLD VENIPUNCTURE: CPT

## 2025-05-27 ENCOUNTER — RESULTS FOLLOW-UP (OUTPATIENT)
Dept: FAMILY MEDICINE CLINIC | Age: 65
End: 2025-05-27

## 2025-05-28 ENCOUNTER — HOSPITAL ENCOUNTER (OUTPATIENT)
Dept: GENERAL RADIOLOGY | Age: 65
Discharge: HOME | End: 2025-05-30
Payer: COMMERCIAL

## 2025-05-28 ENCOUNTER — OFFICE VISIT (OUTPATIENT)
Dept: FAMILY MEDICINE CLINIC | Age: 65
End: 2025-05-28
Payer: COMMERCIAL

## 2025-05-28 ENCOUNTER — RESULTS FOLLOW-UP (OUTPATIENT)
Dept: FAMILY MEDICINE CLINIC | Age: 65
End: 2025-05-28

## 2025-05-28 ENCOUNTER — HOSPITAL ENCOUNTER (OUTPATIENT)
Age: 65
Discharge: HOME | End: 2025-05-28
Payer: COMMERCIAL

## 2025-05-28 VITALS
DIASTOLIC BLOOD PRESSURE: 70 MMHG | HEART RATE: 82 BPM | BODY MASS INDEX: 32.27 KG/M2 | SYSTOLIC BLOOD PRESSURE: 120 MMHG | OXYGEN SATURATION: 97 % | TEMPERATURE: 98.1 F | WEIGHT: 189 LBS | HEIGHT: 64 IN | RESPIRATION RATE: 16 BRPM

## 2025-05-28 DIAGNOSIS — R07.89 CHEST PRESSURE: Primary | ICD-10-CM

## 2025-05-28 DIAGNOSIS — R25.2 LEG CRAMPS: ICD-10-CM

## 2025-05-28 DIAGNOSIS — Z82.49 FAMILY HISTORY OF CORONARY ARTERY DISEASE: ICD-10-CM

## 2025-05-28 DIAGNOSIS — R07.89 CHEST PRESSURE: ICD-10-CM

## 2025-05-28 LAB
ALBUMIN SERPL-MCNC: 4.5 G/DL (ref 3.5–5.2)
ALBUMIN/GLOB SERPL: 2 {RATIO} (ref 1–2.5)
ALP SERPL-CCNC: 72 U/L (ref 35–104)
ALT SERPL-CCNC: 19 U/L (ref 10–35)
ANION GAP SERPL CALCULATED.3IONS-SCNC: 10 MMOL/L (ref 9–16)
AST SERPL-CCNC: 17 U/L (ref 10–35)
BASOPHILS # BLD: 0.06 K/UL (ref 0–0.2)
BASOPHILS NFR BLD: 1 % (ref 0–2)
BILIRUB SERPL-MCNC: <0.2 MG/DL (ref 0–1.2)
BUN SERPL-MCNC: 23 MG/DL (ref 8–23)
BUN/CREAT SERPL: 26 (ref 9–20)
CALCIUM SERPL-MCNC: 9.5 MG/DL (ref 8.6–10.4)
CHLORIDE SERPL-SCNC: 103 MMOL/L (ref 98–107)
CO2 SERPL-SCNC: 26 MMOL/L (ref 20–31)
CREAT SERPL-MCNC: 0.9 MG/DL (ref 0.6–0.9)
EOSINOPHIL # BLD: 0.11 K/UL (ref 0–0.44)
EOSINOPHILS RELATIVE PERCENT: 1 % (ref 1–4)
ERYTHROCYTE [DISTWIDTH] IN BLOOD BY AUTOMATED COUNT: 14.1 % (ref 11.8–14.4)
GFR, ESTIMATED: 72 ML/MIN/1.73M2
GLUCOSE SERPL-MCNC: 96 MG/DL (ref 74–99)
HCT VFR BLD AUTO: 40.1 % (ref 36.3–47.1)
HGB BLD-MCNC: 13.2 G/DL (ref 11.9–15.1)
IMM GRANULOCYTES # BLD AUTO: 0.03 K/UL (ref 0–0.3)
IMM GRANULOCYTES NFR BLD: 0 %
LYMPHOCYTES NFR BLD: 2.44 K/UL (ref 1.1–3.7)
LYMPHOCYTES RELATIVE PERCENT: 31 % (ref 24–43)
MCH RBC QN AUTO: 29.4 PG (ref 25.2–33.5)
MCHC RBC AUTO-ENTMCNC: 32.9 G/DL (ref 25.2–33.5)
MCV RBC AUTO: 89.3 FL (ref 82.6–102.9)
MONOCYTES NFR BLD: 0.53 K/UL (ref 0.1–1.2)
MONOCYTES NFR BLD: 7 % (ref 3–12)
NEUTROPHILS NFR BLD: 60 % (ref 36–65)
NEUTS SEG NFR BLD: 4.68 K/UL (ref 1.5–8.1)
NRBC BLD-RTO: 0 PER 100 WBC
PLATELET # BLD AUTO: 308 K/UL (ref 138–453)
PMV BLD AUTO: 8.6 FL (ref 8.1–13.5)
POTASSIUM SERPL-SCNC: 4.4 MMOL/L (ref 3.7–5.3)
PROT SERPL-MCNC: 6.8 G/DL (ref 6.6–8.7)
RBC # BLD AUTO: 4.49 M/UL (ref 3.95–5.11)
SODIUM SERPL-SCNC: 139 MMOL/L (ref 136–145)
WBC OTHER # BLD: 7.9 K/UL (ref 3.5–11.3)

## 2025-05-28 PROCEDURE — 71046 X-RAY EXAM CHEST 2 VIEWS: CPT

## 2025-05-28 PROCEDURE — 36415 COLL VENOUS BLD VENIPUNCTURE: CPT

## 2025-05-28 PROCEDURE — 99204 OFFICE O/P NEW MOD 45 MIN: CPT | Performed by: FAMILY MEDICINE

## 2025-05-28 PROCEDURE — 80053 COMPREHEN METABOLIC PANEL: CPT

## 2025-05-28 PROCEDURE — 85025 COMPLETE CBC W/AUTO DIFF WBC: CPT

## 2025-05-28 PROCEDURE — 93000 ELECTROCARDIOGRAM COMPLETE: CPT | Performed by: FAMILY MEDICINE

## 2025-05-28 SDOH — ECONOMIC STABILITY: FOOD INSECURITY: WITHIN THE PAST 12 MONTHS, THE FOOD YOU BOUGHT JUST DIDN'T LAST AND YOU DIDN'T HAVE MONEY TO GET MORE.: PATIENT DECLINED

## 2025-05-28 SDOH — ECONOMIC STABILITY: FOOD INSECURITY: WITHIN THE PAST 12 MONTHS, YOU WORRIED THAT YOUR FOOD WOULD RUN OUT BEFORE YOU GOT MONEY TO BUY MORE.: PATIENT DECLINED

## 2025-05-28 ASSESSMENT — PATIENT HEALTH QUESTIONNAIRE - PHQ9
SUM OF ALL RESPONSES TO PHQ QUESTIONS 1-9: 2
SUM OF ALL RESPONSES TO PHQ QUESTIONS 1-9: 2
2. FEELING DOWN, DEPRESSED OR HOPELESS: SEVERAL DAYS
1. LITTLE INTEREST OR PLEASURE IN DOING THINGS: SEVERAL DAYS
SUM OF ALL RESPONSES TO PHQ QUESTIONS 1-9: 2
SUM OF ALL RESPONSES TO PHQ QUESTIONS 1-9: 2

## 2025-05-28 NOTE — PROGRESS NOTES
[place] [address]                        Telephone [phone]             Fax [phone]       Billie Kendrick  :  1960  Age:  64 y.o.   MRN:  [id number]  Date of visit:  2025       Assessment and Plan:    1. Chest pressure  2. Family history of coronary artery disease   3. Leg cramps  I reviewed the results of the labs done 2025 with the patient, as well as the ECG done today.  Additional labs were ordered to be done today:  - Comprehensive Metabolic Panel; Future  - CBC with Auto Differential; Future    - XR CHEST STANDARD (2 VW); Future was also ordered to be done today.  She will be contacted when the results are available.    She was also referred to cardiology.  - Ambulatory referral to Cardiology    She was advised that if she experiences another episode of discomfort that does not resolve immediately, she is advised to seek immediate medical attention at the emergency room.      Follow up instructions were given to the patient:  Return if symptoms worsen or fail to improve.             Subjective:    Billie Kendrick is a 64 y.o. female who presents to [facility] today (2025) for evaluation of:  Chest Pain (Chest pressure- started 2 weeks ago. No other symptoms. No pain) and night sweats (Started last week)         History of Present Illness  The patient presents for evaluation of chest pressure, night sweats, and leg cramps.    She reports experiencing intermittent chest discomfort, described as sharp pain that subsides as quickly as it starts. An episode of chest pressure occurred while she was resting after work, lasting approximately 2 to 3 minutes before resolving. She does not experience any associated respiratory distress but occasionally holds her breath due to the pain. Her occupation involves physical labor, but she does not report any

## 2025-06-03 DIAGNOSIS — K21.9 GASTROESOPHAGEAL REFLUX DISEASE WITHOUT ESOPHAGITIS: ICD-10-CM

## 2025-06-04 NOTE — TELEPHONE ENCOUNTER
Billie called requesting a refill of the below medication which has been pended for you:     Requested Prescriptions     Pending Prescriptions Disp Refills    famotidine (PEPCID) 40 MG tablet [Pharmacy Med Name: FAMOTIDINE TAB 40MG] 90 tablet 1     Sig: TAKE 1 TABLET DAILY       Last Appointment Date: 11/18/2024  Next Appointment Date: 8/12/2025    Allergies   Allergen Reactions    Tape [Adhesive Tape] Rash

## 2025-06-04 NOTE — TELEPHONE ENCOUNTER
Billie called requesting a refill of the below medication which has been pended for you:     Requested Prescriptions     Pending Prescriptions Disp Refills    meloxicam (MOBIC) 15 MG tablet 90 tablet 1     Sig: Take 1 tablet by mouth daily as needed for Pain       Last Appointment Date: 11/18/2024  Next Appointment Date: 6/3/2025    Allergies   Allergen Reactions    Tape [Adhesive Tape] Rash

## 2025-06-05 RX ORDER — FAMOTIDINE 40 MG/1
40 TABLET, FILM COATED ORAL DAILY
Qty: 90 TABLET | Refills: 1 | Status: SHIPPED | OUTPATIENT
Start: 2025-06-05

## 2025-06-05 RX ORDER — MELOXICAM 15 MG/1
15 TABLET ORAL DAILY PRN
Qty: 90 TABLET | Refills: 1 | Status: SHIPPED | OUTPATIENT
Start: 2025-06-05

## 2025-06-13 ENCOUNTER — OFFICE VISIT (OUTPATIENT)
Dept: CARDIOLOGY | Age: 65
End: 2025-06-13
Payer: COMMERCIAL

## 2025-06-13 VITALS
OXYGEN SATURATION: 98 % | DIASTOLIC BLOOD PRESSURE: 68 MMHG | SYSTOLIC BLOOD PRESSURE: 106 MMHG | HEIGHT: 64 IN | BODY MASS INDEX: 30.97 KG/M2 | HEART RATE: 80 BPM | WEIGHT: 181.4 LBS

## 2025-06-13 DIAGNOSIS — Z86.73 HX-TIA (TRANSIENT ISCHEMIC ATTACK): ICD-10-CM

## 2025-06-13 DIAGNOSIS — I10 PRIMARY HYPERTENSION: Primary | ICD-10-CM

## 2025-06-13 DIAGNOSIS — R07.9 CHEST PAIN, UNSPECIFIED TYPE: ICD-10-CM

## 2025-06-13 DIAGNOSIS — I20.9 ANGINA PECTORIS: ICD-10-CM

## 2025-06-13 PROCEDURE — 3074F SYST BP LT 130 MM HG: CPT | Performed by: INTERNAL MEDICINE

## 2025-06-13 PROCEDURE — 3078F DIAST BP <80 MM HG: CPT | Performed by: INTERNAL MEDICINE

## 2025-06-13 PROCEDURE — 99204 OFFICE O/P NEW MOD 45 MIN: CPT | Performed by: INTERNAL MEDICINE

## 2025-06-13 RX ORDER — ISOSORBIDE MONONITRATE 30 MG/1
30 TABLET, EXTENDED RELEASE ORAL DAILY
Qty: 30 TABLET | Refills: 3 | Status: SHIPPED | OUTPATIENT
Start: 2025-06-13

## 2025-06-13 NOTE — PROGRESS NOTES
[facility]  Consultation/Follow Up.    Billie Kendrick  1960  [id number]    Today: 6/13/25    CC: Patient is here for   Chief Complaint   Patient presents with    Chest Pain     Tightness or pressure in chest at random times on right side of chest     Palpitations        HPI:   Billie Kendrick      64-year-old female with past medical history of tobacco abuse, transient ischemic attack, obstructive sleep apnea, patient has abnormal stress test in 2015 followed by heart catheterization, she does not remember but she said they told her the blockage is not enough to fix it with a stent, patient started having chest pain chest pressure last for few minutes on send of the chest to the right side happens while exertion even with resting.  No association of shortness of breath, orthopnea, PND, lower leg edema.  Patient has a right foot surgery before.  Patient does not smoke cigarettes currently, she stopped smoking cigarettes a year ago, patient smokes cigarettes for 15 to 20 years, 1 pack/day.    Past Medical:  Past Medical History:   Diagnosis Date    Anxiety     Cerebrovascular disease     Mini stroke in 2006    CVA (cerebral infarction)     CVA (cerebral vascular accident) (HCC) 07/14/2014    Facial weakness     left    Family history of colon cancer     Gastroesophageal reflux disease without esophagitis 05/31/2016    History of TIAs     Hypoglycemia     Left hemiparesis (HCC)     Memory problem     Migraine     Sleep difficulties     Speech abnormality     Tobacco abuse     Unspecified sleep apnea          Past Surgical:  Past Surgical History:   Procedure Laterality Date    APPENDECTOMY      CARDIAC CATHETERIZATION  7-2-15    nml    CHOLECYSTECTOMY      COLONOSCOPY  4/29/2015    2 polyps, sigmoid diverticulosis    FOOT SURGERY Right     HYSTERECTOMY (CERVIX STATUS UNKNOWN)      OVARIAN CYST SURGERY      TONSILLECTOMY           Family History:  Family History   Problem Relation Age of

## 2025-07-03 DIAGNOSIS — Z86.73 HISTORY OF CVA (CEREBROVASCULAR ACCIDENT): ICD-10-CM

## 2025-07-03 RX ORDER — CLOPIDOGREL BISULFATE 75 MG/1
75 TABLET ORAL DAILY
Qty: 90 TABLET | Refills: 1 | Status: SHIPPED | OUTPATIENT
Start: 2025-07-03

## 2025-07-07 ENCOUNTER — HOSPITAL ENCOUNTER (OUTPATIENT)
Dept: NUCLEAR MEDICINE | Age: 65
Discharge: HOME | End: 2025-07-09
Attending: INTERNAL MEDICINE
Payer: COMMERCIAL

## 2025-07-07 ENCOUNTER — TELEPHONE (OUTPATIENT)
Dept: CARDIOLOGY | Age: 65
End: 2025-07-07

## 2025-07-07 ENCOUNTER — HOSPITAL ENCOUNTER (OUTPATIENT)
Age: 65
Discharge: HOME | End: 2025-07-09
Attending: INTERNAL MEDICINE
Payer: COMMERCIAL

## 2025-07-07 DIAGNOSIS — I20.9 ANGINA PECTORIS: ICD-10-CM

## 2025-07-07 DIAGNOSIS — R07.9 CHEST PAIN, UNSPECIFIED TYPE: ICD-10-CM

## 2025-07-07 LAB
NUC STRESS EJECTION FRACTION: 71 %
STRESS BASELINE DIAS BP: 75 MMHG
STRESS BASELINE HR: 60 BPM
STRESS BASELINE SYS BP: 136 MMHG
STRESS ESTIMATED WORKLOAD: 1 METS
STRESS PEAK DIAS BP: 71 MMHG
STRESS PEAK SYS BP: 142 MMHG
STRESS PERCENT HR ACHIEVED: 59 %
STRESS POST PEAK HR: 92 BPM
STRESS RATE PRESSURE PRODUCT: NORMAL BPM*MMHG
STRESS TARGET HR: 157 BPM
TID: 1.2

## 2025-07-07 PROCEDURE — 78452 HT MUSCLE IMAGE SPECT MULT: CPT

## 2025-07-07 PROCEDURE — 93017 CV STRESS TEST TRACING ONLY: CPT

## 2025-07-07 PROCEDURE — 3430000000 HC RX DIAGNOSTIC RADIOPHARMACEUTICAL: Performed by: INTERNAL MEDICINE

## 2025-07-07 PROCEDURE — 93018 CV STRESS TEST I&R ONLY: CPT | Performed by: INTERNAL MEDICINE

## 2025-07-07 PROCEDURE — 6360000002 HC RX W HCPCS: Performed by: INTERNAL MEDICINE

## 2025-07-07 PROCEDURE — A9500 TC99M SESTAMIBI: HCPCS | Performed by: INTERNAL MEDICINE

## 2025-07-07 RX ORDER — REGADENOSON 0.08 MG/ML
0.4 INJECTION, SOLUTION INTRAVENOUS ONCE
Status: COMPLETED | OUTPATIENT
Start: 2025-07-07 | End: 2025-07-07

## 2025-07-07 RX ORDER — AMINOPHYLLINE 25 MG/ML
100 INJECTION, SOLUTION INTRAVENOUS ONCE
Status: COMPLETED | OUTPATIENT
Start: 2025-07-07 | End: 2025-07-07

## 2025-07-07 RX ORDER — TETRAKIS(2-METHOXYISOBUTYLISOCYANIDE)COPPER(I) TETRAFLUOROBORATE 1 MG/ML
10 INJECTION, POWDER, LYOPHILIZED, FOR SOLUTION INTRAVENOUS
Status: COMPLETED | OUTPATIENT
Start: 2025-07-07 | End: 2025-07-07

## 2025-07-07 RX ORDER — TETRAKIS(2-METHOXYISOBUTYLISOCYANIDE)COPPER(I) TETRAFLUOROBORATE 1 MG/ML
30 INJECTION, POWDER, LYOPHILIZED, FOR SOLUTION INTRAVENOUS
Status: COMPLETED | OUTPATIENT
Start: 2025-07-07 | End: 2025-07-07

## 2025-07-07 RX ADMIN — Medication 30 MILLICURIE: at 10:57

## 2025-07-07 RX ADMIN — AMINOPHYLLINE 100 MG: 25 INJECTION, SOLUTION INTRAVENOUS at 11:03

## 2025-07-07 RX ADMIN — REGADENOSON 0.4 MG: 0.08 INJECTION, SOLUTION INTRAVENOUS at 10:55

## 2025-07-07 RX ADMIN — Medication 10 MILLICURIE: at 09:35

## 2025-07-07 NOTE — TELEPHONE ENCOUNTER
Interpretation Summary  Show Result Comparison     Stress Combined Conclusion: The study is negative for myocardial ischemia and infarction. Findings suggest a low risk of cardiac events.    Stress Function: Left ventricular function post-stress is normal. Post-stress ejection fraction is 71%.    Perfusion Comments: Prone images were obtained. Prone imaging was helpful in correcting soft tissue attenuation. LV perfusion is normal. There is no evidence of inducible ischemia.    Perfusion Conclusion: TID ratio is 1.20.    ECG: The stress ECG was negative for ischemia.    ECG: Resting ECG demonstrates normal sinus rhythm.    Stress Test: A pharmacological stress test was performed using regadenoson (Lexiscan). 100 mg of aminophylline given as a reversal agent.

## 2025-07-12 DIAGNOSIS — F41.9 ANXIETY: ICD-10-CM

## 2025-07-14 RX ORDER — ESCITALOPRAM OXALATE 10 MG/1
10 TABLET ORAL DAILY
Qty: 90 TABLET | Refills: 1 | Status: SHIPPED | OUTPATIENT
Start: 2025-07-14

## 2025-07-14 RX ORDER — RABEPRAZOLE SODIUM 20 MG/1
20 TABLET, DELAYED RELEASE ORAL DAILY
Qty: 90 TABLET | Refills: 1 | Status: SHIPPED | OUTPATIENT
Start: 2025-07-14

## 2025-07-14 NOTE — TELEPHONE ENCOUNTER
Billie called requesting a refill of the below medication which has been pended for you:     Requested Prescriptions     Pending Prescriptions Disp Refills    RABEprazole (ACIPHEX) 20 MG tablet 90 tablet 1     Sig: Take 1 tablet by mouth daily       Last Appointment Date: 11/18/2024  Next Appointment Date: 7/12/2025    Allergies   Allergen Reactions    Tape [Adhesive Tape] Rash

## 2025-07-14 NOTE — TELEPHONE ENCOUNTER
Billie called requesting a refill of the below medication which has been pended for you:     Requested Prescriptions     Pending Prescriptions Disp Refills    escitalopram (LEXAPRO) 10 MG tablet 90 tablet 1     Sig: Take 1 tablet by mouth daily       Last Appointment Date: 11/18/2024  Next Appointment Date: 8/12/2025    Allergies   Allergen Reactions    Tape [Adhesive Tape] Rash

## 2025-07-20 DIAGNOSIS — N32.81 OVERACTIVE BLADDER: ICD-10-CM

## 2025-07-21 RX ORDER — SOLIFENACIN SUCCINATE 10 MG/1
10 TABLET, FILM COATED ORAL DAILY
Qty: 90 TABLET | Refills: 1 | Status: SHIPPED | OUTPATIENT
Start: 2025-07-21

## 2025-07-21 NOTE — TELEPHONE ENCOUNTER
Billie called requesting a refill of the below medication which has been pended for you:     Requested Prescriptions     Pending Prescriptions Disp Refills    solifenacin (VESICARE) 10 MG tablet 90 tablet 0     Sig: Take 1 tablet by mouth daily       Last Appointment Date: Visit date not found  Next Appointment Date: 7/20/2025    Allergies   Allergen Reactions    Tape [Adhesive Tape] Rash